# Patient Record
Sex: MALE | Race: WHITE | NOT HISPANIC OR LATINO | Employment: OTHER | URBAN - METROPOLITAN AREA
[De-identification: names, ages, dates, MRNs, and addresses within clinical notes are randomized per-mention and may not be internally consistent; named-entity substitution may affect disease eponyms.]

---

## 2023-07-19 ENCOUNTER — HOSPITAL ENCOUNTER (OUTPATIENT)
Dept: DATA CONVERSION | Facility: HOSPITAL | Age: 78
End: 2023-07-19
Attending: STUDENT IN AN ORGANIZED HEALTH CARE EDUCATION/TRAINING PROGRAM | Admitting: STUDENT IN AN ORGANIZED HEALTH CARE EDUCATION/TRAINING PROGRAM
Payer: MEDICARE

## 2023-07-19 DIAGNOSIS — C61 MALIGNANT NEOPLASM OF PROSTATE (MULTI): ICD-10-CM

## 2023-07-19 DIAGNOSIS — N52.9 MALE ERECTILE DYSFUNCTION, UNSPECIFIED: ICD-10-CM

## 2023-07-19 DIAGNOSIS — N32.9 BLADDER DISORDER, UNSPECIFIED: ICD-10-CM

## 2023-07-19 DIAGNOSIS — C67.9 MALIGNANT NEOPLASM OF BLADDER, UNSPECIFIED (MULTI): ICD-10-CM

## 2023-07-19 DIAGNOSIS — R31.29 OTHER MICROSCOPIC HEMATURIA: ICD-10-CM

## 2023-07-19 DIAGNOSIS — F17.210 NICOTINE DEPENDENCE, CIGARETTES, UNCOMPLICATED: ICD-10-CM

## 2023-07-19 DIAGNOSIS — N30.40 IRRADIATION CYSTITIS WITHOUT HEMATURIA: ICD-10-CM

## 2023-07-19 DIAGNOSIS — R33.9 RETENTION OF URINE, UNSPECIFIED: ICD-10-CM

## 2023-07-19 DIAGNOSIS — N32.89 OTHER SPECIFIED DISORDERS OF BLADDER: ICD-10-CM

## 2023-07-19 DIAGNOSIS — Z80.42 FAMILY HISTORY OF MALIGNANT NEOPLASM OF PROSTATE: ICD-10-CM

## 2023-07-19 DIAGNOSIS — N40.1 BENIGN PROSTATIC HYPERPLASIA WITH LOWER URINARY TRACT SYMPTOMS: ICD-10-CM

## 2023-07-28 LAB
COMPLETE PATHOLOGY REPORT: NORMAL
CONVERTED CLINICAL DIAGNOSIS-HISTORY: NORMAL
CONVERTED FINAL DIAGNOSIS: NORMAL
CONVERTED FINAL REPORT PDF LINK TO COPY AND PASTE: NORMAL
CONVERTED GROSS DESCRIPTION: NORMAL

## 2023-08-03 LAB — URINE CULTURE: NO GROWTH

## 2023-09-08 PROBLEM — K14.6 TONGUE PAIN: Status: ACTIVE | Noted: 2023-09-08

## 2023-09-08 PROBLEM — K11.5 SIALOLITHIASIS: Status: ACTIVE | Noted: 2023-09-08

## 2023-09-08 PROBLEM — Z72.0 TOBACCO ABUSE: Status: ACTIVE | Noted: 2023-09-08

## 2023-09-08 PROBLEM — N40.1 ENLARGED PROSTATE WITH LOWER URINARY TRACT SYMPTOMS (LUTS): Status: ACTIVE | Noted: 2023-09-08

## 2023-09-08 PROBLEM — K11.5 SALIVARY STONES: Status: ACTIVE | Noted: 2023-09-08

## 2023-09-08 PROBLEM — R33.9 INCOMPLETE EMPTYING OF BLADDER: Status: ACTIVE | Noted: 2023-09-08

## 2023-09-08 PROBLEM — N40.0 BENIGN PROSTATIC HYPERPLASIA: Status: ACTIVE | Noted: 2023-09-08

## 2023-09-08 PROBLEM — K14.6 GLOSSODYNIA: Status: ACTIVE | Noted: 2023-09-08

## 2023-09-08 PROBLEM — R31.29 MICROSCOPIC HEMATURIA: Status: ACTIVE | Noted: 2023-09-08

## 2023-09-08 PROBLEM — C67.9 MALIGNANT NEOPLASM OF BLADDER (MULTI): Status: ACTIVE | Noted: 2023-09-08

## 2023-09-08 PROBLEM — C61 ADENOCARCINOMA OF PROSTATE (MULTI): Status: ACTIVE | Noted: 2023-09-08

## 2023-09-08 PROBLEM — N52.9 MALE ERECTILE DISORDER OF ORGANIC ORIGIN: Status: ACTIVE | Noted: 2023-09-08

## 2023-09-08 PROBLEM — N30.40 RADIATION CYSTITIS: Status: ACTIVE | Noted: 2023-09-08

## 2023-09-08 PROBLEM — R33.9 URINARY RETENTION: Status: ACTIVE | Noted: 2023-09-08

## 2023-09-08 PROBLEM — K11.20 SIALOADENITIS OF SUBMANDIBULAR GLAND: Status: ACTIVE | Noted: 2023-09-08

## 2023-09-08 RX ORDER — CHOLECALCIFEROL (VITAMIN D3) 25 MCG
1 TABLET,CHEWABLE ORAL DAILY
COMMUNITY
Start: 2021-10-05 | End: 2023-10-12

## 2023-09-08 RX ORDER — PREDNISOLONE ACETATE 10 MG/ML
1 SUSPENSION/ DROPS OPHTHALMIC 4 TIMES DAILY
COMMUNITY
Start: 2023-06-01 | End: 2023-10-12 | Stop reason: ALTCHOICE

## 2023-09-08 RX ORDER — CEPHALEXIN 500 MG/1
500 TABLET ORAL 2 TIMES DAILY
COMMUNITY
Start: 2023-01-11 | End: 2023-10-12 | Stop reason: ALTCHOICE

## 2023-09-08 RX ORDER — TADALAFIL 5 MG/1
5 TABLET ORAL DAILY
COMMUNITY
Start: 2017-11-21 | End: 2023-10-12

## 2023-09-08 RX ORDER — METHENAMINE, SODIUM PHOSPHATE, MONOBASIC, MONOHYDRATE, PHENYL SALICYLATE, METHYLENE BLUE, AND HYOSCYAMINE SULFATE 118; 40.8; 36; 10; .12 MG/1; MG/1; MG/1; MG/1; MG/1
CAPSULE ORAL
COMMUNITY
End: 2023-10-12 | Stop reason: ALTCHOICE

## 2023-09-08 RX ORDER — DICLOFENAC SODIUM 1 MG/ML
1 SOLUTION/ DROPS OPHTHALMIC 2 TIMES DAILY
COMMUNITY
Start: 2023-06-01 | End: 2023-10-12 | Stop reason: ALTCHOICE

## 2023-09-08 RX ORDER — FLUTICASONE PROPIONATE 50 MCG
1-2 SPRAY, SUSPENSION (ML) NASAL DAILY PRN
COMMUNITY
Start: 2015-11-11 | End: 2023-10-12 | Stop reason: ALTCHOICE

## 2023-09-08 RX ORDER — DILTIAZEM HYDROCHLORIDE 120 MG/1
120 CAPSULE, EXTENDED RELEASE ORAL
COMMUNITY
End: 2024-03-25 | Stop reason: ALTCHOICE

## 2023-09-08 RX ORDER — PNV NO.95/FERROUS FUM/FOLIC AC 28MG-0.8MG
200 TABLET ORAL DAILY
COMMUNITY
End: 2023-10-12

## 2023-09-08 RX ORDER — NAPROXEN SODIUM 220 MG/1
81 TABLET, FILM COATED ORAL DAILY
COMMUNITY

## 2023-09-08 RX ORDER — AMOXICILLIN AND CLAVULANATE POTASSIUM 875; 125 MG/1; MG/1
875 TABLET, FILM COATED ORAL 2 TIMES DAILY
COMMUNITY
Start: 2022-11-08 | End: 2023-10-12 | Stop reason: ALTCHOICE

## 2023-09-08 RX ORDER — ESOMEPRAZOLE MAGNESIUM 40 MG/1
CAPSULE, DELAYED RELEASE ORAL DAILY
COMMUNITY
Start: 2011-08-16 | End: 2023-10-12

## 2023-09-08 RX ORDER — DILTIAZEM HYDROCHLORIDE 240 MG/1
CAPSULE, COATED, EXTENDED RELEASE ORAL DAILY
COMMUNITY
Start: 2023-03-29 | End: 2024-03-25 | Stop reason: SDUPTHER

## 2023-09-08 RX ORDER — GENTAMICIN SULFATE 3 MG/ML
SOLUTION/ DROPS OPHTHALMIC 4 TIMES DAILY
COMMUNITY
Start: 2019-12-13 | End: 2023-10-12 | Stop reason: ALTCHOICE

## 2023-09-08 RX ORDER — OXYBUTYNIN CHLORIDE 5 MG/1
5 TABLET, EXTENDED RELEASE ORAL 2 TIMES DAILY PRN
COMMUNITY
Start: 2023-01-11 | End: 2023-10-12 | Stop reason: SDUPTHER

## 2023-09-08 RX ORDER — TAMSULOSIN HYDROCHLORIDE 0.4 MG/1
0.4 CAPSULE ORAL DAILY
COMMUNITY
Start: 2012-06-26 | End: 2024-03-25 | Stop reason: ALTCHOICE

## 2023-09-08 RX ORDER — POLYMYXIN B SULFATE AND TRIMETHOPRIM 1; 10000 MG/ML; [USP'U]/ML
1 SOLUTION OPHTHALMIC 4 TIMES DAILY
COMMUNITY
Start: 2023-06-01 | End: 2023-10-12 | Stop reason: ALTCHOICE

## 2023-09-08 RX ORDER — PHENAZOPYRIDINE HYDROCHLORIDE 100 MG/1
100 TABLET, FILM COATED ORAL 3 TIMES DAILY
COMMUNITY
Start: 2023-01-11 | End: 2023-10-12 | Stop reason: ALTCHOICE

## 2023-09-08 RX ORDER — TIZANIDINE HYDROCHLORIDE 4 MG/1
4 CAPSULE, GELATIN COATED ORAL
COMMUNITY
End: 2023-10-12 | Stop reason: ALTCHOICE

## 2023-09-08 RX ORDER — ACETAMINOPHEN 650 MG/1
1300 TABLET, FILM COATED, EXTENDED RELEASE ORAL 2 TIMES DAILY PRN
COMMUNITY
Start: 2023-01-11

## 2023-09-08 RX ORDER — TRAMADOL HYDROCHLORIDE 5 MG/ML
SOLUTION ORAL
COMMUNITY
End: 2023-10-12 | Stop reason: ALTCHOICE

## 2023-09-08 RX ORDER — TRAMADOL HYDROCHLORIDE 50 MG/1
50 TABLET ORAL EVERY 6 HOURS
COMMUNITY
End: 2023-10-12 | Stop reason: ALTCHOICE

## 2023-09-13 LAB
ANION GAP IN SER/PLAS: 12 MMOL/L (ref 10–20)
CALCIUM (MG/DL) IN SER/PLAS: 9.1 MG/DL (ref 8.6–10.3)
CARBON DIOXIDE, TOTAL (MMOL/L) IN SER/PLAS: 23 MMOL/L (ref 21–32)
CHLORIDE (MMOL/L) IN SER/PLAS: 104 MMOL/L (ref 98–107)
CREATININE (MG/DL) IN SER/PLAS: 1.02 MG/DL (ref 0.5–1.3)
GFR MALE: 75 ML/MIN/1.73M2
GLUCOSE (MG/DL) IN SER/PLAS: 95 MG/DL (ref 74–99)
POTASSIUM (MMOL/L) IN SER/PLAS: 4.8 MMOL/L (ref 3.5–5.3)
PROSTATE SPECIFIC AG (NG/ML) IN SER/PLAS: 0.25 NG/ML (ref 0–4)
SODIUM (MMOL/L) IN SER/PLAS: 134 MMOL/L (ref 136–145)
UREA NITROGEN (MG/DL) IN SER/PLAS: 15 MG/DL (ref 6–23)

## 2023-09-30 NOTE — H&P
History of Present Illness:   HPI:    77 year old very pleasant gentleman presents s/p TURBT and mitomycin intravesical installation on January 11, 2023. He presents today  for cystoscopy to access for tumor reoccurance. pathology revealed NONINVASIVE PAPILLARY UROTHELIAL CARCINOMA, LOW GRADE. -- DETRUSOR MUSCLE IS PRESENT WITH NO SIGNIFICANT PATHOLOGIC ABNORMALITY. Stent was removed on 02/1/2023. LUTs are chronic and mild.  Denies frequency and urgency. Denies dysuria and hematuria. Nocturia x1. Caffeine does worsen LUTs. Flomax BID for LUTs. some side effects with cant make it to the bathroom in time. US done (02/22/23)         Review of Systems  All systems were reviewed. Anything negative was noted in the HPI.      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)   · Malignant neoplasm of bladder (188.9) (C67.9)   · Microscopic hematuria (599.72) (R31.29)   · Radiation cystitis (595.82) (N30.40)   · Salivary stones (527.5) (K11.5)   · Sialoadenitis of submandibular gland (527.2) (K11.20)   · Tobacco abuse (305.1) (Z72.0)   · Tongue pain (529.6) (K14.6)   · Urinary retention (788.20) (R33.9)    Past Medical History  Problems    · History of Arthritis (V13.4)   · History of Bladder Cancer (V10.51)   · History of Prostate Cancer (V10.46)    Surgical History  Problems    · History of Cataract surgery   · History of Cystoscopy With Resection Of Tumor   · History of General Surgery   · Resolved Date: 01 Jun 2012   · cyberknife radiosurgery   · History of Hernia Repair   · History of Hernia Repair   · History of Sialodochoplasty   · 2/28/17  Left sialodochoplasty with extraction of left whartons duct stone / Dr Margarito Sousa   · History of Tonsillectomy Over Age 13    Family History  Mother    ·  Family history of Breast Cancer (V16.3)   · Family history of Stroke Syndrome (V17.1)  Brother    · Family history of Prostate Cancer (V16.42)   · Family history of Prostate Cancer (V16.42)    Social History  Problems    · Being A Social Drinker   · Current every day smoker (305.1) (F17.200)   · Lives with daughter   ·  (V61.07) (Z63.4)    Allergies  Medication    · Cipro TABS  Recorded By: Audelia Camilo; 5/23/2013 2:21:10 PM   · Tramadol  Recorded By: Mk Clancy; 10/8/2019 1:18:50 PM   · Uroxatral TBCR  Recorded By: Anjali Yusuf; 6/11/2013 2:22:42 PM   · Zithromax CAPS  Recorded By: Audelia Camilo; 5/23/2013 2:21:10 PM    Current Meds    Medication Name Instruction   B-12 1000 MCG Oral Capsule TAKE 1 CAPSULE Daily   Baby Aspirin 81 MG CHEW    Dilacor  MG CP24    Fluticasone Propionate 50 MCG/ACT Nasal Suspension USE 1 SPRAY IN EACH NOSTRIL ONCE DAILY.   One Daily Mens TABS TAKE 1 TABLET DAILY.   Tadalafil 5 MG Oral Tablet Take 1 tablet daily   Tamsulosin HCl - 0.4 MG Oral Capsule TAKE 1 CAPSULE AT BEDTIME   traMADol HCl - 5 MG/ML Oral Solution      Vitals  Vital Signs    Recorded: [id number] 02:17PM   Heart Rate 91   Systolic 149   Diastolic 91   Height 5 ft 6 in   Weight 152 lb    BMI Calculated 24.53 kg/m2   BSA Calculated 1.78     Physical Exam  General: Well developed, well nourished, alert and cooperative, appears in no acute distress  Eyes: Non-injected conjunctiva, sclera clear, no proptosis  Cardiac: Extremities are warm and well perfused. No edema, cyanosis or pallor  Lungs: Breathing is easy, non-labored. Speaking in clear and complete sentences. Normal diaphragmatic movement  MSK: Ambulatory with steady gait, unassisted  Neuro: Alert and oriented to person, place, and time  Psych: Demonstrates good judgment and reason, without hallucinations, abnormal affect or abnormal behaviors  Skin: No obvious lesions, no rashes    No CVA tenderness bilaterally   No suprapubic pain or  discomfort            Comorbidities:   Comorbidites:  ·  Comorbid Conditions hypertension            Allergies:  ·  ciprofloxacin : Anaphylaxis  ·  Uroxatral : Other  ·  Zithromax : Other  ·  tramadol : Itching    Medications Prior to Admission:   Admission Medication Reconciliation has not been completed for this patient.    Objective:     Objective Information:        Pain reported at 7/19 10:39: 0 = None    Assessment and Plan:   Assessment:    NONINVASIVE PAPILLARY UROTHELIAL CARCINOMA, LOW GRADE, reoccurrence of bladder tumor on cysto today, prostate justin score 6 (3+3)     77 year old very pleasant gentleman presents s/p TURBT and mitomycin intravesical installation on January 11, 2023. Cystoscopy today revealed reoccurrence of bladder tumor, we discussed the need for another TURBT and discussed it in detail including risks,  benefits, adverse events, and potential complications. Patient verbalized understanding and wishes to proceed.     Plan   Tamsulosin 0.4 mg one time nightly   Schedule patient for TURBT           Impression 1: Bladder tumor   Plan for Impression 1: TURBT       Electronic Signatures:  Tanvir Marin)  (Signed 19-Jul-2023 10:57)   Authored: History of Present Illness, Comorbidities,  Allergies, Medications Prior to Admission, Objective, Assessment and Plan, Note Completion      Last Updated: 19-Jul-2023 10:57 by Tanvir Marin)

## 2023-10-03 ENCOUNTER — LAB (OUTPATIENT)
Dept: LAB | Facility: LAB | Age: 78
End: 2023-10-03
Payer: MEDICARE

## 2023-10-03 DIAGNOSIS — Z00.01 ENCOUNTER FOR GENERAL ADULT MEDICAL EXAMINATION WITH ABNORMAL FINDINGS: Primary | ICD-10-CM

## 2023-10-03 DIAGNOSIS — Z00.01 ENCOUNTER FOR GENERAL ADULT MEDICAL EXAMINATION WITH ABNORMAL FINDINGS: ICD-10-CM

## 2023-10-03 LAB
ALBUMIN SERPL BCP-MCNC: 4.2 G/DL (ref 3.4–5)
ALP SERPL-CCNC: 90 U/L (ref 33–136)
ALT SERPL W P-5'-P-CCNC: 7 U/L (ref 10–52)
ANION GAP SERPL CALC-SCNC: 11 MMOL/L (ref 10–20)
AST SERPL W P-5'-P-CCNC: 14 U/L (ref 9–39)
BILIRUB SERPL-MCNC: 0.5 MG/DL (ref 0–1.2)
BUN SERPL-MCNC: 18 MG/DL (ref 6–23)
CALCIUM SERPL-MCNC: 9.1 MG/DL (ref 8.6–10.3)
CHLORIDE SERPL-SCNC: 105 MMOL/L (ref 98–107)
CHOLEST SERPL-MCNC: 220 MG/DL (ref 0–199)
CHOLESTEROL/HDL RATIO: 4.2
CO2 SERPL-SCNC: 24 MMOL/L (ref 21–32)
CREAT SERPL-MCNC: 1.04 MG/DL (ref 0.5–1.3)
ERYTHROCYTE [DISTWIDTH] IN BLOOD BY AUTOMATED COUNT: 13.1 % (ref 11.5–14.5)
GFR SERPL CREATININE-BSD FRML MDRD: 74 ML/MIN/1.73M*2
GLUCOSE SERPL-MCNC: 83 MG/DL (ref 74–99)
HCT VFR BLD AUTO: 47.4 % (ref 41–52)
HDLC SERPL-MCNC: 52 MG/DL
HGB BLD-MCNC: 16 G/DL (ref 13.5–17.5)
LDLC SERPL CALC-MCNC: 136 MG/DL (ref 140–190)
MCH RBC QN AUTO: 33.8 PG (ref 26–34)
MCHC RBC AUTO-ENTMCNC: 33.8 G/DL (ref 32–36)
MCV RBC AUTO: 100 FL (ref 80–100)
NON HDL CHOLESTEROL: 168 MG/DL (ref 0–149)
NRBC BLD-RTO: 0 /100 WBCS (ref 0–0)
PLATELET # BLD AUTO: 280 X10*3/UL (ref 150–450)
PMV BLD AUTO: 9 FL (ref 7.5–11.5)
POTASSIUM SERPL-SCNC: 4.6 MMOL/L (ref 3.5–5.3)
PROT SERPL-MCNC: 7 G/DL (ref 6.4–8.2)
RBC # BLD AUTO: 4.73 X10*6/UL (ref 4.5–5.9)
SODIUM SERPL-SCNC: 135 MMOL/L (ref 136–145)
TRIGL SERPL-MCNC: 162 MG/DL (ref 0–149)
TSH SERPL-ACNC: 1.39 MIU/L (ref 0.44–3.98)
VLDL: 32 MG/DL (ref 0–40)
WBC # BLD AUTO: 10.1 X10*3/UL (ref 4.4–11.3)

## 2023-10-03 PROCEDURE — 36415 COLL VENOUS BLD VENIPUNCTURE: CPT

## 2023-10-12 ENCOUNTER — APPOINTMENT (OUTPATIENT)
Dept: UROLOGY | Facility: HOSPITAL | Age: 78
End: 2023-10-12
Payer: MEDICARE

## 2023-10-12 ENCOUNTER — OFFICE VISIT (OUTPATIENT)
Dept: UROLOGY | Facility: CLINIC | Age: 78
End: 2023-10-12
Payer: MEDICARE

## 2023-10-12 DIAGNOSIS — R35.0 URINARY FREQUENCY: Primary | ICD-10-CM

## 2023-10-12 DIAGNOSIS — N30.40 RADIATION CYSTITIS: ICD-10-CM

## 2023-10-12 DIAGNOSIS — R35.0 BENIGN PROSTATIC HYPERPLASIA WITH URINARY FREQUENCY: ICD-10-CM

## 2023-10-12 DIAGNOSIS — N40.1 BENIGN PROSTATIC HYPERPLASIA WITH URINARY FREQUENCY: ICD-10-CM

## 2023-10-12 LAB
POC APPEARANCE, URINE: CLEAR
POC BILIRUBIN, URINE: NEGATIVE
POC BLOOD, URINE: ABNORMAL
POC COLOR, URINE: YELLOW
POC GLUCOSE, URINE: NEGATIVE MG/DL
POC KETONES, URINE: ABNORMAL MG/DL
POC LEUKOCYTES, URINE: ABNORMAL
POC NITRITE,URINE: NEGATIVE
POC PH, URINE: 6 PH
POC PROTEIN, URINE: ABNORMAL MG/DL
POC SPECIFIC GRAVITY, URINE: 1.02
POC UROBILINOGEN, URINE: 0.2 EU/DL

## 2023-10-12 PROCEDURE — 99213 OFFICE O/P EST LOW 20 MIN: CPT | Performed by: NURSE PRACTITIONER

## 2023-10-12 PROCEDURE — 81003 URINALYSIS AUTO W/O SCOPE: CPT | Performed by: NURSE PRACTITIONER

## 2023-10-12 PROCEDURE — 1159F MED LIST DOCD IN RCRD: CPT | Performed by: NURSE PRACTITIONER

## 2023-10-12 PROCEDURE — 1036F TOBACCO NON-USER: CPT | Performed by: NURSE PRACTITIONER

## 2023-10-12 RX ORDER — OXYBUTYNIN CHLORIDE 5 MG/1
5 TABLET, EXTENDED RELEASE ORAL DAILY
Qty: 30 TABLET | Refills: 0 | Status: SHIPPED | OUTPATIENT
Start: 2023-10-12 | End: 2023-11-11

## 2023-10-12 NOTE — PROGRESS NOTES
UROLOGIC FOLLOW-UP VISIT     PROBLEM LIST:  1. Urinary frequency  POCT UA Automated manually resulted      2. Radiation cystitis  Urinalysis Microscopic Only    Urine culture      3. Benign prostatic hyperplasia with urinary frequency  oxybutynin XL (Ditropan-XL) 5 mg 24 hr tablet           HISTORY OF PRESENT ILLNESS:   Cedric Mohan is a 78 y.o. with bladder cancer, BPH with incomplete emptying, and microscopic hematuria who follows with Dr. Jorge Luis Marlow; last seen 8/16/23.    INTERVAL HISTORY:  Returns for FUV  Reports frequency, up to 15 x day  Also notes urgency with occasional leakage  No hematuria or dysuria  No fever or chills    PAST MEDICAL HISTORY:  Past Medical History:   Diagnosis Date    Other conditions influencing health status     Arthritis    Other conditions influencing health status     Bladder Cancer    Other conditions influencing health status     Prostate Cancer       PAST SURGICAL HISTORY:  Past Surgical History:   Procedure Laterality Date    HERNIA REPAIR  07/05/2013    Hernia Repair    HERNIA REPAIR  06/11/2013    Hernia Repair    OTHER SURGICAL HISTORY  05/23/2013    General Surgery    OTHER SURGICAL HISTORY  03/17/2017    Sialodochoplasty    OTHER SURGICAL HISTORY  06/11/2013    Tonsillectomy Over Age 13    OTHER SURGICAL HISTORY  06/11/2013    Cystoscopy With Resection Of Tumor        ALLERGIES:   Allergies   Allergen Reactions    Ciprofloxacin Anaphylaxis    Alfuzosin Other     Heart Pa;pitations    Azithromycin Other     Heart Papitations    Tramadol Unknown        MEDICATIONS:   Current Outpatient Medications on File Prior to Visit   Medication Sig Dispense Refill    amoxicillin-pot clavulanate (Augmentin) 875-125 mg tablet Take 1 tablet (875 mg) by mouth 2 times a day.      Arthritis Pain Relief, acetam, 650 mg ER tablet Take 2 tablets (1,300 mg) by mouth 2 times a day as needed.      aspirin 81 mg chewable tablet Chew 1 tablet (81 mg).      cephalexin (Keftab) 500 mg tablet  Take 1 tablet (500 mg) by mouth 2 times a day.      cyanocobalamin (Vitamin B-12) 100 mcg tablet Take 2 tablets (200 mcg) by mouth once daily.      cyanocobalamin (Vitamin B-12) 50 mcg tablet Take 1 tablet (50 mcg) by mouth once daily.      cyanocobalamin, vitamin B-12, 1,000 mcg capsule Take 1 capsule by mouth once daily.      diclofenac (Voltaren) 0.1 % ophthalmic solution Administer 1 drop into the left eye 2 times a day.      dilTIAZem CD (Cardizem CD) 240 mg 24 hr capsule Take by mouth once daily.      dilTIAZem XR (Dilacor XR) 120 mg 24 hr capsule Take 1 capsule (120 mg) by mouth.      fluticasone (Flonase) 50 mcg/actuation nasal spray Administer 1-2 sprays into each nostril once daily as needed.      gentamicin (Garamycin) 0.3 % ophthalmic solution Administer into affected eye(s) 4 times a day.      methen-m.blue-s.phos-phsal-hyo (UribeL) 118-10-40.8-36 mg capsule       multivitamin with minerals (Men's One Daily) tablet Take 1 tablet by mouth once daily.      NexIUM 40 mg DR capsule Take by mouth once daily.      oxybutynin XL (Ditropan-XL) 5 mg 24 hr tablet Take 1 tablet (5 mg) by mouth 2 times a day as needed.      phenazopyridine (Pyridium) 100 mg tablet Take 1 tablet (100 mg) by mouth 3 times a day.      polymyxin B sulf-trimethoprim (Polytrim) ophthalmic solution Administer 1 drop into the left eye 4 times a day.      prednisoLONE acetate (Pred-Forte) 1 % ophthalmic suspension Administer 1 drop into the left eye 4 times a day.      tadalafil (Cialis) 5 mg tablet Take 1 tablet (5 mg) by mouth once daily.      tamsulosin (Flomax) 0.4 mg 24 hr capsule Take 1 capsule (0.4 mg) by mouth once daily.      tiZANidine (Zanaflex) 4 mg capsule Take 1 capsule (4 mg) by mouth. 2-3x daily prn      traMADol (Qdolo) 5 mg/mL solution       traMADol (Ultram) 50 mg tablet Take 1 tablet (50 mg) by mouth every 6 hours.       No current facility-administered medications on file prior to visit.        SOCIAL HISTORY:  Patient      Social History     Socioeconomic History    Marital status:      Spouse name: Not on file    Number of children: Not on file    Years of education: Not on file    Highest education level: Not on file   Occupational History    Not on file   Tobacco Use    Smoking status: Not on file    Smokeless tobacco: Not on file   Substance and Sexual Activity    Alcohol use: Not on file    Drug use: Not on file    Sexual activity: Not on file   Other Topics Concern    Not on file   Social History Narrative    Not on file     Social Determinants of Health     Financial Resource Strain: Not on file   Food Insecurity: Not on file   Transportation Needs: Not on file   Physical Activity: Not on file   Stress: Not on file   Social Connections: Not on file   Intimate Partner Violence: Not on file   Housing Stability: Not on file       FAMILY HISTORY:  Family History   Problem Relation Name Age of Onset    Breast cancer Mother      Other (stroke syndrome) Mother      Prostate cancer Brother         REVIEW OF SYSTEMS:  All systems reviewed, pertinent negatives as noted in HPI.     PHYSICAL EXAM:  There were no vitals taken for this visit.  Constitutional: Well-developed and well-nourished. No distress.    Head: Normocephalic and atraumatic.    Neck: Normal range of motion.    Pulmonary/Chest: Effort normal. No respiratory distress.   Abdominal: Nondistended  : See below.  Integumentary: No rash or lesions.  Musculoskeletal: Normal range of motion.    Neurological: Alert and oriented to person, place, and time.  Psychiatric: Normal mood and affect. Thought content normal.      LABORATORY REVIEW:   Lab Results   Component Value Date    BUN 18 10/03/2023    CREATININE 1.04 10/03/2023    EGFR 74 10/03/2023     (L) 10/03/2023    K 4.6 10/03/2023     10/03/2023    CO2 24 10/03/2023    CALCIUM 9.1 10/03/2023      Lab Results   Component Value Date    WBC 10.1 10/03/2023    RBC 4.73 10/03/2023    HGB 16.0 10/03/2023     HCT 47.4 10/03/2023     10/03/2023    MCH 33.8 10/03/2023    MCHC 33.8 10/03/2023    RDW 13.1 10/03/2023     10/03/2023    MPV 9.0 10/03/2023        Lab Results   Component Value Date    PSA 0.25 09/13/2023    PSA 0.22 09/21/2022    PSA 0.25 04/14/2022    PSA 0.16 09/23/2021    PSA 0.26 04/09/2021    PSA 0.20 09/22/2020    PSA 0.26 04/20/2020    PSA 0.33 09/16/2019    PSA 0.30 04/03/2019    PSA 0.29 09/24/2018    PSA 0.34 04/06/2018    PSA 0.31 10/02/2017        Urine dipstick shows positive for WBC's, positive for RBC's, and positive for ketones.       Assessment:      1. Urinary frequency  POCT UA Automated manually resulted      2. Radiation cystitis  Urinalysis Microscopic Only    Urine culture      3. Benign prostatic hyperplasia with urinary frequency  oxybutynin XL (Ditropan-XL) 5 mg 24 hr tablet          Cedric Mohan is a 78 y.o. with bladder cancer, BPH with incomplete emptying, and microscopic hematuria who follows with Dr. Jorge Luis Marlow. Bothersome frequency, worsening BPH vs OAB. Has not yet completed CT urogram as previously ordered.     Plan:   Urine for micro, culture to r/o infection  Trial of oxybutynin XL 5 mg po daily  RTC with Dr. Jorge Luis Marlow after CT urogram  Encouraged to contact us in the interim with any questions, concerns

## 2023-12-05 ENCOUNTER — HOSPITAL ENCOUNTER (OUTPATIENT)
Dept: RADIOLOGY | Facility: HOSPITAL | Age: 78
Discharge: HOME | End: 2023-12-05
Payer: MEDICARE

## 2023-12-05 DIAGNOSIS — C67.9 MALIGNANT NEOPLASM OF BLADDER, UNSPECIFIED (MULTI): ICD-10-CM

## 2023-12-05 DIAGNOSIS — R33.9 RETENTION OF URINE, UNSPECIFIED: ICD-10-CM

## 2023-12-05 PROCEDURE — 2550000001 HC RX 255 CONTRASTS: Performed by: STUDENT IN AN ORGANIZED HEALTH CARE EDUCATION/TRAINING PROGRAM

## 2023-12-05 PROCEDURE — 74178 CT ABD&PLV WO CNTR FLWD CNTR: CPT

## 2023-12-05 PROCEDURE — 74178 CT ABD&PLV WO CNTR FLWD CNTR: CPT | Performed by: RADIOLOGY

## 2023-12-05 PROCEDURE — 76377 3D RENDER W/INTRP POSTPROCES: CPT | Performed by: RADIOLOGY

## 2023-12-05 RX ADMIN — IOHEXOL 75 ML: 350 INJECTION, SOLUTION INTRAVENOUS at 10:13

## 2023-12-20 ENCOUNTER — OFFICE VISIT (OUTPATIENT)
Dept: UROLOGY | Facility: HOSPITAL | Age: 78
End: 2023-12-20
Payer: MEDICARE

## 2023-12-20 DIAGNOSIS — R33.9 URINARY RETENTION: Primary | ICD-10-CM

## 2023-12-20 PROCEDURE — 1126F AMNT PAIN NOTED NONE PRSNT: CPT | Performed by: STUDENT IN AN ORGANIZED HEALTH CARE EDUCATION/TRAINING PROGRAM

## 2023-12-20 PROCEDURE — 1159F MED LIST DOCD IN RCRD: CPT | Performed by: STUDENT IN AN ORGANIZED HEALTH CARE EDUCATION/TRAINING PROGRAM

## 2023-12-20 PROCEDURE — 99213 OFFICE O/P EST LOW 20 MIN: CPT | Performed by: STUDENT IN AN ORGANIZED HEALTH CARE EDUCATION/TRAINING PROGRAM

## 2023-12-20 PROCEDURE — 1036F TOBACCO NON-USER: CPT | Performed by: STUDENT IN AN ORGANIZED HEALTH CARE EDUCATION/TRAINING PROGRAM

## 2023-12-20 PROCEDURE — 1160F RVW MEDS BY RX/DR IN RCRD: CPT | Performed by: STUDENT IN AN ORGANIZED HEALTH CARE EDUCATION/TRAINING PROGRAM

## 2023-12-20 NOTE — PROGRESS NOTES
"Subjective   Patient ID: Cedric Mohan is a 78 y.o. male who presents for his 4-month follow-up visit. discomfort in the perineal region..  He reports a discomfort in the posterior perineal region. He describes its as a feeling of \"sitting on a catheter\". However, he denies hematuria, dysuria and perineal pain.     We diwscussed CTU today.  Pt denies any LUTs    Review of Systems   All other systems reviewed and are negative.        Past Medical History:   Diagnosis Date    Other conditions influencing health status     Arthritis    Other conditions influencing health status     Bladder Cancer    Other conditions influencing health status     Prostate Cancer       Past Surgical History:   Procedure Laterality Date    HERNIA REPAIR  07/05/2013    Hernia Repair    HERNIA REPAIR  06/11/2013    Hernia Repair    OTHER SURGICAL HISTORY  05/23/2013    General Surgery    OTHER SURGICAL HISTORY  03/17/2017    Sialodochoplasty    OTHER SURGICAL HISTORY  06/11/2013    Tonsillectomy Over Age 13    OTHER SURGICAL HISTORY  06/11/2013    Cystoscopy With Resection Of Tumor       Objective   Physical Exam    PSA done 09/13/2023: 0.25    CT Urography done 12/05/2023:  IMPRESSION:  1.  Interval resolution of previously visualized areas of soft tissue  filling defects of the right-greater-than-left posterior urinary  bladder wall without evidence of new abnormal filling defects along  the urinary bladder wall on delayed phase imaging. New coarse  calcifications within the posterior aspect of the urinary bladder  lumen are favored to be secondary to patient's history of  intravesical mitomycin treatment (likely treated disease).  Additionally, asymmetric urinary bladder wall thickening along the  right-greater-than-left posterior urinary bladder wall is also  favored to be secondary to posttreatment changes, although underlying  neoplasm can not be excluded. Attention on follow-up is recommended.  2. No evidence of filling defects " within the bilateral pelvocaliceal  system or bilateral ureters on delayed phase imaging within the  limitations of this motion degraded exam. No hydronephrosis or  nephrolithiasis bilaterally.  3. Long segment colonic wall thickening involving the cecum,  ascending colon and hepatic flexure with associated mucosal  hyperenhancement and no significant pericolonic fat stranding.  Findings are favored to reflect subacute or chronic colitis,  correlate with patient's symptoms.  4. Remaining chronic and incidental findings are unchanged as  described above.  Assessment/Plan   Problem List Items Addressed This Visit             ICD-10-CM    Urinary retention - Primary R33.9    Relevant Orders    Cystoscopy   Post-Radiation therapy, hx Urinary Retention, Atypical urethelium on TURBT on 7/19/23    We had a very long and extensive discussion with the patient regarding the pathophysiology, differential diagnosis, risk factor, management, natural history, incidence and diagnostic work-up of the condition.    Plan  Cysto in 3 months        Scribe Attestation  By signing my name below, Karen ROSENBERG, Scribe   attest that this documentation has been prepared under the direction and in the presence of Tanvir Marlow MD MPH.

## 2024-03-20 ENCOUNTER — PROCEDURE VISIT (OUTPATIENT)
Dept: UROLOGY | Facility: HOSPITAL | Age: 79
End: 2024-03-20
Payer: MEDICARE

## 2024-03-20 DIAGNOSIS — R33.9 URINARY RETENTION: Primary | ICD-10-CM

## 2024-03-20 LAB
POC APPEARANCE, URINE: CLEAR
POC BILIRUBIN, URINE: NEGATIVE
POC BLOOD, URINE: NEGATIVE
POC COLOR, URINE: YELLOW
POC GLUCOSE, URINE: NEGATIVE MG/DL
POC KETONES, URINE: ABNORMAL MG/DL
POC LEUKOCYTES, URINE: NEGATIVE
POC NITRITE,URINE: NEGATIVE
POC PH, URINE: 6.5 PH
POC PROTEIN, URINE: NEGATIVE MG/DL
POC SPECIFIC GRAVITY, URINE: 1.02
POC UROBILINOGEN, URINE: 0.2 EU/DL

## 2024-03-20 PROCEDURE — 52000 CYSTOURETHROSCOPY: CPT | Performed by: STUDENT IN AN ORGANIZED HEALTH CARE EDUCATION/TRAINING PROGRAM

## 2024-03-20 PROCEDURE — 81003 URINALYSIS AUTO W/O SCOPE: CPT | Mod: QW | Performed by: STUDENT IN AN ORGANIZED HEALTH CARE EDUCATION/TRAINING PROGRAM

## 2024-03-20 PROCEDURE — 99213 OFFICE O/P EST LOW 20 MIN: CPT | Performed by: STUDENT IN AN ORGANIZED HEALTH CARE EDUCATION/TRAINING PROGRAM

## 2024-03-20 NOTE — PROGRESS NOTES
Subjective   Patient ID: Cedric Mohan is a 79 y.o. male    HPI  79 y.o. male who presents for cystoscopic evaluation for bladder lesion Pt has a CT urography, conducted on 12/5/2023, revealed :     Interval resolution of previously visualized areas of soft tissue filling defects of the right-greater-than-left posterior urinary bladder wall without evidence of new abnormal filling defects along the urinary bladder wall on delayed phase imaging. New coarse calcifications within the posterior aspect of the urinary bladder lumen are favored to be secondary to patient's history of intravesical mitomycin treatment (likely treated disease).  Additionally, asymmetric urinary bladder wall thickening along the  right-greater-than-left posterior urinary bladder wall is also  favored to be secondary to posttreatment changes, although underlying  neoplasm can not be excluded. Attention on follow-up is recommended.       Review of Systems    All systems were reviewed. Anything negative was noted in the HPI.    Objective   Physical Exam    General: Well developed, well nourished, alert and cooperative, appears in no acute distress   Eyes: Non-injected conjunctiva, sclera clear, no proptosis   Cardiac: Extremities are warm and well perfused. No edema, cyanosis or pallor   Lungs: Breathing is easy, non-labored. Speaking in clear and complete sentences. Normal diaphragmatic movement   MSK: Ambulatory with steady gait, unassisted   Neuro: Alert and oriented to person, place, and time   Psych: Demonstrates good judgment and reason, without hallucinations, abnormal affect or abnormal behaviors   Skin: No obvious lesions, no rashes       No CVA tenderness bilaterally   No suprapubic pain or discomfort       Past Medical History:   Diagnosis Date    Other conditions influencing health status     Arthritis    Other conditions influencing health status     Bladder Cancer    Other conditions influencing health status     Prostate Cancer          Past Surgical History:   Procedure Laterality Date    HERNIA REPAIR  07/05/2013    Hernia Repair    HERNIA REPAIR  06/11/2013    Hernia Repair    OTHER SURGICAL HISTORY  05/23/2013    General Surgery    OTHER SURGICAL HISTORY  03/17/2017    Sialodochoplasty    OTHER SURGICAL HISTORY  06/11/2013    Tonsillectomy Over Age 13    OTHER SURGICAL HISTORY  06/11/2013    Cystoscopy With Resection Of Tumor       Procedure:    The patient was prepped using a Betadine solution. Lidocaine jelly was instilled into the urethra. The flexible cystoscope was sterilely inserted into the urethra and formal cystoscopy performed in a systematic fashion. For detailed findings of the procedure, please see Dr. Marlow’s remarks below  Scope A used, Cipro 500 mg p.o. given      Assessment/Plan   Cystoscopic evaluation for Bladder lesion on CT    79 y.o. male who presents for the above condition, We had a very long and extensive discussion with the patient regarding the pathophysiology, differential diagnosis, risk factor, management, natural history, incidence and diagnostic work-up of the condition.     Surgical intervention will be scheduled to remove scar tissue and potentially send tissue for pathological examination to rule out malignancy    Plan:  - TURBT Vs observation and fu in 4 months with another cysto  - Pt wants to follow up his care at [facility]           Scribe Attestation  By signing my name below, I, Corina Collins   attest that this documentation has been prepared under the direction and in the presence of Dr. Tanvir Marlow

## 2024-03-20 NOTE — PROGRESS NOTES
Patient ID: Cedric Mohan is a 79 y.o. male.    Cystoscopy    Date/Time: 3/20/2024 1:05 PM    Performed by: Tanvir Marlow MD MPH  Authorized by: Tanvir Marlow MD MPH    Procedure - Bladder Cystoscopy:     Procedure details: cystoscopy    PROCEDURE NOTE:    PREOPERATIVE DIAGNOSIS:  Bladder lesion-    POSTOPERATIVE DIAGNOSIS:  Same    OPERATION:  Flexible Cystourethroscopy      SURGEON:  Tanvir Marlow MD MPH    ANESTHESIA:  2%  lidocaine jelly    COMPLICATIONS:  None    EBL: Minimal      DISPOSITION:  The patient was discharged home after the procedure, per routine.    INDICATIONS: :  Mr. Mohan is a 79 y.o. patient with a history of bladder lesion who presents today for Cystoscopy.     The indications, risks and benefits of this procedure were discussed with the patient, consent was obtained prior to the procedure, and to the best of my judgement the patient seemed to understand and agree to the procedure.    PROCEDURE:  The patient  was brought into the procedure suite and informed consent was reviewed and confirmed. Vital signs were obtained prior to the procedure: There were no vitals taken for this visit..  The patient was escorted onto the stretcher, placed supine, prepped with betadine and draped in the usual standard surgical fashion.  Intraurethral 2% viscous lidocaine jelly was used for local analgesia.  A 16 Faroese flexible cystourethroscope was inserted into the urethra.   The penile urethra was normal.  The prostate urethra was normal.  Upon entering the bladder the entire bladder was surveyed in a 360 degree fashion.  Pt was found to have a large bladder lesion, does not look like TCC, but rather a scar tissue/ fibrenous lesion from his old TURBT. The left and right ureteral orifices were in normal orthotopic position effluxing clear yellow urine, bilaterally.   There was no evidence, foreign objects, stones or evidence of any mucosal changes. The cystoscope was then retroflexed.  The  bladder neck was then further examined without any evidence of lesions. The scope was then removed and in an antegrade fashion, the urethra and bladder were again resurveyed with no evidence of additional lesions.  The cystoscope was then fully removed.   The patient tolerated the procedure well.  Vitals were stable after the procedure.  The patient was able to void and was discharged home.  Verbal and written Post procedure instructions were reviewed with the patient.    IMPRESSION:  could be scar tissue from previous TURBT    PLAN:  TURBT Vs observation and fu in 4 months with another cysto

## 2024-03-25 ENCOUNTER — OFFICE VISIT (OUTPATIENT)
Dept: PRIMARY CARE | Facility: CLINIC | Age: 79
End: 2024-03-25
Payer: MEDICARE

## 2024-03-25 VITALS
WEIGHT: 152 LBS | SYSTOLIC BLOOD PRESSURE: 124 MMHG | HEIGHT: 66 IN | HEART RATE: 80 BPM | BODY MASS INDEX: 24.43 KG/M2 | DIASTOLIC BLOOD PRESSURE: 80 MMHG

## 2024-03-25 DIAGNOSIS — I10 HYPERTENSION, UNSPECIFIED TYPE: ICD-10-CM

## 2024-03-25 DIAGNOSIS — M50.90 CERVICAL DISC DISORDER: ICD-10-CM

## 2024-03-25 DIAGNOSIS — Z72.0 TOBACCO ABUSE: ICD-10-CM

## 2024-03-25 DIAGNOSIS — C67.9 MALIGNANT NEOPLASM OF URINARY BLADDER, UNSPECIFIED SITE (MULTI): Primary | ICD-10-CM

## 2024-03-25 PROCEDURE — 99213 OFFICE O/P EST LOW 20 MIN: CPT | Performed by: INTERNAL MEDICINE

## 2024-03-25 PROCEDURE — 1159F MED LIST DOCD IN RCRD: CPT | Performed by: INTERNAL MEDICINE

## 2024-03-25 PROCEDURE — 3079F DIAST BP 80-89 MM HG: CPT | Performed by: INTERNAL MEDICINE

## 2024-03-25 PROCEDURE — 4004F PT TOBACCO SCREEN RCVD TLK: CPT | Performed by: INTERNAL MEDICINE

## 2024-03-25 PROCEDURE — 3074F SYST BP LT 130 MM HG: CPT | Performed by: INTERNAL MEDICINE

## 2024-03-25 RX ORDER — ESOMEPRAZOLE MAGNESIUM 40 MG/1
40 CAPSULE, DELAYED RELEASE ORAL
Qty: 90 CAPSULE | Refills: 1 | Status: SHIPPED | OUTPATIENT
Start: 2024-03-25

## 2024-03-25 RX ORDER — ESOMEPRAZOLE MAGNESIUM 40 MG/1
40 CAPSULE, DELAYED RELEASE ORAL
COMMUNITY
End: 2024-03-25 | Stop reason: SDUPTHER

## 2024-03-25 RX ORDER — BISMUTH SUBSALICYLATE 262 MG
1 TABLET,CHEWABLE ORAL DAILY
COMMUNITY

## 2024-03-25 RX ORDER — DILTIAZEM HYDROCHLORIDE 240 MG/1
240 CAPSULE, COATED, EXTENDED RELEASE ORAL DAILY
Qty: 90 CAPSULE | Refills: 1 | Status: SHIPPED | OUTPATIENT
Start: 2024-03-25

## 2024-03-25 RX ORDER — FLUTICASONE PROPIONATE 50 MCG
1 SPRAY, SUSPENSION (ML) NASAL DAILY
Qty: 16 G | Refills: 3 | Status: SHIPPED | OUTPATIENT
Start: 2024-03-25

## 2024-03-25 RX ORDER — FLUTICASONE PROPIONATE 50 MCG
1 SPRAY, SUSPENSION (ML) NASAL DAILY
COMMUNITY
End: 2024-03-25 | Stop reason: SDUPTHER

## 2024-03-25 NOTE — PROGRESS NOTES
"Subjective   Patient ID: Cedric Mohan is a 79 y.o. male who presents for Follow-up (6 MONTH FOLLOW UP).    HPI BLADDER CANCER FOLLOWS WITH URO   BREATHING IS OK     Review of Systems    Objective   /80   Pulse 80   Ht 1.676 m (5' 6\")   Wt 68.9 kg (152 lb)   BMI 24.53 kg/m²     Physical Exam  NAD  CARD RRR  PULM COPD  ABD NEG   EXT  NL     Assessment/Plan   Diagnoses and all orders for this visit:  Malignant neoplasm of urinary bladder, unspecified site (CMS/HCC)  Comments:  FOLLOWS WITH URO  Orders:  -     dilTIAZem CD (Cardizem CD) 240 mg 24 hr capsule; Take 1 capsule (240 mg) by mouth once daily.  -     esomeprazole (NexIUM) 40 mg DR capsule; Take 1 capsule (40 mg) by mouth once daily in the morning. Take before meals. Do not open capsule.  -     fluticasone (Flonase) 50 mcg/actuation nasal spray; Administer 1 spray into each nostril once daily. Shake gently. Before first use, prime pump. After use, clean tip and replace cap.  Cervical disc disorder  Comments:  ONGOING RIGHT SIDE  Orders:  -     dilTIAZem CD (Cardizem CD) 240 mg 24 hr capsule; Take 1 capsule (240 mg) by mouth once daily.  -     esomeprazole (NexIUM) 40 mg DR capsule; Take 1 capsule (40 mg) by mouth once daily in the morning. Take before meals. Do not open capsule.  -     fluticasone (Flonase) 50 mcg/actuation nasal spray; Administer 1 spray into each nostril once daily. Shake gently. Before first use, prime pump. After use, clean tip and replace cap.  Tobacco abuse  Comments:  ONGOING  Orders:  -     dilTIAZem CD (Cardizem CD) 240 mg 24 hr capsule; Take 1 capsule (240 mg) by mouth once daily.  -     esomeprazole (NexIUM) 40 mg DR capsule; Take 1 capsule (40 mg) by mouth once daily in the morning. Take before meals. Do not open capsule.  -     fluticasone (Flonase) 50 mcg/actuation nasal spray; Administer 1 spray into each nostril once daily. Shake gently. Before first use, prime pump. After use, clean tip and replace " cap.  Hypertension, unspecified type  -     dilTIAZem CD (Cardizem CD) 240 mg 24 hr capsule; Take 1 capsule (240 mg) by mouth once daily.  -     esomeprazole (NexIUM) 40 mg DR capsule; Take 1 capsule (40 mg) by mouth once daily in the morning. Take before meals. Do not open capsule.  -     fluticasone (Flonase) 50 mcg/actuation nasal spray; Administer 1 spray into each nostril once daily. Shake gently. Before first use, prime pump. After use, clean tip and replace cap.  Other orders  -     Follow Up In Primary Care - Established; Future

## 2024-04-08 ENCOUNTER — APPOINTMENT (OUTPATIENT)
Dept: UROLOGY | Facility: CLINIC | Age: 79
End: 2024-04-08
Payer: MEDICARE

## 2024-04-12 ENCOUNTER — OFFICE VISIT (OUTPATIENT)
Dept: UROLOGY | Facility: CLINIC | Age: 79
End: 2024-04-12
Payer: MEDICARE

## 2024-04-12 DIAGNOSIS — C67.9 MALIGNANT NEOPLASM OF URINARY BLADDER, UNSPECIFIED SITE (MULTI): ICD-10-CM

## 2024-04-12 DIAGNOSIS — C61 ADENOCARCINOMA OF PROSTATE (MULTI): ICD-10-CM

## 2024-04-12 DIAGNOSIS — N40.1 BENIGN PROSTATIC HYPERPLASIA WITH URINARY FREQUENCY: Primary | ICD-10-CM

## 2024-04-12 DIAGNOSIS — R35.0 BENIGN PROSTATIC HYPERPLASIA WITH URINARY FREQUENCY: Primary | ICD-10-CM

## 2024-04-12 LAB
POC BILIRUBIN, URINE: NEGATIVE
POC BLOOD, URINE: NEGATIVE
POC GLUCOSE, URINE: NEGATIVE MG/DL
POC KETONES, URINE: NEGATIVE MG/DL
POC LEUKOCYTES, URINE: NEGATIVE
POC NITRITE,URINE: NEGATIVE
POC PH, URINE: 6.5 PH
POC PROTEIN, URINE: NEGATIVE MG/DL
POC SPECIFIC GRAVITY, URINE: 1.01
POC UROBILINOGEN, URINE: 0.2 EU/DL

## 2024-04-12 PROCEDURE — 99214 OFFICE O/P EST MOD 30 MIN: CPT | Performed by: UROLOGY

## 2024-04-12 PROCEDURE — 1159F MED LIST DOCD IN RCRD: CPT | Performed by: UROLOGY

## 2024-04-12 PROCEDURE — 81003 URINALYSIS AUTO W/O SCOPE: CPT | Performed by: UROLOGY

## 2024-04-12 RX ORDER — TAMSULOSIN HYDROCHLORIDE 0.4 MG/1
0.4 CAPSULE ORAL DAILY
Qty: 90 CAPSULE | Refills: 3 | Status: SHIPPED | OUTPATIENT
Start: 2024-04-12

## 2024-04-12 NOTE — PROGRESS NOTES
04/12/2024  History of bladder cancer, 1 month post surveillance cystoscopy.  Voiding okay on Flomax 0.4 mg    Patient has no nausea, no vomiting, no fever.    YUKO: Deferred    PSA: Normal to updated    We discussed bladder cancer, surveillance cystoscopy every 3 to 4 months  We discussed benign prostate hypertrophy continue Flomax 0.4 mg daily, may consider 0.8 mg as well  We discussed PSA screening  All the questions were answered, the patient expressed understanding and agreed to the plan.    Impression  Bladder cancer  BPH    Plan  Cystoscopy in 4-month  Continue Flomax 0.4 mg daily  May consider 0.8 mg daily      I spent 25 minutes with this patient. Greater than 50% of this time was spent in counseling and/or coordination of care    Chief Complaint   Patient presents with    Bladder Lesion     Patient here to establish with a urologist in [place]. He previously saw Dr. Marlow at [facility], but now wants to be seen here. Cystoscopy was performed 03/20/2024 per Dr. Marlow. Patient is here to discuss TURBT vs surveillance cystoscopy in 3 months. Patient has a hx of prostate cancer and bladder cancer.         Physical Exam     TODAYS LAB RESULTS:    PSA 09/13/2023  0.25    POC Glucose, Urine  NEGATIVE mg/dl NEGATIVE   POC Bilirubin, Urine  NEGATIVE NEGATIVE   POC Ketones, Urine  NEGATIVE mg/dl NEGATIVE   POC Specific Gravity, Urine  1.005 - 1.035 1.010   POC Blood, Urine  NEGATIVE NEGATIVE   POC PH, Urine  No Reference Range Established PH 6.5   POC Protein, Urine  NEGATIVE, 30 (1+) mg/dl NEGATIVE   POC Urobilinogen, Urine  0.2, 1.0 EU/DL 0.2   Poc Nitrite, Urine  NEGATIVE NEGATIVE   POC Leukocytes, Urine  NEGATIVE NEGATIVE     ASSESSMENT&PLAN:      IMPRESSIONS:       HPI  79 y.o. male who presents for cystoscopic evaluation for bladder lesion Pt has a CT urography, conducted on 12/5/2023, revealed :      Interval resolution of previously visualized areas of soft tissue filling defects of the right-greater-than-left  posterior urinary bladder wall without evidence of new abnormal filling defects along the urinary bladder wall on delayed phase imaging. New coarse calcifications within the posterior aspect of the urinary bladder lumen are favored to be secondary to patient's history of intravesical mitomycin treatment (likely treated disease).  Additionally, asymmetric urinary bladder wall thickening along the  right-greater-than-left posterior urinary bladder wall is also  favored to be secondary to posttreatment changes, although underlying  neoplasm can not be excluded. Attention on follow-up is recommended.        Review of Systems     All systems were reviewed. Anything negative was noted in the HPI.     Objective   Physical Exam     General: Well developed, well nourished, alert and cooperative, appears in no acute distress   Eyes: Non-injected conjunctiva, sclera clear, no proptosis   Cardiac: Extremities are warm and well perfused. No edema, cyanosis or pallor   Lungs: Breathing is easy, non-labored. Speaking in clear and complete sentences. Normal diaphragmatic movement   MSK: Ambulatory with steady gait, unassisted   Neuro: Alert and oriented to person, place, and time   Psych: Demonstrates good judgment and reason, without hallucinations, abnormal affect or abnormal behaviors   Skin: No obvious lesions, no rashes       No CVA tenderness bilaterally   No suprapubic pain or discomfort         Medical History        Past Medical History:   Diagnosis Date    Other conditions influencing health status       Arthritis    Other conditions influencing health status       Bladder Cancer    Other conditions influencing health status       Prostate Cancer               Surgical History         Past Surgical History:   Procedure Laterality Date    HERNIA REPAIR   07/05/2013     Hernia Repair    HERNIA REPAIR   06/11/2013     Hernia Repair    OTHER SURGICAL HISTORY   05/23/2013     General Surgery    OTHER SURGICAL HISTORY    03/17/2017     Sialodochoplasty    OTHER SURGICAL HISTORY   06/11/2013     Tonsillectomy Over Age 13    OTHER SURGICAL HISTORY   06/11/2013     Cystoscopy With Resection Of Tumor         3/20/24 Dr. Marlow  Procedure:     The patient was prepped using a Betadine solution. Lidocaine jelly was instilled into the urethra. The flexible cystoscope was sterilely inserted into the urethra and formal cystoscopy performed in a systematic fashion. For detailed findings of the procedure, please see Dr. Marlow’s remarks below  Scope A used, Cipro 500 mg p.o. given           Assessment/Plan   Cystoscopic evaluation for Bladder lesion on CT     79 y.o. male who presents for the above condition, We had a very long and extensive discussion with the patient regarding the pathophysiology, differential diagnosis, risk factor, management, natural history, incidence and diagnostic work-up of the condition.      Surgical intervention will be scheduled to remove scar tissue and potentially send tissue for pathological examination to rule out malignancy     Plan:  - TURBT Vs observation and fu in 4 months with another cysto  - Pt wants to follow up his care at [facility]           Surgery  7/19/2023 cystoscopy TURBT, mitomycin instillation dr Marlow  1/11/2023 TURBT and mitomycin intravesical instillation dr Marlow

## 2024-05-13 ENCOUNTER — HOSPITAL ENCOUNTER (INPATIENT)
Facility: HOSPITAL | Age: 79
LOS: 4 days | Discharge: HOME | DRG: 871 | End: 2024-05-17
Attending: STUDENT IN AN ORGANIZED HEALTH CARE EDUCATION/TRAINING PROGRAM | Admitting: INTERNAL MEDICINE
Payer: MEDICARE

## 2024-05-13 ENCOUNTER — APPOINTMENT (OUTPATIENT)
Dept: CARDIOLOGY | Facility: HOSPITAL | Age: 79
DRG: 871 | End: 2024-05-13
Payer: MEDICARE

## 2024-05-13 ENCOUNTER — APPOINTMENT (OUTPATIENT)
Dept: RADIOLOGY | Facility: HOSPITAL | Age: 79
DRG: 871 | End: 2024-05-13
Payer: MEDICARE

## 2024-05-13 DIAGNOSIS — A41.9 SEPSIS DUE TO PNEUMONIA (MULTI): Primary | ICD-10-CM

## 2024-05-13 DIAGNOSIS — R06.09 DYSPNEA ON EXERTION: ICD-10-CM

## 2024-05-13 DIAGNOSIS — J18.9 SEPSIS DUE TO PNEUMONIA (MULTI): Primary | ICD-10-CM

## 2024-05-13 DIAGNOSIS — I48.91 ATRIAL FIBRILLATION WITH RAPID VENTRICULAR RESPONSE (MULTI): ICD-10-CM

## 2024-05-13 PROBLEM — N40.1 ENLARGED PROSTATE WITH LOWER URINARY TRACT SYMPTOMS (LUTS): Chronic | Status: ACTIVE | Noted: 2023-09-08

## 2024-05-13 PROBLEM — Z72.0 TOBACCO ABUSE: Chronic | Status: ACTIVE | Noted: 2023-09-08

## 2024-05-13 PROBLEM — I10 ESSENTIAL HYPERTENSION: Chronic | Status: ACTIVE | Noted: 2024-05-13

## 2024-05-13 PROBLEM — C61 ADENOCARCINOMA OF PROSTATE (MULTI): Chronic | Status: ACTIVE | Noted: 2023-09-08

## 2024-05-13 PROBLEM — N30.00 ACUTE CYSTITIS WITHOUT HEMATURIA: Status: ACTIVE | Noted: 2024-05-13

## 2024-05-13 PROBLEM — J96.01 ACUTE HYPOXIC RESPIRATORY FAILURE (MULTI): Status: ACTIVE | Noted: 2024-05-13

## 2024-05-13 LAB
ALBUMIN SERPL BCP-MCNC: 3.4 G/DL (ref 3.4–5)
ALP SERPL-CCNC: 170 U/L (ref 33–136)
ALT SERPL W P-5'-P-CCNC: 30 U/L (ref 10–52)
ANION GAP SERPL CALC-SCNC: 17 MMOL/L (ref 10–20)
APPEARANCE UR: ABNORMAL
AST SERPL W P-5'-P-CCNC: 31 U/L (ref 9–39)
BASOPHILS # BLD MANUAL: 0.25 X10*3/UL (ref 0–0.1)
BASOPHILS NFR BLD MANUAL: 1 %
BILIRUB SERPL-MCNC: 1.1 MG/DL (ref 0–1.2)
BILIRUB UR STRIP.AUTO-MCNC: NEGATIVE MG/DL
BNP SERPL-MCNC: 25 PG/ML (ref 0–99)
BUN SERPL-MCNC: 15 MG/DL (ref 6–23)
CALCIUM SERPL-MCNC: 8.7 MG/DL (ref 8.6–10.3)
CARDIAC TROPONIN I PNL SERPL HS: 13 NG/L (ref 0–20)
CHLORIDE SERPL-SCNC: 99 MMOL/L (ref 98–107)
CO2 SERPL-SCNC: 20 MMOL/L (ref 21–32)
COLOR UR: ABNORMAL
CREAT SERPL-MCNC: 0.88 MG/DL (ref 0.5–1.3)
EGFRCR SERPLBLD CKD-EPI 2021: 88 ML/MIN/1.73M*2
EOSINOPHIL # BLD MANUAL: 0 X10*3/UL (ref 0–0.4)
EOSINOPHIL NFR BLD MANUAL: 0 %
ERYTHROCYTE [DISTWIDTH] IN BLOOD BY AUTOMATED COUNT: 12.9 % (ref 11.5–14.5)
GLUCOSE BLD MANUAL STRIP-MCNC: 102 MG/DL (ref 74–99)
GLUCOSE BLD MANUAL STRIP-MCNC: 163 MG/DL (ref 74–99)
GLUCOSE BLD MANUAL STRIP-MCNC: 92 MG/DL (ref 74–99)
GLUCOSE SERPL-MCNC: 125 MG/DL (ref 74–99)
GLUCOSE UR STRIP.AUTO-MCNC: NEGATIVE MG/DL
HCT VFR BLD AUTO: 42.2 % (ref 41–52)
HGB BLD-MCNC: 14.3 G/DL (ref 13.5–17.5)
HOLD SPECIMEN: NORMAL
IMM GRANULOCYTES # BLD AUTO: 0.19 X10*3/UL (ref 0–0.5)
IMM GRANULOCYTES NFR BLD AUTO: 0.8 % (ref 0–0.9)
KETONES UR STRIP.AUTO-MCNC: ABNORMAL MG/DL
LACTATE SERPL-SCNC: 1.8 MMOL/L (ref 0.4–2)
LEUKOCYTE ESTERASE UR QL STRIP.AUTO: ABNORMAL
LYMPHOCYTES # BLD MANUAL: 3.43 X10*3/UL (ref 0.8–3)
LYMPHOCYTES NFR BLD MANUAL: 14 %
MAGNESIUM SERPL-MCNC: 1.69 MG/DL (ref 1.6–2.4)
MCH RBC QN AUTO: 32.5 PG (ref 26–34)
MCHC RBC AUTO-ENTMCNC: 33.9 G/DL (ref 32–36)
MCV RBC AUTO: 96 FL (ref 80–100)
MONOCYTES # BLD MANUAL: 0.74 X10*3/UL (ref 0.05–0.8)
MONOCYTES NFR BLD MANUAL: 3 %
MUCOUS THREADS #/AREA URNS AUTO: ABNORMAL /LPF
NEUTROPHILS # BLD MANUAL: 20.09 X10*3/UL (ref 1.6–5.5)
NEUTS BAND # BLD MANUAL: 0.49 X10*3/UL (ref 0–0.5)
NEUTS BAND NFR BLD MANUAL: 2 %
NEUTS SEG # BLD MANUAL: 19.6 X10*3/UL (ref 1.6–5)
NEUTS SEG NFR BLD MANUAL: 80 %
NITRITE UR QL STRIP.AUTO: NEGATIVE
NRBC BLD-RTO: 0 /100 WBCS (ref 0–0)
PH UR STRIP.AUTO: 5 [PH]
PLATELET # BLD AUTO: 708 X10*3/UL (ref 150–450)
POTASSIUM SERPL-SCNC: 4 MMOL/L (ref 3.5–5.3)
PROT SERPL-MCNC: 7.4 G/DL (ref 6.4–8.2)
PROT UR STRIP.AUTO-MCNC: ABNORMAL MG/DL
RBC # BLD AUTO: 4.4 X10*6/UL (ref 4.5–5.9)
RBC # UR STRIP.AUTO: ABNORMAL /UL
RBC #/AREA URNS AUTO: ABNORMAL /HPF
RBC MORPH BLD: ABNORMAL
SODIUM SERPL-SCNC: 132 MMOL/L (ref 136–145)
SP GR UR STRIP.AUTO: 1.03
TOTAL CELLS COUNTED BLD: 100
UROBILINOGEN UR STRIP.AUTO-MCNC: 4 MG/DL
WBC # BLD AUTO: 24.5 X10*3/UL (ref 4.4–11.3)
WBC #/AREA URNS AUTO: ABNORMAL /HPF

## 2024-05-13 PROCEDURE — 96368 THER/DIAG CONCURRENT INF: CPT

## 2024-05-13 PROCEDURE — 83605 ASSAY OF LACTIC ACID: CPT | Performed by: STUDENT IN AN ORGANIZED HEALTH CARE EDUCATION/TRAINING PROGRAM

## 2024-05-13 PROCEDURE — 87449 NOS EACH ORGANISM AG IA: CPT | Mod: PORLAB | Performed by: STUDENT IN AN ORGANIZED HEALTH CARE EDUCATION/TRAINING PROGRAM

## 2024-05-13 PROCEDURE — 93005 ELECTROCARDIOGRAM TRACING: CPT

## 2024-05-13 PROCEDURE — 99223 1ST HOSP IP/OBS HIGH 75: CPT | Performed by: STUDENT IN AN ORGANIZED HEALTH CARE EDUCATION/TRAINING PROGRAM

## 2024-05-13 PROCEDURE — 36415 COLL VENOUS BLD VENIPUNCTURE: CPT | Performed by: NURSE PRACTITIONER

## 2024-05-13 PROCEDURE — 82947 ASSAY GLUCOSE BLOOD QUANT: CPT | Mod: 91

## 2024-05-13 PROCEDURE — 96365 THER/PROPH/DIAG IV INF INIT: CPT

## 2024-05-13 PROCEDURE — 85027 COMPLETE CBC AUTOMATED: CPT | Performed by: STUDENT IN AN ORGANIZED HEALTH CARE EDUCATION/TRAINING PROGRAM

## 2024-05-13 PROCEDURE — 84075 ASSAY ALKALINE PHOSPHATASE: CPT | Performed by: STUDENT IN AN ORGANIZED HEALTH CARE EDUCATION/TRAINING PROGRAM

## 2024-05-13 PROCEDURE — 2500000005 HC RX 250 GENERAL PHARMACY W/O HCPCS: Performed by: STUDENT IN AN ORGANIZED HEALTH CARE EDUCATION/TRAINING PROGRAM

## 2024-05-13 PROCEDURE — 83880 ASSAY OF NATRIURETIC PEPTIDE: CPT | Performed by: NURSE PRACTITIONER

## 2024-05-13 PROCEDURE — 74177 CT ABD & PELVIS W/CONTRAST: CPT

## 2024-05-13 PROCEDURE — 93010 ELECTROCARDIOGRAM REPORT: CPT | Performed by: INTERNAL MEDICINE

## 2024-05-13 PROCEDURE — 96367 TX/PROPH/DG ADDL SEQ IV INF: CPT

## 2024-05-13 PROCEDURE — 87040 BLOOD CULTURE FOR BACTERIA: CPT | Mod: 91,PORLAB | Performed by: NURSE PRACTITIONER

## 2024-05-13 PROCEDURE — 2060000001 HC INTERMEDIATE ICU ROOM DAILY

## 2024-05-13 PROCEDURE — 2500000001 HC RX 250 WO HCPCS SELF ADMINISTERED DRUGS (ALT 637 FOR MEDICARE OP): Performed by: STUDENT IN AN ORGANIZED HEALTH CARE EDUCATION/TRAINING PROGRAM

## 2024-05-13 PROCEDURE — 96366 THER/PROPH/DIAG IV INF ADDON: CPT

## 2024-05-13 PROCEDURE — 82947 ASSAY GLUCOSE BLOOD QUANT: CPT

## 2024-05-13 PROCEDURE — 71275 CT ANGIOGRAPHY CHEST: CPT

## 2024-05-13 PROCEDURE — 2500000004 HC RX 250 GENERAL PHARMACY W/ HCPCS (ALT 636 FOR OP/ED): Performed by: STUDENT IN AN ORGANIZED HEALTH CARE EDUCATION/TRAINING PROGRAM

## 2024-05-13 PROCEDURE — 2500000002 HC RX 250 W HCPCS SELF ADMINISTERED DRUGS (ALT 637 FOR MEDICARE OP, ALT 636 FOR OP/ED): Performed by: STUDENT IN AN ORGANIZED HEALTH CARE EDUCATION/TRAINING PROGRAM

## 2024-05-13 PROCEDURE — 87086 URINE CULTURE/COLONY COUNT: CPT | Mod: PORLAB | Performed by: STUDENT IN AN ORGANIZED HEALTH CARE EDUCATION/TRAINING PROGRAM

## 2024-05-13 PROCEDURE — 2500000006 HC RX 250 W HCPCS SELF ADMINISTERED DRUGS (ALT 637 FOR ALL PAYERS): Mod: MUE | Performed by: STUDENT IN AN ORGANIZED HEALTH CARE EDUCATION/TRAINING PROGRAM

## 2024-05-13 PROCEDURE — 81001 URINALYSIS AUTO W/SCOPE: CPT | Performed by: STUDENT IN AN ORGANIZED HEALTH CARE EDUCATION/TRAINING PROGRAM

## 2024-05-13 PROCEDURE — 2550000001 HC RX 255 CONTRASTS: Performed by: STUDENT IN AN ORGANIZED HEALTH CARE EDUCATION/TRAINING PROGRAM

## 2024-05-13 PROCEDURE — 85007 BL SMEAR W/DIFF WBC COUNT: CPT | Performed by: STUDENT IN AN ORGANIZED HEALTH CARE EDUCATION/TRAINING PROGRAM

## 2024-05-13 PROCEDURE — 84484 ASSAY OF TROPONIN QUANT: CPT | Performed by: NURSE PRACTITIONER

## 2024-05-13 PROCEDURE — 74177 CT ABD & PELVIS W/CONTRAST: CPT | Mod: FOREIGN READ | Performed by: RADIOLOGY

## 2024-05-13 PROCEDURE — 87899 AGENT NOS ASSAY W/OPTIC: CPT | Mod: PORLAB | Performed by: STUDENT IN AN ORGANIZED HEALTH CARE EDUCATION/TRAINING PROGRAM

## 2024-05-13 PROCEDURE — 83735 ASSAY OF MAGNESIUM: CPT | Performed by: STUDENT IN AN ORGANIZED HEALTH CARE EDUCATION/TRAINING PROGRAM

## 2024-05-13 PROCEDURE — 2500000004 HC RX 250 GENERAL PHARMACY W/ HCPCS (ALT 636 FOR OP/ED): Performed by: NURSE PRACTITIONER

## 2024-05-13 PROCEDURE — 99285 EMERGENCY DEPT VISIT HI MDM: CPT | Mod: 25

## 2024-05-13 PROCEDURE — 81001 URINALYSIS AUTO W/SCOPE: CPT | Performed by: NURSE PRACTITIONER

## 2024-05-13 RX ORDER — CEFTRIAXONE 2 G/50ML
2 INJECTION, SOLUTION INTRAVENOUS EVERY 24 HOURS
Status: DISCONTINUED | OUTPATIENT
Start: 2024-05-13 | End: 2024-05-17 | Stop reason: HOSPADM

## 2024-05-13 RX ORDER — NAPROXEN SODIUM 220 MG/1
81 TABLET, FILM COATED ORAL DAILY
Status: DISCONTINUED | OUTPATIENT
Start: 2024-05-13 | End: 2024-05-17 | Stop reason: HOSPADM

## 2024-05-13 RX ORDER — BISACODYL 5 MG
10 TABLET, DELAYED RELEASE (ENTERIC COATED) ORAL DAILY PRN
Status: DISCONTINUED | OUTPATIENT
Start: 2024-05-13 | End: 2024-05-17 | Stop reason: HOSPADM

## 2024-05-13 RX ORDER — IPRATROPIUM BROMIDE AND ALBUTEROL SULFATE 2.5; .5 MG/3ML; MG/3ML
3 SOLUTION RESPIRATORY (INHALATION)
Status: DISCONTINUED | OUTPATIENT
Start: 2024-05-13 | End: 2024-05-13

## 2024-05-13 RX ORDER — ACETAMINOPHEN 325 MG/1
975 TABLET ORAL ONCE
Status: COMPLETED | OUTPATIENT
Start: 2024-05-13 | End: 2024-05-13

## 2024-05-13 RX ORDER — PANTOPRAZOLE SODIUM 40 MG/10ML
40 INJECTION, POWDER, LYOPHILIZED, FOR SOLUTION INTRAVENOUS
Status: DISCONTINUED | OUTPATIENT
Start: 2024-05-14 | End: 2024-05-17 | Stop reason: HOSPADM

## 2024-05-13 RX ORDER — PANTOPRAZOLE SODIUM 40 MG/1
40 TABLET, DELAYED RELEASE ORAL
Status: DISCONTINUED | OUTPATIENT
Start: 2024-05-14 | End: 2024-05-17 | Stop reason: HOSPADM

## 2024-05-13 RX ORDER — TAMSULOSIN HYDROCHLORIDE 0.4 MG/1
0.4 CAPSULE ORAL DAILY
Status: DISCONTINUED | OUTPATIENT
Start: 2024-05-13 | End: 2024-05-17 | Stop reason: HOSPADM

## 2024-05-13 RX ORDER — TALC
3 POWDER (GRAM) TOPICAL NIGHTLY PRN
Status: DISCONTINUED | OUTPATIENT
Start: 2024-05-13 | End: 2024-05-17 | Stop reason: HOSPADM

## 2024-05-13 RX ORDER — ACETAMINOPHEN 325 MG/1
650 TABLET ORAL EVERY 4 HOURS PRN
Status: DISCONTINUED | OUTPATIENT
Start: 2024-05-13 | End: 2024-05-17 | Stop reason: HOSPADM

## 2024-05-13 RX ORDER — FLUTICASONE PROPIONATE 50 MCG
1 SPRAY, SUSPENSION (ML) NASAL DAILY PRN
Status: DISCONTINUED | OUTPATIENT
Start: 2024-05-13 | End: 2024-05-17 | Stop reason: HOSPADM

## 2024-05-13 RX ORDER — ONDANSETRON 4 MG/1
4 TABLET, FILM COATED ORAL EVERY 8 HOURS PRN
Status: DISCONTINUED | OUTPATIENT
Start: 2024-05-13 | End: 2024-05-17 | Stop reason: HOSPADM

## 2024-05-13 RX ORDER — DILTIAZEM HYDROCHLORIDE 240 MG/1
240 CAPSULE, COATED, EXTENDED RELEASE ORAL DAILY
Status: DISCONTINUED | OUTPATIENT
Start: 2024-05-14 | End: 2024-05-17 | Stop reason: HOSPADM

## 2024-05-13 RX ORDER — ALBUTEROL SULFATE 90 UG/1
2 AEROSOL, METERED RESPIRATORY (INHALATION) EVERY 6 HOURS PRN
Status: DISCONTINUED | OUTPATIENT
Start: 2024-05-13 | End: 2024-05-17 | Stop reason: HOSPADM

## 2024-05-13 RX ORDER — POLYETHYLENE GLYCOL 3350 17 G/17G
17 POWDER, FOR SOLUTION ORAL DAILY
Status: DISCONTINUED | OUTPATIENT
Start: 2024-05-13 | End: 2024-05-17 | Stop reason: HOSPADM

## 2024-05-13 RX ORDER — MAGNESIUM SULFATE HEPTAHYDRATE 40 MG/ML
2 INJECTION, SOLUTION INTRAVENOUS ONCE
Status: COMPLETED | OUTPATIENT
Start: 2024-05-13 | End: 2024-05-13

## 2024-05-13 RX ORDER — IPRATROPIUM BROMIDE AND ALBUTEROL SULFATE 2.5; .5 MG/3ML; MG/3ML
3 SOLUTION RESPIRATORY (INHALATION) EVERY 2 HOUR PRN
Status: DISCONTINUED | OUTPATIENT
Start: 2024-05-13 | End: 2024-05-17 | Stop reason: HOSPADM

## 2024-05-13 RX ORDER — GUAIFENESIN 600 MG/1
600 TABLET, EXTENDED RELEASE ORAL EVERY 12 HOURS PRN
Status: DISCONTINUED | OUTPATIENT
Start: 2024-05-13 | End: 2024-05-17 | Stop reason: HOSPADM

## 2024-05-13 RX ORDER — BISACODYL 10 MG/1
10 SUPPOSITORY RECTAL DAILY PRN
Status: DISCONTINUED | OUTPATIENT
Start: 2024-05-13 | End: 2024-05-17 | Stop reason: HOSPADM

## 2024-05-13 RX ORDER — ONDANSETRON HYDROCHLORIDE 2 MG/ML
4 INJECTION, SOLUTION INTRAVENOUS EVERY 8 HOURS PRN
Status: DISCONTINUED | OUTPATIENT
Start: 2024-05-13 | End: 2024-05-17 | Stop reason: HOSPADM

## 2024-05-13 RX ORDER — ENOXAPARIN SODIUM 100 MG/ML
40 INJECTION SUBCUTANEOUS EVERY 24 HOURS
Status: DISCONTINUED | OUTPATIENT
Start: 2024-05-13 | End: 2024-05-14 | Stop reason: SDUPTHER

## 2024-05-13 RX ADMIN — VANCOMYCIN HYDROCHLORIDE 1500 MG: 1.5 INJECTION, POWDER, LYOPHILIZED, FOR SOLUTION INTRAVENOUS at 11:26

## 2024-05-13 RX ADMIN — CEFTRIAXONE SODIUM 2 G: 2 INJECTION, SOLUTION INTRAVENOUS at 17:35

## 2024-05-13 RX ADMIN — PIPERACILLIN SODIUM AND TAZOBACTAM SODIUM 4.5 G: 4; .5 INJECTION, SOLUTION INTRAVENOUS at 10:52

## 2024-05-13 RX ADMIN — MAGNESIUM SULFATE HEPTAHYDRATE 2 G: 40 INJECTION, SOLUTION INTRAVENOUS at 11:22

## 2024-05-13 RX ADMIN — DOXYCYCLINE 100 MG: 100 INJECTION, POWDER, LYOPHILIZED, FOR SOLUTION INTRAVENOUS at 16:15

## 2024-05-13 RX ADMIN — SODIUM CHLORIDE 1000 ML: 9 INJECTION, SOLUTION INTRAVENOUS at 10:53

## 2024-05-13 RX ADMIN — Medication 2 L/MIN: at 15:55

## 2024-05-13 RX ADMIN — ENOXAPARIN SODIUM 40 MG: 40 INJECTION SUBCUTANEOUS at 21:00

## 2024-05-13 RX ADMIN — ACETAMINOPHEN 975 MG: 325 TABLET ORAL at 10:53

## 2024-05-13 RX ADMIN — ASPIRIN 81 MG CHEWABLE TABLET 81 MG: 81 TABLET CHEWABLE at 16:43

## 2024-05-13 RX ADMIN — SODIUM CHLORIDE, POTASSIUM CHLORIDE, SODIUM LACTATE AND CALCIUM CHLORIDE 1000 ML: 600; 310; 30; 20 INJECTION, SOLUTION INTRAVENOUS at 12:25

## 2024-05-13 RX ADMIN — IPRATROPIUM BROMIDE AND ALBUTEROL SULFATE 3 ML: 2.5; .5 SOLUTION RESPIRATORY (INHALATION) at 16:43

## 2024-05-13 RX ADMIN — IOHEXOL 75 ML: 350 INJECTION, SOLUTION INTRAVENOUS at 13:18

## 2024-05-13 RX ADMIN — TAMSULOSIN HYDROCHLORIDE 0.4 MG: 0.4 CAPSULE ORAL at 16:43

## 2024-05-13 ASSESSMENT — PAIN - FUNCTIONAL ASSESSMENT
PAIN_FUNCTIONAL_ASSESSMENT: 0-10

## 2024-05-13 ASSESSMENT — LIFESTYLE VARIABLES
HAVE PEOPLE ANNOYED YOU BY CRITICIZING YOUR DRINKING: NO
TOTAL SCORE: 0
EVER FELT BAD OR GUILTY ABOUT YOUR DRINKING: NO
HAVE YOU EVER FELT YOU SHOULD CUT DOWN ON YOUR DRINKING: NO
EVER HAD A DRINK FIRST THING IN THE MORNING TO STEADY YOUR NERVES TO GET RID OF A HANGOVER: NO

## 2024-05-13 ASSESSMENT — ENCOUNTER SYMPTOMS
TREMORS: 0
SINUS PRESSURE: 1
APPETITE CHANGE: 1
CONFUSION: 0
LIGHT-HEADEDNESS: 0
VOMITING: 0
DIZZINESS: 0
BLOOD IN STOOL: 0
SHORTNESS OF BREATH: 1
FATIGUE: 1
PALPITATIONS: 0
CHEST TIGHTNESS: 0
POLYDIPSIA: 0
WHEEZING: 0
DIARRHEA: 0
PHOTOPHOBIA: 0
CHILLS: 1
HEMATURIA: 0
ABDOMINAL PAIN: 0
SLEEP DISTURBANCE: 0
NAUSEA: 0
COLOR CHANGE: 0
DYSURIA: 0
FEVER: 0
WEAKNESS: 0
COUGH: 1

## 2024-05-13 ASSESSMENT — COGNITIVE AND FUNCTIONAL STATUS - GENERAL
DAILY ACTIVITIY SCORE: 24
MOBILITY SCORE: 23
CLIMB 3 TO 5 STEPS WITH RAILING: A LITTLE

## 2024-05-13 ASSESSMENT — PAIN SCALES - GENERAL
PAINLEVEL_OUTOF10: 0 - NO PAIN

## 2024-05-13 ASSESSMENT — COLUMBIA-SUICIDE SEVERITY RATING SCALE - C-SSRS
6. HAVE YOU EVER DONE ANYTHING, STARTED TO DO ANYTHING, OR PREPARED TO DO ANYTHING TO END YOUR LIFE?: NO
2. HAVE YOU ACTUALLY HAD ANY THOUGHTS OF KILLING YOURSELF?: NO
1. IN THE PAST MONTH, HAVE YOU WISHED YOU WERE DEAD OR WISHED YOU COULD GO TO SLEEP AND NOT WAKE UP?: NO

## 2024-05-13 NOTE — H&P
[facility] - GENERAL MEDICINE HISTORY AND PHYSICAL    History Obtained From: Patient, daughter at bedside     History Of Present Illness:  Cedric Mohan is a 79 y.o. male with PMHx s/f HTN, tobacco use disorder (no dx COPD, no PFTs), BPH with LUTS, bladder cancer s/p TURBT and mitomycin intravesical instillation (01/2023), presenting with shortness of breath, productive cough (clear sputum), congestion, decreased appetite, and general malaise progressively worsening x ~3 weeks.  Patient had been in his typical state of health until about 3 weeks ago when he noticed some sinus congestion, noting both his daughter whom he lives with and son-in-law had also had some sinus congestion around that time.  However, since then, he feels like the congestion has moved down into his chest.  He has had progressively worsening cough, and notes when he coughs he does have some right-sided rib pain.  He has felt very short of breath when getting up and moving around, but is also experienced it intermittently at rest.  He has some chronic chills, but has not really noticed any particular fever, rigors, night sweats.  He believes he has been eating and drinking near his baseline, but his daughter at the bedside notes he has had decreased appetite and not really eating as much is normal over the last 3 weeks.  He denies chest pain/pressure, dizziness or lightheadedness, diaphoresis, orthopnea, lower extremity edema, nausea, vomiting, abdominal pain, diarrhea, changes in urination, headache, vision changes, focal and/or lateralizing sensory or motor deficits.  Note that while patient was in the emergency department, he had an episode of atrial fibrillation with rapid ventricular response; patient denies any prior history of A-fib or other arrhythmias.    ED Course (Summary):   Vitals on presentation: T101.3, /74, , RR 28, SpO2 93% RA (dropped down to <90% and is currently on 2 L nasal cannula at  94%)  Labs: CBC with WBC 24.5 and left shift, Hgb 14.3, platelets 708.  CMP with glucose 125, sodium 132, potassium 4.0, bicarb 20, BUN 15, serum creatinine 0.88.  Magnesium 1.69.  BNP 25.  High-sensitivity troponin 13.  Lactate 1.8.  UA: Trace leukocyte esterase, 21-50 WBCs.  Imaging: CT PE and CT abdomen pelvis with IV contrast is notable for right middle lobe infiltrate and probable dependent changes in the right lung base, no acute pathologies in the abdomen and pelvis; there is evidence of chronic emphysematous disease in the chest  Interventions: 1 L normal saline, 1 L LR, vancomycin/Zosyn, 975 mg Tylenol, 2 g IV magnesium    ED Course (From Provider):  ED Course as of 05/13/24 1637   Mon May 13, 2024   1037 ECG 12 lead  Twelve-lead EKG interpreted by me.  Sinus tachycardia at a rate of 124.  HI interval is 120, QRS is 118, QT is 329, QTc is 473.  Left axis deviation.  Low voltage in precordial leads.  No acute ischemia or injury pattern noted. [CT]   1127 Repeat EKG as interpreted by myself demonstrates atrial fibrillation with rapid ventricular response with a rate of 184, PVCs noted, normal QRS with prolonged QTc interval, no evidence of an acute STEMI. [NS]   1231 3rd EKG as interpreted by myself demonstrates sinus rhythm with a ventricular rate of 96, normal axis, normal intervals, no evidence of an acute STEMI or malignant arrhythmia. [NS]      ED Course User Index  [CT] Hoa Crain, APRN-CNP  [NS] Earl Perez MD         Diagnoses as of 05/13/24 1637   Sepsis due to pneumonia (Multi)     Relevant Results  Results for orders placed or performed during the hospital encounter of 05/13/24 (from the past 24 hour(s))   CBC with Differential   Result Value Ref Range    WBC 24.5 (H) 4.4 - 11.3 x10*3/uL    nRBC 0.0 0.0 - 0.0 /100 WBCs    RBC 4.40 (L) 4.50 - 5.90 x10*6/uL    Hemoglobin 14.3 13.5 - 17.5 g/dL    Hematocrit 42.2 41.0 - 52.0 %    MCV 96 80 - 100 fL    MCH 32.5 26.0 - 34.0 pg    MCHC  33.9 32.0 - 36.0 g/dL    RDW 12.9 11.5 - 14.5 %    Platelets 708 (H) 150 - 450 x10*3/uL    Immature Granulocytes %, Automated 0.8 0.0 - 0.9 %    Immature Granulocytes Absolute, Automated 0.19 0.00 - 0.50 x10*3/uL   Comprehensive Metabolic Panel   Result Value Ref Range    Glucose 125 (H) 74 - 99 mg/dL    Sodium 132 (L) 136 - 145 mmol/L    Potassium 4.0 3.5 - 5.3 mmol/L    Chloride 99 98 - 107 mmol/L    Bicarbonate 20 (L) 21 - 32 mmol/L    Anion Gap 17 10 - 20 mmol/L    Urea Nitrogen 15 6 - 23 mg/dL    Creatinine 0.88 0.50 - 1.30 mg/dL    eGFR 88 >60 mL/min/1.73m*2    Calcium 8.7 8.6 - 10.3 mg/dL    Albumin 3.4 3.4 - 5.0 g/dL    Alkaline Phosphatase 170 (H) 33 - 136 U/L    Total Protein 7.4 6.4 - 8.2 g/dL    AST 31 9 - 39 U/L    Bilirubin, Total 1.1 0.0 - 1.2 mg/dL    ALT 30 10 - 52 U/L   Lactate   Result Value Ref Range    Lactate 1.8 0.4 - 2.0 mmol/L   B-type natriuretic peptide   Result Value Ref Range    BNP 25 0 - 99 pg/mL   Troponin I, High Sensitivity, Initial   Result Value Ref Range    Troponin I, High Sensitivity 13 0 - 20 ng/L   Magnesium   Result Value Ref Range    Magnesium 1.69 1.60 - 2.40 mg/dL   Manual Differential   Result Value Ref Range    Neutrophils %, Manual 80.0 40.0 - 80.0 %    Bands %, Manual 2.0 0.0 - 5.0 %    Lymphocytes %, Manual 14.0 13.0 - 44.0 %    Monocytes %, Manual 3.0 2.0 - 10.0 %    Eosinophils %, Manual 0.0 0.0 - 6.0 %    Basophils %, Manual 1.0 0.0 - 2.0 %    Seg Neutrophils Absolute, Manual 19.60 (H) 1.60 - 5.00 x10*3/uL    Bands Absolute, Manual 0.49 0.00 - 0.50 x10*3/uL    Lymphocytes Absolute, Manual 3.43 (H) 0.80 - 3.00 x10*3/uL    Monocytes Absolute, Manual 0.74 0.05 - 0.80 x10*3/uL    Eosinophils Absolute, Manual 0.00 0.00 - 0.40 x10*3/uL    Basophils Absolute, Manual 0.25 (H) 0.00 - 0.10 x10*3/uL    Total Cells Counted 100     Neutrophils Absolute, Manual 20.09 (H) 1.60 - 5.50 x10*3/uL    RBC Morphology No significant RBC morphology present    ECG 12 lead   Result Value  Ref Range    Ventricular Rate 124 BPM    Atrial Rate 124 BPM    OR Interval 120 ms    QRS Duration 118 ms    QT Interval 329 ms    QTC Calculation(Bazett) 473 ms    P Axis 69 degrees    R Axis 242 degrees    T Axis 66 degrees    QRS Count 19 beats    Q Onset 254 ms    T Offset 419 ms    QTC Fredericia 418 ms   Blood Culture    Specimen: Peripheral Venipuncture; Blood culture   Result Value Ref Range    Blood Culture Loaded on Instrument - Culture in progress    Blood Culture    Specimen: Peripheral Venipuncture; Blood culture   Result Value Ref Range    Blood Culture Loaded on Instrument - Culture in progress    Urinalysis with Reflex Culture and Microscopic   Result Value Ref Range    Color, Urine Shantal (N) Straw, Yellow    Appearance, Urine Hazy (N) Clear    Specific Gravity, Urine 1.027 1.005 - 1.035    pH, Urine 5.0 5.0, 5.5, 6.0, 6.5, 7.0, 7.5, 8.0    Protein, Urine 100 (2+) (N) NEGATIVE mg/dL    Glucose, Urine NEGATIVE NEGATIVE mg/dL    Blood, Urine SMALL (1+) (A) NEGATIVE    Ketones, Urine 5 (TRACE) (A) NEGATIVE mg/dL    Bilirubin, Urine NEGATIVE NEGATIVE    Urobilinogen, Urine 4.0 (N) <2.0 mg/dL    Nitrite, Urine NEGATIVE NEGATIVE    Leukocyte Esterase, Urine TRACE (A) NEGATIVE   Microscopic Only, Urine   Result Value Ref Range    WBC, Urine 21-50 (A) 1-5, NONE /HPF    RBC, Urine 6-10 (A) NONE, 1-2, 3-5 /HPF    Mucus, Urine 1+ Reference range not established. /LPF   ECG 12 lead   Result Value Ref Range    Ventricular Rate 184 BPM    Atrial Rate 185 BPM    OR Interval 67 ms    QRS Duration 87 ms    QT Interval 299 ms    QTC Calculation(Bazett) 524 ms    P Axis 29 degrees    R Axis -4 degrees    T Axis 57 degrees    QRS Count 30 beats    Q Onset 251 ms    T Offset 401 ms    QTC Fredericia 434 ms   ECG 12 lead   Result Value Ref Range    Ventricular Rate 96 BPM    Atrial Rate 96 BPM    OR Interval 134 ms    QRS Duration 88 ms    QT Interval 339 ms    QTC Calculation(Bazett) 429 ms    P Axis 53 degrees    R Axis  33 degrees    T Axis 52 degrees    QRS Count 16 beats    Q Onset 251 ms    T Offset 421 ms    QTC Fredericia 396 ms   POCT GLUCOSE   Result Value Ref Range    POCT Glucose 102 (H) 74 - 99 mg/dL      CT abdomen pelvis w IV contrast    Result Date: 5/13/2024  STUDY: CT Abdomen and Pelvis with IV Contrast; 5/13/2024 1:20 PM INDICATION: Leukocytosis. COMPARISON: CT urogram 12/5/2023, 12/14/2022. ACCESSION NUMBER(S): [id number] ORDERING CLINICIAN: YINKA GONZALEZ TECHNIQUE: CT of the abdomen and pelvis was performed.  Contiguous axial images were obtained at 3 mm slice thickness through the abdomen and pelvis. Coronal and sagittal reconstructions at 3 mm slice thickness were performed.  Omnipaque 350--75 mL was administered intravenously.  FINDINGS: LOWER CHEST: No cardiomegaly.  No pericardial effusion.  Chronic emphysematous changes are seen in both lungs.  There are infiltrates in the right middle lobe.  Infiltrate and/or dependent changes noted in the posterior right lower lobe.  These findings were not seen on the prior study of 12/5/2023.  ABDOMEN:  LIVER: No hepatomegaly.  Smooth surface contour.  Normal attenuation.  BILE DUCTS: No intrahepatic or extrahepatic biliary ductal dilatation.  GALLBLADDER: The gallbladder is normal. STOMACH: Study is diffusely thickened however it is not well distended and this is most likely normal..  PANCREAS: No masses or ductal dilatation.  SPLEEN: No splenomegaly or focal splenic lesion.  ADRENAL GLANDS: There is thickening of the left adrenal gland, most likely representing hypertrophy.  KIDNEYS AND URETERS: There are multiple renal cysts as seen previously  No renal or ureteral calculi.  PELVIS:  BLADDER: Chronic calcifications noted in the dependent portion of the bladder. Chronic thickening of the bladder base which may be the result of encroachment by the prostate.  Prostate seed implants are also noted in place.  REPRODUCTIVE ORGANS: No abnormalities identified.  BOWEL:  There is diverticulosis of the sigmoid without evidence of inflammation.  There is a normal appendix.  VESSELS: No abnormalities identified.  Abdominal aorta is normal in caliber.  PERITONEUM/RETROPERITONEUM/LYMPH NODES: No free fluid.  No pneumoperitoneum. No lymphadenopathy.  ABDOMINAL WALL: No abnormalities identified. SOFT TISSUES: No abnormalities identified.  BONES: No acute fracture or aggressive osseous lesion.  Multilevel moderately advanced degenerative disc disease noted.    No focal acute pathology demonstrated in the abdomen and pelvis. Chronic lateral calcifications, prostatic seed implants and thickening at the base of the bladder all stable. Acute appearing infiltrate in the right middle lobe and probable dependent changes at the right lung base.  These findings are superimposed on chronic emphysematous disease.. Signed by Rory Orr    CT angio chest for pulmonary embolism    Result Date: 5/13/2024  These images are not reportable by radiology and will not be interpreted by  Radiologists.    ECG 12 lead    Result Date: 5/13/2024  Sinus rhythm    ECG 12 lead    Result Date: 5/13/2024  Atrial fibrillation with rapid V-rate Ventricular premature complex S1,S2,S3 pattern RSR' in V1 or V2, right VCD or RVH    ECG 12 lead    Result Date: 5/13/2024  Sinus tachycardia LAD, consider left anterior fascicular block Low voltage, precordial leads    Scheduled medications:  aspirin, 81 mg, oral, Daily  cefTRIAXone, 2 g, intravenous, q24h  [START ON 5/14/2024] dilTIAZem CD, 240 mg, oral, Daily  doxycycline, 100 mg, intravenous, Once  doxycycline, 100 mg, intravenous, q12h  enoxaparin, 40 mg, subcutaneous, q24h  ipratropium-albuteroL, 3 mL, nebulization, q8h  oxygen, , inhalation, Continuous - Inhalation  [START ON 5/14/2024] pantoprazole, 40 mg, oral, Daily before breakfast   Or  [START ON 5/14/2024] pantoprazole, 40 mg, intravenous, Daily before breakfast  polyethylene glycol, 17 g, oral, Daily  tamsulosin,  0.4 mg, oral, Daily      Continuous medications:     PRN medications:  PRN medications: acetaminophen, bisacodyl, bisacodyl, fluticasone, guaiFENesin, melatonin, ondansetron **OR** ondansetron      Past Medical History  He has a past medical history of Other conditions influencing health status, Other conditions influencing health status, and Other conditions influencing health status.    Surgical History  He has a past surgical history that includes Other surgical history (05/23/2013); Hernia repair (07/05/2013); Other surgical history (03/17/2017); Hernia repair (06/11/2013); Other surgical history (06/11/2013); and Other surgical history (06/11/2013).     Social History  He reports that he has been smoking cigarettes. He has been exposed to tobacco smoke. He has never used smokeless tobacco. He reports that he does not drink alcohol and does not use drugs.  He started smoking at age 18, is currently only smoking 3-4 cigarettes/day.  History of social alcohol use, not currently drinking  Denies drug use  He lives with his daughter and son-in-law, his Calico cat, and his daughter's animals    Family History  Family History   Problem Relation Name Age of Onset    Breast cancer Mother      Other (stroke syndrome) Mother      Prostate cancer Brother       Allergies  Ciprofloxacin, Droxy, Alfuzosin, Azithromycin, and Tramadol    Code Status  Full Code     Review of Systems   Constitutional:  Positive for appetite change, chills and fatigue. Negative for fever.   HENT:  Positive for congestion and sinus pressure.    Eyes:  Negative for photophobia and visual disturbance.   Respiratory:  Positive for cough and shortness of breath. Negative for chest tightness and wheezing.    Cardiovascular:  Negative for chest pain, palpitations and leg swelling.   Gastrointestinal:  Negative for abdominal pain, blood in stool, diarrhea, nausea and vomiting.   Endocrine: Negative for polydipsia and polyuria.   Genitourinary:  Negative  for decreased urine volume, dysuria, hematuria and urgency.        Chronic incomplete emptying    Skin:  Negative for color change and rash.   Neurological:  Negative for dizziness, tremors, syncope, weakness and light-headedness.   Psychiatric/Behavioral:  Negative for confusion and sleep disturbance.    All other systems reviewed and are negative.    Last Recorded Vitals  /78   Pulse 92   Temp 37.1 °C (98.8 °F)   Resp 18   Wt 66.2 kg (145 lb 15.1 oz)   SpO2 94%      Physical Exam:  Vital signs and nursing notes reviewed. Daughter at the bedside.   Constitutional: Pleasant and cooperative. Laying in bed in no acute distress.   Skin: Warm and dry; no obvious lesions, rashes, pallor, or jaundice. Seborrheic keratoses on the back predominantly.  Eyes: EOMI. Anicteric sclera.   ENT: Mucous membranes moist; no obvious injury or deformity appreciated.   Head and Neck: Normocephalic, atraumatic. ROM preserved. Trachea midline. No appreciable JVD.   Respiratory: Nonlabored on 2L NC. Chest rise is equal.  Rhonchi throughout the right upper lobe and right middle lobe anteriorly and posteriorly; diminished throughout all other lung fields without overt adventitious sounds.  Cardiovascular: Sinus tachycardia on telemetry to the 90s. No gross murmur, gallop, or rub. Extremities are warm and well-perfused with good capillary refill (< 3 seconds). No chest wall tenderness.   Gastro: Abdomen soft, nontender, nondistended. No obvious organomegaly appreciated. Bowel sounds are present and normoactive.  : No CVA tenderness.   MSK: No gross abnormalities appreciated. No limitations to AROM/PROM appreciated.   Extremities: No cyanosis, edema, or clubbing evident. Neurovascularly intact.   Neuro: A&Ox3. CN 2-12 grossly intact. Able to respond to questions appropriately and clearly. No new focal neurologic deficits appreciated.  Psych: Appropriate mood and behavior.    Assessment/Plan   Principal Problem:    Sepsis due to  pneumonia (Multi)  Active Problems:    Adenocarcinoma of prostate (Multi)    Cervical disc disorder    Enlarged prostate with lower urinary tract symptoms (LUTS)    Peptic reflux disease    Tobacco abuse    Atrial fibrillation with rapid ventricular response (Multi)    Sepsis without septic shock (Multi)    Acute cystitis without hematuria    Essential hypertension    Acute hypoxic respiratory failure (Multi)    79 y.o. male with PMHx s/f HTN, tobacco use disorder (no dx COPD, no PFTs), BPH with LUTS, bladder cancer s/p TURBT and mitomycin intravesical instillation (01/2023), presenting with shortness of breath, productive cough (clear sputum), congestion, decreased appetite, and general malaise progressively worsening x ~3 weeks.    Sepsis with acute organ dysfunction without shock 2/2 RML PNA (CAP), UTI -- POA  -SIRS criteria (4/4)   -Source: Pulm (see imaging results in HPI and results section) and  (UA: Trace leukocyte esterase, 21-50 WBCs, remainder pending at this time)  -End-organ dysfunction: hypoxia, transient atrial fibrillation with RVR (new arrhythmia)  -Lactate 1.8  -BP is normotensive. Has gotten 2L IVF.   -Blood cultures x2. Urine cx. MRSA nares. Urine antigens. Sputum culture.   -Continue antibiotic coverage with Ceftriaxone 2g q24h and Doxycycline 100mg BID (reactions to ciprofloxacin and azithromycin in the past)  -Follow fever curve, WBCs     Acute hypoxic respiratory failure  -Currently requiring 2 L nasal cannula to maintain >90%  -Not documented how low he dropped but will continue to follow and wean O2 as tolerated  -Suspect secondary to above    Longstanding tobacco use with suspected COPD/emphysema  -Patient with emphysematous changes on CT PE, has been smoking since age 18  -Slightly diminished throughout the left lung fields without overt wheezing  -Patient will be continued on DuoNebs while here, as needed albuterol MDI  -Discussed obtaining PFTs after discharge and tobacco  cessation  -NRT offered while admitted    Atrial fibrillation with rapid ventricular response, resolved  -Patient with episode of RVR into the 180s while in the emergency department, resolved with 1 L LR and 2 g IV mag  -Was not symptomatic during this episode, had a normal troponin and BNP on presentation  -Suspect that this is likely secondary to his sepsis rather than an intrinsic process; however, his daughter mention that while he was asleep his heart rate did escalate back to the 160s following this but it was in the 90s and a sinus rhythm while I was examining the patient  -Out of abundance of caution, patient will be continued on telemetry.  Will have an echocardiogram obtained.  -He will be continued on his home aspirin, does take diltiazem daily so this will also be continued in the morning  -Will check electrolytes and TSH in the morning  -If recurrent, will need to likely involve cardiology for management recommendations    Essential hypertension  -Patient will be continued on home diltiazem in the morning, blood pressure is adequately controlled at this time  -Will continue to monitor overnight in setting of above    BPH with LUTS; hx bladder cancer s/p TURBT and mitomycin intravesical instillation (01/2023)  -Continued on home meds / flomax   -Continued outpatient follow-up with Urology (Dr. Ferrell) as scheduled     PUD   -Continue PPI while admitted    Cervical disc disease   -Continued outpatient follow-up as scheduled         Christine Connolly PA-C    Dragon dictation software was used to dictate this note and thus there may be minor errors in translation/transcription including garbled speech or misspellings. Please contact for clarification if needed.

## 2024-05-13 NOTE — PROGRESS NOTES
Cedric Mohan is a 79 y.o. male admitted for No Principal Problem: There is no principal problem currently on the Problem List. Please update the Problem List and refresh.. Pharmacy reviewed the patient's [id number] medications and allergies for accuracy.    The list below reflects the PTA list prior to pharmacy medication history. A summary a changes to the PTA medication list has been listed below. Please review each medication in order reconciliation for additional clarification and justification.    Source of information:  T2P    Medications added:    Medications modified:  Fluticasone 50mcg 1 spray every day --> 50mcg 1 spray every day prn    Medications to be removed:    Medications of concern:      Prior to Admission Medications   Prescriptions Last Dose Informant Patient Reported? Taking?   Arthritis Pain Relief, acetam, 650 mg ER tablet   Yes No   Sig: Take 2 tablets (1,300 mg) by mouth 2 times a day as needed.   aspirin 81 mg chewable tablet   Yes No   Sig: Chew 1 tablet (81 mg).   dilTIAZem CD (Cardizem CD) 240 mg 24 hr capsule   No No   Sig: Take 1 capsule (240 mg) by mouth once daily.   esomeprazole (NexIUM) 40 mg DR capsule   No No   Sig: Take 1 capsule (40 mg) by mouth once daily in the morning. Take before meals. Do not open capsule.   fluticasone (Flonase) 50 mcg/actuation nasal spray   No No   Sig: Administer 1 spray into each nostril once daily. Shake gently. Before first use, prime pump. After use, clean tip and replace cap.   multivitamin tablet   Yes No   Sig: Take 1 tablet by mouth once daily.   tamsulosin (Flomax) 0.4 mg 24 hr capsule   No No   Sig: Take 1 capsule (0.4 mg) by mouth once daily. Daily before bed      Facility-Administered Medications: None       Janette Pedroza

## 2024-05-13 NOTE — ED PROVIDER NOTES
HPI   Chief Complaint   Patient presents with    Shortness of Breath     X 3 weeks       This is a 79-year-old  male, with a past medical history of tobacco abuse, bladder CA, hypertension, and chronic back pain, presenting to the emergency room with complaints of shortness of breath.  The patient reports that it started 3 weeks ago with what he thought was a sinus infection.  The pain went down into his throat and into his ears.  Shortness of breath is gotten progressively worse.  He has felt feverish intermittently and was using Tylenol and Mucinex for congestion.  Patient reports that he has been eating and drinking normally.  He has had normal urine and bowel function.  Occasionally, when he coughs he has right lower rib pain.  He denies any abdominal pain.  Denies any hemoptysis, hematemesis, hematochezia, melena, or hematuria.  Patient denies any headache or syncope.  Denies any dizziness, palpitations, paresthesias, focal weakness.  He denies any sick contacts.  He does not have any history of PE, DVT, recent travel, hospitalization, or surgeries.  He denies any history of CAD or heart failure.      History provided by:  Patient   used: No                        Breonna Coma Scale Score: 15                     Patient History   Past Medical History:   Diagnosis Date    Other conditions influencing health status     Arthritis    Other conditions influencing health status     Bladder Cancer    Other conditions influencing health status     Prostate Cancer     Past Surgical History:   Procedure Laterality Date    HERNIA REPAIR  07/05/2013    Hernia Repair    HERNIA REPAIR  06/11/2013    Hernia Repair    OTHER SURGICAL HISTORY  05/23/2013    General Surgery    OTHER SURGICAL HISTORY  03/17/2017    Sialodochoplasty    OTHER SURGICAL HISTORY  06/11/2013    Tonsillectomy Over Age 13    OTHER SURGICAL HISTORY  06/11/2013    Cystoscopy With Resection Of Tumor     Family History   Problem  Relation Name Age of Onset    Breast cancer Mother      Other (stroke syndrome) Mother      Prostate cancer Brother       Social History     Tobacco Use    Smoking status: Every Day     Current packs/day: 1.00     Types: Cigarettes     Passive exposure: Current    Smokeless tobacco: Never   Substance Use Topics    Alcohol use: Never    Drug use: Never       Physical Exam   ED Triage Vitals [05/13/24 0958]   Temperature Heart Rate Respirations BP   (!) 38.5 °C (101.3 °F) (!) 125 (!) 28 151/74      Pulse Ox Temp src Heart Rate Source Patient Position   (!) 93 % -- Monitor Sitting      BP Location FiO2 (%)     Left arm --       Physical Exam  Vitals and nursing note reviewed.   Constitutional:       Appearance: Normal appearance. He is normal weight.   HENT:      Head: Normocephalic and atraumatic.      Right Ear: Tympanic membrane normal.      Left Ear: Tympanic membrane normal.      Nose: Nose normal.      Mouth/Throat:      Mouth: Mucous membranes are moist.      Pharynx: Oropharynx is clear.   Eyes:      Extraocular Movements: Extraocular movements intact.      Conjunctiva/sclera: Conjunctivae normal.      Pupils: Pupils are equal, round, and reactive to light.   Cardiovascular:      Rate and Rhythm: Normal rate and regular rhythm.      Pulses: Normal pulses.   Pulmonary:      Effort: Pulmonary effort is normal.      Breath sounds: Examination of the right-lower field reveals decreased breath sounds. Examination of the left-lower field reveals decreased breath sounds. Decreased breath sounds present.   Abdominal:      General: Abdomen is flat. Bowel sounds are normal.      Palpations: Abdomen is soft.   Genitourinary:     Comments: No CVA tenderness or pubic pain.  Musculoskeletal:         General: Normal range of motion.   Skin:     General: Skin is warm and dry.      Capillary Refill: Capillary refill takes less than 2 seconds.   Neurological:      General: No focal deficit present.      Mental Status: He is  alert and oriented to person, place, and time.   Psychiatric:         Mood and Affect: Mood normal.         Judgment: Judgment normal.         ED Course & MDM   ED Course as of 06/02/24 0805   Mon May 13, 2024   1037 ECG 12 lead  Twelve-lead EKG interpreted by me.  Sinus tachycardia at a rate of 124.  IN interval is 120, QRS is 118, QT is 329, QTc is 473.  Left axis deviation.  Low voltage in precordial leads.  No acute ischemia or injury pattern noted. [CT]   1127 Repeat EKG as interpreted by myself demonstrates atrial fibrillation with rapid ventricular response with a rate of 184, PVCs noted, normal QRS with prolonged QTc interval, no evidence of an acute STEMI. [NS]   1231 3rd EKG as interpreted by myself demonstrates sinus rhythm with a ventricular rate of 96, normal axis, normal intervals, no evidence of an acute STEMI or malignant arrhythmia. [NS]      ED Course User Index  [CT] HARITHA Parham-CNP  [NS] Earl Perez MD         Diagnoses as of 06/02/24 0805   Sepsis due to pneumonia (Multi)       Medical Decision Making  Patient was seen and evaluated with the attending physician, Dr. Perez.  The patient was placed on cardiac monitor and pulse oximetry.  A saline lock was established and laboratory studies were drawn with results as noted.  It was noted that the patient was febrile, tachycardic, and hypoxic.  A sepsis alert was initiated.  CBC showed patient had an elevated white count of 24.5 with left shift.  Sodium was 132 with a potassium 4.0.  Creatinine is 0.88.  Serial troponins were negative.  Lactate was 1.8.  His routine urinalysis did not show any signs of infection.  The patient was administered 1 L of normal saline and 1 L lactated Ringer's.  He was given vancomycin and Zosyn for antibiotic coverage.  He was feeling on the 75 mg of Tylenol and 2 g of IV magnesium.  A CT of the abdomen, chest, or pelvis was performed was evident the patient had a right middle lobe  infiltrate with no acute process noted in the abdomen.  IMS was consulted regarding management of the patient and the patient was admitted to the general medical floorunder .        Procedure  Procedures     HARITHA Parham-ELLE  06/02/24 0813

## 2024-05-14 ENCOUNTER — APPOINTMENT (OUTPATIENT)
Dept: CARDIOLOGY | Facility: HOSPITAL | Age: 79
DRG: 871 | End: 2024-05-14
Payer: MEDICARE

## 2024-05-14 LAB
ALBUMIN SERPL BCP-MCNC: 2.8 G/DL (ref 3.4–5)
ALP SERPL-CCNC: 134 U/L (ref 33–136)
ALT SERPL W P-5'-P-CCNC: 23 U/L (ref 10–52)
ANION GAP SERPL CALC-SCNC: 13 MMOL/L (ref 10–20)
AORTIC VALVE MEAN GRADIENT: 2 MMHG
AORTIC VALVE PEAK VELOCITY: 1.14 M/S
APTT PPP: 30 SECONDS (ref 27–38)
AST SERPL W P-5'-P-CCNC: 20 U/L (ref 9–39)
ATRIAL RATE: 178 BPM
AV PEAK GRADIENT: 5.2 MMHG
AVA (PEAK VEL): 2.36 CM2
AVA (VTI): 2.83 CM2
BACTERIA UR CULT: NO GROWTH
BASOPHILS # BLD AUTO: 0.08 X10*3/UL (ref 0–0.1)
BASOPHILS NFR BLD AUTO: 0.4 %
BILIRUB SERPL-MCNC: 0.6 MG/DL (ref 0–1.2)
BUN SERPL-MCNC: 10 MG/DL (ref 6–23)
CALCIUM SERPL-MCNC: 7.9 MG/DL (ref 8.6–10.3)
CHLORIDE SERPL-SCNC: 102 MMOL/L (ref 98–107)
CO2 SERPL-SCNC: 22 MMOL/L (ref 21–32)
CREAT SERPL-MCNC: 0.65 MG/DL (ref 0.5–1.3)
EGFRCR SERPLBLD CKD-EPI 2021: >90 ML/MIN/1.73M*2
EJECTION FRACTION APICAL 4 CHAMBER: 61.6
EOSINOPHIL # BLD AUTO: 0.21 X10*3/UL (ref 0–0.4)
EOSINOPHIL NFR BLD AUTO: 1 %
ERYTHROCYTE [DISTWIDTH] IN BLOOD BY AUTOMATED COUNT: 13 % (ref 11.5–14.5)
GLUCOSE BLD MANUAL STRIP-MCNC: 110 MG/DL (ref 74–99)
GLUCOSE BLD MANUAL STRIP-MCNC: 98 MG/DL (ref 74–99)
GLUCOSE SERPL-MCNC: 104 MG/DL (ref 74–99)
HCT VFR BLD AUTO: 36 % (ref 41–52)
HGB BLD-MCNC: 12.1 G/DL (ref 13.5–17.5)
IMM GRANULOCYTES # BLD AUTO: 0.16 X10*3/UL (ref 0–0.5)
IMM GRANULOCYTES NFR BLD AUTO: 0.8 % (ref 0–0.9)
LEFT ATRIUM VOLUME AREA LENGTH INDEX BSA: 16.4 ML/M2
LEFT VENTRICLE INTERNAL DIMENSION DIASTOLE: 3.6 CM (ref 3.5–6)
LEFT VENTRICULAR OUTFLOW TRACT DIAMETER: 2.1 CM
LEGIONELLA AG UR QL: NEGATIVE
LV EJECTION FRACTION BIPLANE: 68 %
LYMPHOCYTES # BLD AUTO: 4.66 X10*3/UL (ref 0.8–3)
LYMPHOCYTES NFR BLD AUTO: 22.8 %
MAGNESIUM SERPL-MCNC: 1.96 MG/DL (ref 1.6–2.4)
MCH RBC QN AUTO: 31.8 PG (ref 26–34)
MCHC RBC AUTO-ENTMCNC: 33.6 G/DL (ref 32–36)
MCV RBC AUTO: 95 FL (ref 80–100)
MITRAL VALVE E/A RATIO: 0.76
MITRAL VALVE E/E' RATIO: 8.39
MONOCYTES # BLD AUTO: 1.4 X10*3/UL (ref 0.05–0.8)
MONOCYTES NFR BLD AUTO: 6.9 %
NEUTROPHILS # BLD AUTO: 13.92 X10*3/UL (ref 1.6–5.5)
NEUTROPHILS NFR BLD AUTO: 68.1 %
NRBC BLD-RTO: 0 /100 WBCS (ref 0–0)
PLATELET # BLD AUTO: 578 X10*3/UL (ref 150–450)
POTASSIUM SERPL-SCNC: 3.8 MMOL/L (ref 3.5–5.3)
PROT SERPL-MCNC: 6 G/DL (ref 6.4–8.2)
Q ONSET: 216 MS
QRS COUNT: 29 BEATS
QRS DURATION: 70 MS
QT INTERVAL: 242 MS
QTC CALCULATION(BAZETT): 419 MS
QTC FREDERICIA: 349 MS
R AXIS: -23 DEGREES
RBC # BLD AUTO: 3.81 X10*6/UL (ref 4.5–5.9)
RIGHT VENTRICLE FREE WALL PEAK S': 9.46 CM/S
RIGHT VENTRICLE PEAK SYSTOLIC PRESSURE: 31.9 MMHG
S PNEUM AG UR QL: NEGATIVE
SODIUM SERPL-SCNC: 133 MMOL/L (ref 136–145)
T AXIS: 30 DEGREES
T OFFSET: 337 MS
TRICUSPID ANNULAR PLANE SYSTOLIC EXCURSION: 2.2 CM
TSH SERPL-ACNC: 1.12 MIU/L (ref 0.44–3.98)
VENTRICULAR RATE: 180 BPM
WBC # BLD AUTO: 20.4 X10*3/UL (ref 4.4–11.3)

## 2024-05-14 PROCEDURE — 93306 TTE W/DOPPLER COMPLETE: CPT | Performed by: INTERNAL MEDICINE

## 2024-05-14 PROCEDURE — 93010 ELECTROCARDIOGRAM REPORT: CPT | Performed by: INTERNAL MEDICINE

## 2024-05-14 PROCEDURE — 2060000001 HC INTERMEDIATE ICU ROOM DAILY

## 2024-05-14 PROCEDURE — 2500000004 HC RX 250 GENERAL PHARMACY W/ HCPCS (ALT 636 FOR OP/ED): Performed by: STUDENT IN AN ORGANIZED HEALTH CARE EDUCATION/TRAINING PROGRAM

## 2024-05-14 PROCEDURE — 93306 TTE W/DOPPLER COMPLETE: CPT

## 2024-05-14 PROCEDURE — 36415 COLL VENOUS BLD VENIPUNCTURE: CPT | Performed by: STUDENT IN AN ORGANIZED HEALTH CARE EDUCATION/TRAINING PROGRAM

## 2024-05-14 PROCEDURE — 83735 ASSAY OF MAGNESIUM: CPT | Performed by: STUDENT IN AN ORGANIZED HEALTH CARE EDUCATION/TRAINING PROGRAM

## 2024-05-14 PROCEDURE — 2500000005 HC RX 250 GENERAL PHARMACY W/O HCPCS: Performed by: INTERNAL MEDICINE

## 2024-05-14 PROCEDURE — 84443 ASSAY THYROID STIM HORMONE: CPT | Performed by: STUDENT IN AN ORGANIZED HEALTH CARE EDUCATION/TRAINING PROGRAM

## 2024-05-14 PROCEDURE — 80053 COMPREHEN METABOLIC PANEL: CPT | Performed by: STUDENT IN AN ORGANIZED HEALTH CARE EDUCATION/TRAINING PROGRAM

## 2024-05-14 PROCEDURE — 2500000005 HC RX 250 GENERAL PHARMACY W/O HCPCS: Performed by: STUDENT IN AN ORGANIZED HEALTH CARE EDUCATION/TRAINING PROGRAM

## 2024-05-14 PROCEDURE — 93005 ELECTROCARDIOGRAM TRACING: CPT

## 2024-05-14 PROCEDURE — 85730 THROMBOPLASTIN TIME PARTIAL: CPT | Performed by: INTERNAL MEDICINE

## 2024-05-14 PROCEDURE — 36415 COLL VENOUS BLD VENIPUNCTURE: CPT | Performed by: INTERNAL MEDICINE

## 2024-05-14 PROCEDURE — 2500000001 HC RX 250 WO HCPCS SELF ADMINISTERED DRUGS (ALT 637 FOR MEDICARE OP): Performed by: STUDENT IN AN ORGANIZED HEALTH CARE EDUCATION/TRAINING PROGRAM

## 2024-05-14 PROCEDURE — 2500000004 HC RX 250 GENERAL PHARMACY W/ HCPCS (ALT 636 FOR OP/ED): Performed by: INTERNAL MEDICINE

## 2024-05-14 PROCEDURE — 2500000006 HC RX 250 W HCPCS SELF ADMINISTERED DRUGS (ALT 637 FOR ALL PAYERS): Mod: MUE | Performed by: STUDENT IN AN ORGANIZED HEALTH CARE EDUCATION/TRAINING PROGRAM

## 2024-05-14 PROCEDURE — 85025 COMPLETE CBC W/AUTO DIFF WBC: CPT | Performed by: STUDENT IN AN ORGANIZED HEALTH CARE EDUCATION/TRAINING PROGRAM

## 2024-05-14 PROCEDURE — 99233 SBSQ HOSP IP/OBS HIGH 50: CPT | Performed by: INTERNAL MEDICINE

## 2024-05-14 PROCEDURE — 82947 ASSAY GLUCOSE BLOOD QUANT: CPT

## 2024-05-14 PROCEDURE — 82947 ASSAY GLUCOSE BLOOD QUANT: CPT | Mod: 91

## 2024-05-14 RX ORDER — HEPARIN SODIUM 10000 [USP'U]/100ML
0-4000 INJECTION, SOLUTION INTRAVENOUS CONTINUOUS
Status: DISPENSED | OUTPATIENT
Start: 2024-05-14 | End: 2024-05-15

## 2024-05-14 RX ORDER — METOPROLOL TARTRATE 1 MG/ML
5 INJECTION, SOLUTION INTRAVENOUS ONCE
Status: COMPLETED | OUTPATIENT
Start: 2024-05-14 | End: 2024-05-14

## 2024-05-14 RX ORDER — METOPROLOL TARTRATE 1 MG/ML
5 INJECTION, SOLUTION INTRAVENOUS ONCE
Status: DISCONTINUED | OUTPATIENT
Start: 2024-05-14 | End: 2024-05-17 | Stop reason: HOSPADM

## 2024-05-14 RX ADMIN — HEPARIN SODIUM 800 UNITS/HR: 10000 INJECTION, SOLUTION INTRAVENOUS at 21:36

## 2024-05-14 RX ADMIN — CEFTRIAXONE SODIUM 2 G: 2 INJECTION, SOLUTION INTRAVENOUS at 17:50

## 2024-05-14 RX ADMIN — Medication 3 L/MIN: at 20:00

## 2024-05-14 RX ADMIN — TAMSULOSIN HYDROCHLORIDE 0.4 MG: 0.4 CAPSULE ORAL at 08:37

## 2024-05-14 RX ADMIN — METOPROLOL TARTRATE 5 MG: 5 INJECTION INTRAVENOUS at 00:24

## 2024-05-14 RX ADMIN — PANTOPRAZOLE SODIUM 40 MG: 40 TABLET, DELAYED RELEASE ORAL at 08:36

## 2024-05-14 RX ADMIN — DOXYCYCLINE 100 MG: 100 INJECTION, POWDER, LYOPHILIZED, FOR SOLUTION INTRAVENOUS at 16:15

## 2024-05-14 RX ADMIN — DILTIAZEM HYDROCHLORIDE 240 MG: 240 CAPSULE, EXTENDED RELEASE ORAL at 08:37

## 2024-05-14 RX ADMIN — HUMAN ALBUMIN MICROSPHERES AND PERFLUTREN 0.5 ML: 10; .22 INJECTION, SOLUTION INTRAVENOUS at 11:23

## 2024-05-14 RX ADMIN — HEPARIN SODIUM 800 UNITS/HR: 10000 INJECTION, SOLUTION INTRAVENOUS at 21:35

## 2024-05-14 RX ADMIN — METOPROLOL TARTRATE 5 MG: 5 INJECTION INTRAVENOUS at 18:38

## 2024-05-14 RX ADMIN — Medication 3 L/MIN: at 08:00

## 2024-05-14 RX ADMIN — ASPIRIN 81 MG CHEWABLE TABLET 81 MG: 81 TABLET CHEWABLE at 08:37

## 2024-05-14 RX ADMIN — DOXYCYCLINE 100 MG: 100 INJECTION, POWDER, LYOPHILIZED, FOR SOLUTION INTRAVENOUS at 05:50

## 2024-05-14 ASSESSMENT — ENCOUNTER SYMPTOMS
AGITATION: 0
WEAKNESS: 0
FATIGUE: 1
FACIAL SWELLING: 0
APPETITE CHANGE: 1
DIARRHEA: 0
DYSURIA: 0
BACK PAIN: 0
JOINT SWELLING: 0
NERVOUS/ANXIOUS: 0
SHORTNESS OF BREATH: 1
DIAPHORESIS: 0
VOMITING: 0
CHILLS: 1
HALLUCINATIONS: 0
COUGH: 1
CHEST TIGHTNESS: 0
SORE THROAT: 0
PALPITATIONS: 0
SINUS PAIN: 1
CONFUSION: 0
NECK PAIN: 0
CONSTIPATION: 0
WHEEZING: 0
LIGHT-HEADEDNESS: 0
HEMATURIA: 0
CHOKING: 0
ABDOMINAL PAIN: 0
FEVER: 0
WOUND: 0
NAUSEA: 0

## 2024-05-14 ASSESSMENT — COGNITIVE AND FUNCTIONAL STATUS - GENERAL
MOBILITY SCORE: 24
DAILY ACTIVITIY SCORE: 24

## 2024-05-14 ASSESSMENT — PAIN SCALES - GENERAL
PAINLEVEL_OUTOF10: 0 - NO PAIN
PAINLEVEL_OUTOF10: 0 - NO PAIN

## 2024-05-14 ASSESSMENT — ACTIVITIES OF DAILY LIVING (ADL): LACK_OF_TRANSPORTATION: NO

## 2024-05-14 ASSESSMENT — PAIN - FUNCTIONAL ASSESSMENT
PAIN_FUNCTIONAL_ASSESSMENT: 0-10
PAIN_FUNCTIONAL_ASSESSMENT: 0-10

## 2024-05-14 NOTE — PROGRESS NOTES
Cedric Mohan is a 79 y.o. male on day 1 of admission presenting with Sepsis due to pneumonia (Multi).    Review of Systems   Constitutional:  Positive for appetite change, chills and fatigue. Negative for diaphoresis and fever.   HENT:  Positive for congestion and sinus pain. Negative for facial swelling, sneezing and sore throat.    Respiratory:  Positive for cough and shortness of breath. Negative for choking, chest tightness and wheezing.    Cardiovascular:  Negative for chest pain, palpitations and leg swelling.   Gastrointestinal:  Negative for abdominal pain, constipation, diarrhea, nausea and vomiting.   Genitourinary:  Negative for dysuria, hematuria and urgency.   Musculoskeletal:  Negative for back pain, gait problem, joint swelling and neck pain.   Skin:  Negative for rash and wound.   Neurological:  Negative for syncope, weakness and light-headedness.   Psychiatric/Behavioral:  Negative for agitation, confusion and hallucinations. The patient is not nervous/anxious.    All other systems reviewed and are negative.     Subjective   Cedric Mohan is a 79 y.o. male with PMHx s/f HTN, tobacco use disorder (no dx COPD, no PFTs), BPH with LUTS, bladder cancer s/p TURBT and mitomycin intravesical instillation (01/2023), presenting with shortness of breath, productive cough (clear sputum), congestion, decreased appetite, and general malaise progressively worsening x ~3 weeks.  Patient had been in his typical state of health until about 3 weeks ago when he noticed some sinus congestion, noting both his daughter whom he lives with and son-in-law had also had some sinus congestion around that time.  However, since then, he feels like the congestion has moved down into his chest.  He has had progressively worsening cough, and notes when he coughs he does have some right-sided rib pain.  He has felt very short of breath when getting up and moving around, but is also experienced it intermittently at rest.  He  has some chronic chills, but has not really noticed any particular fever, rigors, night sweats.  He believes he has been eating and drinking near his baseline, but his daughter at the bedside notes he has had decreased appetite and not really eating as much is normal over the last 3 weeks.  He denies chest pain/pressure, dizziness or lightheadedness, diaphoresis, orthopnea, lower extremity edema, nausea, vomiting, abdominal pain, diarrhea, changes in urination, headache, vision changes, focal and/or lateralizing sensory or motor deficits.  Note that while patient was in the emergency department, he had an episode of atrial fibrillation with rapid ventricular response; patient denies any prior history of A-fib or other arrhythmias.     ED Course (Summary):   Vitals on presentation: T101.3, /74, , RR 28, SpO2 93% RA (dropped down to <90% and is currently on 2 L nasal cannula at 94%)  Labs: CBC with WBC 24.5 and left shift, Hgb 14.3, platelets 708.  CMP with glucose 125, sodium 132, potassium 4.0, bicarb 20, BUN 15, serum creatinine 0.88.  Magnesium 1.69.  BNP 25.  High-sensitivity troponin 13.  Lactate 1.8.  UA: Trace leukocyte esterase, 21-50 WBCs.  Imaging: CT PE and CT abdomen pelvis with IV contrast is notable for right middle lobe infiltrate and probable dependent changes in the right lung base, no acute pathologies in the abdomen and pelvis; there is evidence of chronic emphysematous disease in the chest  Interventions: 1 L normal saline, 1 L LR, vancomycin/Zosyn, 975 mg Tylenol, 2 g IV magnesium    5/14: Patient seen.  Remains on 3 L O2 by NC.  Uses no oxygen at rest.  No prior history of COPD or asthma.  States he had 1 prior episode of pneumonia.  Still has a cough, thinks he has mucus but has been unable to mobilize.  Will order a flutter valve.  Patient is a lobar pneumonia, suspect pneumococcal but urinary antigens were negative.  Continue to treat empirically.  Reassess in AM.  Continue IV  antibiotics and wean O2 as able.       Objective     Last Recorded Vitals  /69 (BP Location: Right arm, Patient Position: Lying)   Pulse 87   Temp 36.9 °C (98.4 °F)   Resp 16   Wt 66.8 kg (147 lb 4.3 oz)   SpO2 92%   Intake/Output last 3 Shifts:  No intake or output data in the 24 hours ending 05/14/24 1841      Admission Weight  Weight: 66.2 kg (146 lb) (05/13/24 0958)    Daily Weight  05/14/24 : 66.8 kg (147 lb 4.3 oz)      Physical Exam  HENT:      Head: Normocephalic and atraumatic.      Nose: Nose normal. No congestion or rhinorrhea.      Mouth/Throat:      Mouth: Mucous membranes are dry.      Pharynx: Oropharynx is clear.   Eyes:      General: No scleral icterus.     Extraocular Movements: Extraocular movements intact.      Pupils: Pupils are equal, round, and reactive to light.   Cardiovascular:      Rate and Rhythm: Normal rate and regular rhythm.      Heart sounds: Normal heart sounds. No murmur heard.     No friction rub. No gallop.   Pulmonary:      Effort: Pulmonary effort is normal.      Breath sounds: Examination of the right-upper field reveals rhonchi. Examination of the right-middle field reveals rhonchi. Rhonchi present. No wheezing or rales.   Chest:      Chest wall: No tenderness.   Abdominal:      General: There is no distension.      Palpations: Abdomen is soft.      Tenderness: There is no abdominal tenderness. There is no guarding or rebound.   Musculoskeletal:         General: No swelling, tenderness or signs of injury. Normal range of motion.      Cervical back: Normal range of motion.   Skin:     General: Skin is warm and dry.      Coloration: Skin is not jaundiced.      Findings: No bruising, erythema or rash.      Comments: Seborrheic keratoses on the back predominantly   Neurological:      General: No focal deficit present.      Mental Status: He is oriented to person, place, and time.          Lab Results  Results for orders placed or performed during the hospital encounter  of 05/13/24 (from the past 24 hour(s))   POCT GLUCOSE   Result Value Ref Range    POCT Glucose 163 (H) 74 - 99 mg/dL   POCT GLUCOSE   Result Value Ref Range    POCT Glucose 92 74 - 99 mg/dL   Electrocardiogram, 12-lead PRN ACS symptoms   Result Value Ref Range    Ventricular Rate 180 BPM    Atrial Rate 178 BPM    QRS Duration 70 ms    QT Interval 242 ms    QTC Calculation(Bazett) 419 ms    R Axis -23 degrees    T Axis 30 degrees    QRS Count 29 beats    Q Onset 216 ms    T Offset 337 ms    QTC Fredericia 349 ms   POCT GLUCOSE   Result Value Ref Range    POCT Glucose 110 (H) 74 - 99 mg/dL   CBC and Auto Differential   Result Value Ref Range    WBC 20.4 (H) 4.4 - 11.3 x10*3/uL    nRBC 0.0 0.0 - 0.0 /100 WBCs    RBC 3.81 (L) 4.50 - 5.90 x10*6/uL    Hemoglobin 12.1 (L) 13.5 - 17.5 g/dL    Hematocrit 36.0 (L) 41.0 - 52.0 %    MCV 95 80 - 100 fL    MCH 31.8 26.0 - 34.0 pg    MCHC 33.6 32.0 - 36.0 g/dL    RDW 13.0 11.5 - 14.5 %    Platelets 578 (H) 150 - 450 x10*3/uL    Neutrophils % 68.1 40.0 - 80.0 %    Immature Granulocytes %, Automated 0.8 0.0 - 0.9 %    Lymphocytes % 22.8 13.0 - 44.0 %    Monocytes % 6.9 2.0 - 10.0 %    Eosinophils % 1.0 0.0 - 6.0 %    Basophils % 0.4 0.0 - 2.0 %    Neutrophils Absolute 13.92 (H) 1.60 - 5.50 x10*3/uL    Immature Granulocytes Absolute, Automated 0.16 0.00 - 0.50 x10*3/uL    Lymphocytes Absolute 4.66 (H) 0.80 - 3.00 x10*3/uL    Monocytes Absolute 1.40 (H) 0.05 - 0.80 x10*3/uL    Eosinophils Absolute 0.21 0.00 - 0.40 x10*3/uL    Basophils Absolute 0.08 0.00 - 0.10 x10*3/uL   Comprehensive Metabolic Panel   Result Value Ref Range    Glucose 104 (H) 74 - 99 mg/dL    Sodium 133 (L) 136 - 145 mmol/L    Potassium 3.8 3.5 - 5.3 mmol/L    Chloride 102 98 - 107 mmol/L    Bicarbonate 22 21 - 32 mmol/L    Anion Gap 13 10 - 20 mmol/L    Urea Nitrogen 10 6 - 23 mg/dL    Creatinine 0.65 0.50 - 1.30 mg/dL    eGFR >90 >60 mL/min/1.73m*2    Calcium 7.9 (L) 8.6 - 10.3 mg/dL    Albumin 2.8 (L) 3.4 - 5.0  g/dL    Alkaline Phosphatase 134 33 - 136 U/L    Total Protein 6.0 (L) 6.4 - 8.2 g/dL    AST 20 9 - 39 U/L    Bilirubin, Total 0.6 0.0 - 1.2 mg/dL    ALT 23 10 - 52 U/L   Magnesium   Result Value Ref Range    Magnesium 1.96 1.60 - 2.40 mg/dL   TSH with reflex to Free T4 if abnormal   Result Value Ref Range    Thyroid Stimulating Hormone 1.12 0.44 - 3.98 mIU/L   POCT GLUCOSE   Result Value Ref Range    POCT Glucose 98 74 - 99 mg/dL   Transthoracic Echo (TTE) Complete   Result Value Ref Range    LVOT diam 2.10 cm    LV Biplane EF 68 %    MV E/A ratio 0.76     MV avg E/e' ratio 8.39     Tricuspid annular plane systolic excursion 2.2 cm    AV mn grad 2.0 mmHg    LA vol index A/L 16.4 ml/m2    AV pk pastor 1.14 m/s    RV free wall pk S' 9.46 cm/s    RVSP 31.9 mmHg    LVIDd 3.60 cm    Aortic Valve Area by Continuity of VTI 2.83 cm2    Aortic Valve Area by Continuity of Peak Velocity 2.36 cm2    AV pk grad 5.2 mmHg    LV A4C EF 61.6         Image Results  Electrocardiogram, 12-lead PRN ACS symptoms  Supraventricular tachycardia  Nonspecific ST and T wave abnormality  Abnormal ECG  When compared with ECG of 13-MAY-2024 23:48, (unconfirmed)  Previous ECG has undetermined rhythm, needs review  ST now depressed in Anterolateral leads  T wave inversion no longer evident in Inferior leads  Nonspecific T wave abnormality now evident in Lateral leads  Confirmed by Lulu Good ([id number]) on 5/14/2024 4:59:41 PM  Transthoracic Echo (TTE) [place] [id number]       Phone [phone] Fax [phone]    TRANSTHORACIC ECHOCARDIOGRAM REPORT       Patient Name:      TOÑO Black Physician:    [id number] Jerrod Milner DO  Study Date:        5/14/2024             Ordering Provider:    [id number] LOREN LANDAVERDE  MRN/PID:            [id number]              Fellow:  Accession#:        [id number]          Nurse:                Jacinta Stone  Date of Birth/Age: 1945 / 79 years  Sonographer:          Barbara Franco RDCS  Gender:            M                     Additional Staff:  Height:            172.72 cm             Admit Date:           5/13/2024  Weight:            66.68 kg              Admission Status:     Inpatient -                                                                 Routine  BSA / BMI:         1.79 m2 / 22.35 kg/m2 Department Location:  [facility]                                                                 Lab  Blood Pressure: 133 /81 mmHg    Study Type:    TRANSTHORACIC ECHO (TTE) COMPLETE  Diagnosis/ICD: Unspecified atrial fibrillation-I48.91; Other forms of                 dyspnea-R06.09  Indication:    Dyspnea on Exertion  CPT Codes:     Echo Complete w Full Doppler-[id number]    Patient History:  Pertinent History: A-Fib and HTN.    Study Detail: The following Echo studies were performed: 2D, M-Mode, Doppler and                color flow. Technically challenging study due to poor acoustic                windows and body habitus. Optison used as a contrast agent for                endocardial border definition. Total contrast used for this                procedure was 2 mL via IV push.       PHYSICIAN INTERPRETATION:  Left Ventricle: Left ventricular systolic function is normal, with an estimated ejection fraction of 55-60%. There are no regional wall motion abnormalities. The left ventricular cavity size is normal. Spectral Doppler shows an impaired relaxation pattern of left ventricular diastolic filling.  Left Atrium: The left atrium is normal in size.  Right Ventricle: The right ventricle is normal in size. There is low normal right ventricular systolic function.  Right Atrium: The right atrium is normal in size.  Aortic Valve: The aortic valve is trileaflet. There is no evidence of aortic valve regurgitation.  The peak instantaneous gradient of the aortic valve is 5.2 mmHg. The mean gradient of the aortic valve is 2.0 mmHg.  Mitral Valve: The mitral valve is normal in structure. There is no evidence of mitral valve regurgitation.  Tricuspid Valve: The tricuspid valve is structurally normal. There is trace to mild tricuspid regurgitation.  Pulmonic Valve: The pulmonic valve is structurally normal. There is no indication of pulmonic valve regurgitation.  Pericardium: There is a trivial pericardial effusion. There is a pericardial fat pad present.  Aorta: The aortic root is normal.       CONCLUSIONS:   1. Left ventricular systolic function is normal with a 55-60% estimated ejection fraction.   2. Spectral Doppler shows an impaired relaxation pattern of left ventricular diastolic filling.   3. There is low normal right ventricular systolic function.    QUANTITATIVE DATA SUMMARY:  2D MEASUREMENTS:                           Normal Ranges:  Ao Root d:     3.60 cm   (2.0-3.7cm)  LAs:           2.70 cm   (2.7-4.0cm)  IVSd:          0.80 cm   (0.6-1.1cm)  LVPWd:         0.90 cm   (0.6-1.1cm)  LVIDd:         3.60 cm   (3.9-5.9cm)  LVIDs:         2.40 cm  LV Mass Index: 47.8 g/m2  LV % FS        33.3 %    LA VOLUME:                                Normal Ranges:  LA Vol A4C:        35.5 ml    (22+/-6mL/m2)  LA Vol A2C:        24.4 ml  LA Vol BP:         29.4 ml  LA Vol Index A4C:  19.8ml/m2  LA Vol Index A2C:  13.6 ml/m2  LA Vol Index BP:   16.4 ml/m2  LA Area A4C:       13.4 cm2  LA Area A2C:       11.1 cm2  LA Major Axis A4C: 4.3 cm  LA Major Axis A2C: 4.3 cm  LA Volume Index:   16.4 ml/m2    RA VOLUME BY A/L METHOD:                        Normal Ranges:  RA Area A4C: 12.0 cm2    AORTA MEASUREMENTS:                       Normal Ranges:  Ao Sinus, d: 3.60 cm (2.1-3.5cm)  Ao STJ, d:   2.66 cm (1.7-3.4cm)  Asc Ao, d:   3.00 cm (2.1-3.4cm)    LV SYSTOLIC FUNCTION BY 2D PLANIMETRY (MOD):                      Normal Ranges:  EF-A4C View:  61.6 % (>=55%)  EF-A2C View: 71.2 %  EF-Biplane:  68.2 %    LV DIASTOLIC FUNCTION:                         Normal Ranges:  MV Peak E:    0.71 m/s (0.7-1.2 m/s)  MV Peak A:    0.94 m/s (0.42-0.7 m/s)  E/A Ratio:    0.76     (1.0-2.2)  MV e'         0.08 m/s (>8.0)  MV lateral e' 0.09 m/s  MV medial e'  0.08 m/s  E/e' Ratio:   8.39     (<8.0)    AORTIC VALVE:                                    Normal Ranges:  AoV Vmax:                1.14 m/s (<=1.7m/s)  AoV Peak P.2 mmHg (<20mmHg)  AoV Mean P.0 mmHg (1.7-11.5mmHg)  LVOT Max Nick:            0.78 m/s (<=1.1m/s)  AoV VTI:                 19.80 cm (18-25cm)  LVOT VTI:                16.20 cm  LVOT Diameter:           2.10 cm  (1.8-2.4cm)  AoV Area, VTI:           2.83 cm2 (2.5-5.5cm2)  AoV Area,Vmax:           2.36 cm2 (2.5-4.5cm2)  AoV Dimensionless Index: 0.82       RIGHT VENTRICLE:  RV Basal 3.70 cm  RV Mid   3.00 cm  RV Major 7.3 cm  TAPSE:   21.9 mm  RV s'    0.09 m/s    TRICUSPID VALVE/RVSP:                              Normal Ranges:  Peak TR Velocity: 2.69 m/s  RV Syst Pressure: 31.9 mmHg (< 30mmHg)  IVC Diam:         0.90 cm       [id number] Jerrod Milner DO  Electronically signed on 2024 at 1:01:54 PM       ** Final **       Assessment/Plan     * Sepsis due to pneumonia (Multi)  Assessment & Plan  -SIRS criteria ()   -Source: Pulm (see imaging results in HPI and results section) and  (UA: Trace leukocyte esterase, 21-50 WBCs, remainder pending at this time)  -End-organ dysfunction: hypoxia, transient atrial fibrillation with RVR (new arrhythmia)  -Lactate 1.8  -BP is normotensive. Has gotten 2L IVF.   -Blood cultures x2. Urine cx. MRSA nares. Urine antigens. Sputum culture.   -Continue antibiotic coverage with Ceftriaxone 2g q24h and Doxycycline 100mg BID (reactions to ciprofloxacin and azithromycin in the past)  -Follow fever curve, WBCs   5/14: RML on CT scan.  Urinary antigens negative. Continue empiric treatment. Marked  leukocytosis.  Continue current antibiotics.  Suspect pneumococcal pneumonia based on presentation.    Acute hypoxic respiratory failure (Multi)  Assessment & Plan  -Currently requiring 2 L nasal cannula to maintain >90%  -Not documented how low he dropped but will continue to follow and wean O2 as tolerated  -Suspect secondary to above    Essential hypertension  Assessment & Plan  -Patient will be continued on home diltiazem in the morning, blood pressure is adequately controlled at this time  -Will continue to monitor overnight in setting of above  5/14: BP is a little bit low.  Monitor closely with medications for A-fib.    Acute cystitis without hematuria  Assessment & Plan  Continue antibiotics. Adjust as needed. Awaiting culture.    Atrial fibrillation with rapid ventricular response (Multi)  Assessment & Plan  -Patient with episode of RVR into the 180s while in the emergency department, resolved with 1 L LR and 2 g IV mag  -Was not symptomatic during this episode, had a normal troponin and BNP on presentation  -Suspect that this is likely secondary to his sepsis rather than an intrinsic process; however, his daughter mention that while he was asleep his heart rate did escalate back to the 160s following this but it was in the 90s and a sinus rhythm while I was examining the patient  -Out of abundance of caution, patient will be continued on telemetry.  Will have an echocardiogram obtained.  -He will be continued on his home aspirin, does take diltiazem daily so this will also be continued in the morning  -Will check electrolytes and TSH in the morning  -If recurrent, will need to likely involve cardiology for management recommendations  5/14: LVEF is 55 to 60%.  Was in NSR most of the day but just flipped back into A-fib.  Will give another dose of Lopressor.  Consider adding if blood pressure is adequate.  Place consult to cardiology.  Suspect secondary to his pneumonia. [id number] Score 3.  Will start him  on heparin.    Tobacco abuse  Assessment & Plan  Longstanding tobacco use with suspected COPD/emphysema  -Patient with emphysematous changes on CT PE, has been smoking since age 18  -Slightly diminished throughout the left lung fields without overt wheezing  -Patient will be continued on DuoNebs while here, as needed albuterol MDI  -Discussed obtaining PFTs after discharge and tobacco cessation  -NRT offered while admitted    Peptic reflux disease  Assessment & Plan  -Continue PPI while admitted    Enlarged prostate with lower urinary tract symptoms (LUTS)  Assessment & Plan  -Continued on home meds / flomax   -Continued outpatient follow-up with Urology (Dr. Ferrell) as scheduled     Cervical disc disorder  Assessment & Plan  -Continued outpatient follow-up as scheduled                   Js Orta MD

## 2024-05-14 NOTE — ASSESSMENT & PLAN NOTE
-Patient will be continued on home diltiazem in the morning, blood pressure is adequately controlled at this time  -Will continue to monitor overnight in setting of above  5/14: BP is a little bit low.  Monitor closely with medications for A-fib.

## 2024-05-14 NOTE — ASSESSMENT & PLAN NOTE
-SIRS criteria (4/4)   -Source: Pulm (see imaging results in HPI and results section) and  (UA: Trace leukocyte esterase, 21-50 WBCs, remainder pending at this time)  -End-organ dysfunction: hypoxia, transient atrial fibrillation with RVR (new arrhythmia)  -Lactate 1.8  -BP is normotensive. Has gotten 2L IVF.   -Blood cultures x2. Urine cx. MRSA nares. Urine antigens. Sputum culture.   -Continue antibiotic coverage with Ceftriaxone 2g q24h and Doxycycline 100mg BID (reactions to ciprofloxacin and azithromycin in the past)  -Follow fever curve, WBCs   5/14: RML on CT scan.  Urinary antigens negative. Continue empiric treatment. Marked leukocytosis.  Continue current antibiotics.  Suspect pneumococcal pneumonia based on presentation.  5/15: Seems a little better, WBC improved. Remains on 3L.  Weaned to 2 L today.  Continue antibiotic therapy.  5/16: Remains on 2 L.  WBC count still elevated.  Continue flutter valve.  Reassess in AM.  5/17: White blood cell count down to 15.9.  Remains on 2 L.  Weaned to room air, no longer requires O2 for discharge.  Follow-up chest x-ray in 3 to 4 weeks.  Follow-up PCP in 1 to 2 weeks.

## 2024-05-14 NOTE — ASSESSMENT & PLAN NOTE
-Currently requiring 2 L nasal cannula to maintain >90%  -Not documented how low he dropped but will continue to follow and wean O2 as tolerated  -Suspect secondary to above

## 2024-05-14 NOTE — PROGRESS NOTES
Occupational Therapy                 Therapy Communication Note    Patient Name: Cedric Mohan  MRN: [id number]  Today's Date: 5/14/2024     Discipline: Occupational Therapy     Missed Visit Reason: Other (Comment) Per RN pt. Indep. with ADLs and amb. No therapy needs at this time. Discharge O.T.

## 2024-05-14 NOTE — PROGRESS NOTES
05/14/24 1047   Discharge Planning   Living Arrangements Children;Family members   Support Systems Children;Family members   Assistance Needed Independent   Type of Residence Private residence   Home or Post Acute Services None   Patient expects to be discharged to: Home   Does the patient need discharge transport arranged? No   Financial Resource Strain   How hard is it for you to pay for the very basics like food, housing, medical care, and heating? Not hard   Housing Stability   In the last 12 months, was there a time when you were not able to pay the mortgage or rent on time? N   In the last 12 months, was there a time when you did not have a steady place to sleep or slept in a shelter (including now)? N   Transportation Needs   In the past 12 months, has lack of transportation kept you from medical appointments or from getting medications? no   In the past 12 months, has lack of transportation kept you from meetings, work, or from getting things needed for daily living? No     PCP is Mook Porter MD. Patient is from home with his daughter, his son in law, and his grandson. Patient is independent with ambulation, self care, driving, shopping, and meals.  Patient is on 2L oxygen, none at baseline, if unable to wean to room air, patient will need O2 evaluation prior to discharge. PT/OT Pending. Patient is not interested in HHC if recommended, he prefers to return home when medically ready. TCC to follow.

## 2024-05-14 NOTE — ASSESSMENT & PLAN NOTE
-Patient with episode of RVR into the 180s while in the emergency department, resolved with 1 L LR and 2 g IV mag  -Was not symptomatic during this episode, had a normal troponin and BNP on presentation  -Suspect that this is likely secondary to his sepsis rather than an intrinsic process; however, his daughter mention that while he was asleep his heart rate did escalate back to the 160s following this but it was in the 90s and a sinus rhythm while I was examining the patient  -Out of abundance of caution, patient will be continued on telemetry.  Will have an echocardiogram obtained.  -He will be continued on his home aspirin, does take diltiazem daily so this will also be continued in the morning  -Will check electrolytes and TSH in the morning  -If recurrent, will need to likely involve cardiology for management recommendations  5/14: LVEF is 55 to 60%.  Was in NSR most of the day but just flipped back into A-fib.  Will give another dose of Lopressor.  Consider adding if blood pressure is adequate.  Place consult to cardiology.  Suspect secondary to his pneumonia. [id number] Score 3.  Will start him on heparin.  5/15: HR very irregular last PM. Flipping between NSR, NSR with PACs, and what looks like afib. Often changes rapidly. On cardizem, has PRN beta blocker.  Seen by cardiology.  Will start amiodarone if magnesium levels appropriate.  Supplement otherwise.  Switching from heparin to apixaban.  5/16: Remains in NSR.  Did not require amiodarone.  Close follow-up with cardiology as outpatient.  Continue Cardizem and apixaban.  5/17: Remains in NSR.  No amiodarone is required.  Discharged on apixaban.  Precautions given to patient.  Follow-up cardiology as outpatient.

## 2024-05-14 NOTE — PROGRESS NOTES
Physical Therapy                 Therapy Communication Note    Patient Name: Cedric Mohan  MRN: [id number]  Today's Date: 5/14/2024     Discipline: Physical Therapy    Missed Visit Reason: Missed Visit Reason:  (PT STATES=INDEP IN ROOM, TAKING OFF O2 TO AMB, EDUCATED TO WEAR O2 AT ALL TIMES  UNTIL CLEARED ELENA RT/DR TO TAKE OFF, OBTAINED O2 EXTENSION SO HE CAN AMB W/ O2 ON, DENIES MOBILTY CONCERNS, RN CONFIRMS INDEP AMB IN ROOM, NO REHAB NEEDS, REORDER PRN)    Missed Time:     Comment: [id number] HRS, NO REHAB NEEDS, DC FROM P.T.

## 2024-05-14 NOTE — ASSESSMENT & PLAN NOTE
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

## 2024-05-14 NOTE — ASSESSMENT & PLAN NOTE
-Continued on home meds / flomax   -Continued outpatient follow-up with Urology (Dr. Ferrell) as scheduled

## 2024-05-14 NOTE — PROGRESS NOTES
Social work consult placed for discharge planning. SW reviewed pt's chart and communicated with TCC. No SW needs foreseen at this time. SW signing off; available upon request.    TATUM Hylton ([id number])   Care Transitions

## 2024-05-15 ENCOUNTER — TELEMEDICINE (OUTPATIENT)
Dept: PHARMACY | Facility: HOSPITAL | Age: 79
End: 2024-05-15
Payer: MEDICARE

## 2024-05-15 DIAGNOSIS — A41.9 SEPSIS DUE TO PNEUMONIA (MULTI): Primary | ICD-10-CM

## 2024-05-15 DIAGNOSIS — J18.9 SEPSIS DUE TO PNEUMONIA (MULTI): Primary | ICD-10-CM

## 2024-05-15 LAB
ANION GAP SERPL CALC-SCNC: 12 MMOL/L (ref 10–20)
BUN SERPL-MCNC: 14 MG/DL (ref 6–23)
CALCIUM SERPL-MCNC: 7.8 MG/DL (ref 8.6–10.3)
CHLORIDE SERPL-SCNC: 102 MMOL/L (ref 98–107)
CO2 SERPL-SCNC: 21 MMOL/L (ref 21–32)
CREAT SERPL-MCNC: 0.66 MG/DL (ref 0.5–1.3)
EGFRCR SERPLBLD CKD-EPI 2021: >90 ML/MIN/1.73M*2
ERYTHROCYTE [DISTWIDTH] IN BLOOD BY AUTOMATED COUNT: 13 % (ref 11.5–14.5)
GLUCOSE SERPL-MCNC: 97 MG/DL (ref 74–99)
HCT VFR BLD AUTO: 35.3 % (ref 41–52)
HGB BLD-MCNC: 11.9 G/DL (ref 13.5–17.5)
MAGNESIUM SERPL-MCNC: 1.98 MG/DL (ref 1.6–2.4)
MCH RBC QN AUTO: 32.2 PG (ref 26–34)
MCHC RBC AUTO-ENTMCNC: 33.7 G/DL (ref 32–36)
MCV RBC AUTO: 96 FL (ref 80–100)
NRBC BLD-RTO: 0 /100 WBCS (ref 0–0)
PLATELET # BLD AUTO: 566 X10*3/UL (ref 150–450)
POTASSIUM SERPL-SCNC: 3.5 MMOL/L (ref 3.5–5.3)
RBC # BLD AUTO: 3.69 X10*6/UL (ref 4.5–5.9)
SODIUM SERPL-SCNC: 131 MMOL/L (ref 136–145)
UFH PPP CHRO-ACNC: 0.1 IU/ML
UFH PPP CHRO-ACNC: 0.2 IU/ML
UFH PPP CHRO-ACNC: <0.1 IU/ML
UFH PPP CHRO-ACNC: <0.1 IU/ML
WBC # BLD AUTO: 17.2 X10*3/UL (ref 4.4–11.3)

## 2024-05-15 PROCEDURE — 36415 COLL VENOUS BLD VENIPUNCTURE: CPT | Performed by: INTERNAL MEDICINE

## 2024-05-15 PROCEDURE — 2500000004 HC RX 250 GENERAL PHARMACY W/ HCPCS (ALT 636 FOR OP/ED): Performed by: INTERNAL MEDICINE

## 2024-05-15 PROCEDURE — 99233 SBSQ HOSP IP/OBS HIGH 50: CPT | Performed by: INTERNAL MEDICINE

## 2024-05-15 PROCEDURE — 2500000005 HC RX 250 GENERAL PHARMACY W/O HCPCS: Performed by: STUDENT IN AN ORGANIZED HEALTH CARE EDUCATION/TRAINING PROGRAM

## 2024-05-15 PROCEDURE — 2060000001 HC INTERMEDIATE ICU ROOM DAILY

## 2024-05-15 PROCEDURE — 2500000004 HC RX 250 GENERAL PHARMACY W/ HCPCS (ALT 636 FOR OP/ED): Performed by: STUDENT IN AN ORGANIZED HEALTH CARE EDUCATION/TRAINING PROGRAM

## 2024-05-15 PROCEDURE — 2500000006 HC RX 250 W HCPCS SELF ADMINISTERED DRUGS (ALT 637 FOR ALL PAYERS): Performed by: STUDENT IN AN ORGANIZED HEALTH CARE EDUCATION/TRAINING PROGRAM

## 2024-05-15 PROCEDURE — 2500000001 HC RX 250 WO HCPCS SELF ADMINISTERED DRUGS (ALT 637 FOR MEDICARE OP): Performed by: INTERNAL MEDICINE

## 2024-05-15 PROCEDURE — 2500000001 HC RX 250 WO HCPCS SELF ADMINISTERED DRUGS (ALT 637 FOR MEDICARE OP): Performed by: STUDENT IN AN ORGANIZED HEALTH CARE EDUCATION/TRAINING PROGRAM

## 2024-05-15 PROCEDURE — 99223 1ST HOSP IP/OBS HIGH 75: CPT | Performed by: INTERNAL MEDICINE

## 2024-05-15 PROCEDURE — 85520 HEPARIN ASSAY: CPT | Mod: 91,MUE | Performed by: INTERNAL MEDICINE

## 2024-05-15 PROCEDURE — 80048 BASIC METABOLIC PNL TOTAL CA: CPT | Performed by: INTERNAL MEDICINE

## 2024-05-15 PROCEDURE — 2500000004 HC RX 250 GENERAL PHARMACY W/ HCPCS (ALT 636 FOR OP/ED): Mod: MUE | Performed by: INTERNAL MEDICINE

## 2024-05-15 PROCEDURE — 85027 COMPLETE CBC AUTOMATED: CPT | Performed by: INTERNAL MEDICINE

## 2024-05-15 PROCEDURE — 83735 ASSAY OF MAGNESIUM: CPT | Performed by: INTERNAL MEDICINE

## 2024-05-15 RX ADMIN — DOXYCYCLINE 100 MG: 100 INJECTION, POWDER, LYOPHILIZED, FOR SOLUTION INTRAVENOUS at 04:56

## 2024-05-15 RX ADMIN — DILTIAZEM HYDROCHLORIDE 240 MG: 240 CAPSULE, EXTENDED RELEASE ORAL at 08:37

## 2024-05-15 RX ADMIN — HEPARIN SODIUM 1200 UNITS/HR: 10000 INJECTION, SOLUTION INTRAVENOUS at 12:10

## 2024-05-15 RX ADMIN — ASPIRIN 81 MG CHEWABLE TABLET 81 MG: 81 TABLET CHEWABLE at 08:37

## 2024-05-15 RX ADMIN — HEPARIN SODIUM 1400 UNITS/HR: 10000 INJECTION, SOLUTION INTRAVENOUS at 18:45

## 2024-05-15 RX ADMIN — CEFTRIAXONE SODIUM 2 G: 2 INJECTION, SOLUTION INTRAVENOUS at 16:55

## 2024-05-15 RX ADMIN — ACETAMINOPHEN 650 MG: 325 TABLET ORAL at 15:41

## 2024-05-15 RX ADMIN — APIXABAN 5 MG: 5 TABLET, FILM COATED ORAL at 20:39

## 2024-05-15 RX ADMIN — Medication 3 L/MIN: at 08:37

## 2024-05-15 RX ADMIN — DOXYCYCLINE 100 MG: 100 INJECTION, POWDER, LYOPHILIZED, FOR SOLUTION INTRAVENOUS at 15:41

## 2024-05-15 RX ADMIN — TAMSULOSIN HYDROCHLORIDE 0.4 MG: 0.4 CAPSULE ORAL at 08:37

## 2024-05-15 RX ADMIN — HEPARIN SODIUM 1200 UNITS/HR: 10000 INJECTION, SOLUTION INTRAVENOUS at 07:40

## 2024-05-15 RX ADMIN — PANTOPRAZOLE SODIUM 40 MG: 40 TABLET, DELAYED RELEASE ORAL at 08:37

## 2024-05-15 SDOH — SOCIAL STABILITY: SOCIAL INSECURITY: HAVE YOU HAD THOUGHTS OF HARMING ANYONE ELSE?: NO

## 2024-05-15 SDOH — SOCIAL STABILITY: SOCIAL INSECURITY: DO YOU FEEL UNSAFE GOING BACK TO THE PLACE WHERE YOU ARE LIVING?: NO

## 2024-05-15 SDOH — SOCIAL STABILITY: SOCIAL INSECURITY: ABUSE: ADULT

## 2024-05-15 SDOH — SOCIAL STABILITY: SOCIAL INSECURITY: HAS ANYONE EVER THREATENED TO HURT YOUR FAMILY OR YOUR PETS?: NO

## 2024-05-15 SDOH — SOCIAL STABILITY: SOCIAL INSECURITY: HAVE YOU HAD ANY THOUGHTS OF HARMING ANYONE ELSE?: NO

## 2024-05-15 SDOH — SOCIAL STABILITY: SOCIAL INSECURITY: ARE YOU OR HAVE YOU BEEN THREATENED OR ABUSED PHYSICALLY, EMOTIONALLY, OR SEXUALLY BY ANYONE?: NO

## 2024-05-15 SDOH — SOCIAL STABILITY: SOCIAL INSECURITY: DO YOU FEEL ANYONE HAS EXPLOITED OR TAKEN ADVANTAGE OF YOU FINANCIALLY OR OF YOUR PERSONAL PROPERTY?: NO

## 2024-05-15 SDOH — SOCIAL STABILITY: SOCIAL INSECURITY: ARE THERE ANY APPARENT SIGNS OF INJURIES/BEHAVIORS THAT COULD BE RELATED TO ABUSE/NEGLECT?: NO

## 2024-05-15 SDOH — SOCIAL STABILITY: SOCIAL INSECURITY: WERE YOU ABLE TO COMPLETE ALL THE BEHAVIORAL HEALTH SCREENINGS?: YES

## 2024-05-15 SDOH — SOCIAL STABILITY: SOCIAL INSECURITY: DOES ANYONE TRY TO KEEP YOU FROM HAVING/CONTACTING OTHER FRIENDS OR DOING THINGS OUTSIDE YOUR HOME?: NO

## 2024-05-15 ASSESSMENT — ENCOUNTER SYMPTOMS
APPETITE CHANGE: 1
CONFUSION: 0
SINUS PAIN: 1
HEMATURIA: 0
CONSTIPATION: 0
SHORTNESS OF BREATH: 1
NECK PAIN: 0
CHILLS: 1
ABDOMINAL PAIN: 0
DIARRHEA: 0
VOMITING: 0
WHEEZING: 0
WOUND: 0
FACIAL SWELLING: 0
NAUSEA: 0
LIGHT-HEADEDNESS: 0
DIAPHORESIS: 0
BACK PAIN: 0
COUGH: 1
WEAKNESS: 0
PALPITATIONS: 0
NERVOUS/ANXIOUS: 0
HALLUCINATIONS: 0
CHOKING: 0
FEVER: 0
JOINT SWELLING: 0
FATIGUE: 1
CHEST TIGHTNESS: 0
DYSURIA: 0
SORE THROAT: 0
AGITATION: 0

## 2024-05-15 ASSESSMENT — COGNITIVE AND FUNCTIONAL STATUS - GENERAL
MOBILITY SCORE: 24
MOBILITY SCORE: 24
DAILY ACTIVITIY SCORE: 24
DAILY ACTIVITIY SCORE: 24
PATIENT BASELINE BEDBOUND: NO

## 2024-05-15 ASSESSMENT — LIFESTYLE VARIABLES
SUBSTANCE_ABUSE_PAST_12_MONTHS: NO
AUDIT-C TOTAL SCORE: 0
HOW OFTEN DO YOU HAVE A DRINK CONTAINING ALCOHOL: NEVER
SKIP TO QUESTIONS 9-10: 1
AUDIT-C TOTAL SCORE: 0
HOW OFTEN DO YOU HAVE 6 OR MORE DRINKS ON ONE OCCASION: NEVER
PRESCIPTION_ABUSE_PAST_12_MONTHS: NO
HOW MANY STANDARD DRINKS CONTAINING ALCOHOL DO YOU HAVE ON A TYPICAL DAY: PATIENT DOES NOT DRINK

## 2024-05-15 ASSESSMENT — PAIN - FUNCTIONAL ASSESSMENT
PAIN_FUNCTIONAL_ASSESSMENT: 0-10
PAIN_FUNCTIONAL_ASSESSMENT: 0-10

## 2024-05-15 ASSESSMENT — PAIN SCALES - GENERAL
PAINLEVEL_OUTOF10: 0 - NO PAIN
PAINLEVEL_OUTOF10: 0 - NO PAIN
PAINLEVEL_OUTOF10: 3

## 2024-05-15 ASSESSMENT — ACTIVITIES OF DAILY LIVING (ADL)
JUDGMENT_ADEQUATE_SAFELY_COMPLETE_DAILY_ACTIVITIES: YES
FEEDING YOURSELF: INDEPENDENT
GROOMING: INDEPENDENT
DRESSING YOURSELF: INDEPENDENT
PATIENT'S MEMORY ADEQUATE TO SAFELY COMPLETE DAILY ACTIVITIES?: YES
WALKS IN HOME: INDEPENDENT
HEARING - LEFT EAR: FUNCTIONAL
BATHING: INDEPENDENT
TOILETING: INDEPENDENT
LACK_OF_TRANSPORTATION: NO
ADEQUATE_TO_COMPLETE_ADL: YES
HEARING - RIGHT EAR: FUNCTIONAL

## 2024-05-15 ASSESSMENT — PATIENT HEALTH QUESTIONNAIRE - PHQ9
1. LITTLE INTEREST OR PLEASURE IN DOING THINGS: NOT AT ALL
2. FEELING DOWN, DEPRESSED OR HOPELESS: NOT AT ALL
SUM OF ALL RESPONSES TO PHQ9 QUESTIONS 1 & 2: 0

## 2024-05-15 ASSESSMENT — PAIN DESCRIPTION - LOCATION: LOCATION: HEAD

## 2024-05-15 NOTE — PROGRESS NOTES
Cedric Mohan is a 79 y.o. male on day 2 of admission presenting with Sepsis due to pneumonia (Multi).    Review of Systems   Constitutional:  Positive for appetite change, chills and fatigue. Negative for diaphoresis and fever.   HENT:  Positive for congestion and sinus pain. Negative for facial swelling, sneezing and sore throat.    Respiratory:  Positive for cough and shortness of breath. Negative for choking, chest tightness and wheezing.    Cardiovascular:  Negative for chest pain, palpitations and leg swelling.   Gastrointestinal:  Negative for abdominal pain, constipation, diarrhea, nausea and vomiting.   Genitourinary:  Negative for dysuria, hematuria and urgency.   Musculoskeletal:  Negative for back pain, gait problem, joint swelling and neck pain.   Skin:  Negative for rash and wound.   Neurological:  Negative for syncope, weakness and light-headedness.   Psychiatric/Behavioral:  Negative for agitation, confusion and hallucinations. The patient is not nervous/anxious.    All other systems reviewed and are negative.     Subjective   Cedric Mohan is a 79 y.o. male with PMHx s/f HTN, tobacco use disorder (no dx COPD, no PFTs), BPH with LUTS, bladder cancer s/p TURBT and mitomycin intravesical instillation (01/2023), presenting with shortness of breath, productive cough (clear sputum), congestion, decreased appetite, and general malaise progressively worsening x ~3 weeks.  Patient had been in his typical state of health until about 3 weeks ago when he noticed some sinus congestion, noting both his daughter whom he lives with and son-in-law had also had some sinus congestion around that time.  However, since then, he feels like the congestion has moved down into his chest.  He has had progressively worsening cough, and notes when he coughs he does have some right-sided rib pain.  He has felt very short of breath when getting up and moving around, but is also experienced it intermittently at rest.  He  has some chronic chills, but has not really noticed any particular fever, rigors, night sweats.  He believes he has been eating and drinking near his baseline, but his daughter at the bedside notes he has had decreased appetite and not really eating as much is normal over the last 3 weeks.  He denies chest pain/pressure, dizziness or lightheadedness, diaphoresis, orthopnea, lower extremity edema, nausea, vomiting, abdominal pain, diarrhea, changes in urination, headache, vision changes, focal and/or lateralizing sensory or motor deficits.  Note that while patient was in the emergency department, he had an episode of atrial fibrillation with rapid ventricular response; patient denies any prior history of A-fib or other arrhythmias.     ED Course (Summary):   Vitals on presentation: T101.3, /74, , RR 28, SpO2 93% RA (dropped down to <90% and is currently on 2 L nasal cannula at 94%)  Labs: CBC with WBC 24.5 and left shift, Hgb 14.3, platelets 708.  CMP with glucose 125, sodium 132, potassium 4.0, bicarb 20, BUN 15, serum creatinine 0.88.  Magnesium 1.69.  BNP 25.  High-sensitivity troponin 13.  Lactate 1.8.  UA: Trace leukocyte esterase, 21-50 WBCs.  Imaging: CT PE and CT abdomen pelvis with IV contrast is notable for right middle lobe infiltrate and probable dependent changes in the right lung base, no acute pathologies in the abdomen and pelvis; there is evidence of chronic emphysematous disease in the chest  Interventions: 1 L normal saline, 1 L LR, vancomycin/Zosyn, 975 mg Tylenol, 2 g IV magnesium    5/14: Patient seen.  Remains on 3 L O2 by NC.  Uses no oxygen at rest.  No prior history of COPD or asthma.  States he had 1 prior episode of pneumonia.  Still has a cough, thinks he has mucus but has been unable to mobilize.  Will order a flutter valve.  Patient is a lobar pneumonia, suspect pneumococcal but urinary antigens were negative.  Continue to treat empirically.  Reassess in AM.  Continue IV  antibiotics and wean O2 as able.    5/15: Patient seen.  Weaned to 2 L O2 by NC.  Developed episodes of atrial fibrillation yesterday.  Started on anticoagulation.  Consult placed to cardiology.  Checking magnesium, if okay will start amiodarone.  Switching to apixaban today.  Continue antibiotic therapy, patient appears to be improving.  Ordered a flutter valve.  Discussed with patient and family in the room.       Objective     Last Recorded Vitals  /69 (BP Location: Right arm, Patient Position: Lying)   Pulse 82   Temp 36.6 °C (97.9 °F) (Temporal)   Resp 16   Wt 67.1 kg (147 lb 14.9 oz)   SpO2 91%   Intake/Output last 3 Shifts:    Intake/Output Summary (Last 24 hours) at 5/15/2024 1818  Last data filed at 5/15/2024 1725  Gross per 24 hour   Intake 645 ml   Output --   Net 645 ml         Admission Weight  Weight: 66.2 kg (146 lb) (05/13/24 0958)    Daily Weight  05/15/24 : 67.1 kg (147 lb 14.9 oz)      Physical Exam  HENT:      Head: Normocephalic and atraumatic.      Nose: Nose normal. No congestion or rhinorrhea.      Mouth/Throat:      Mouth: Mucous membranes are dry.      Pharynx: Oropharynx is clear.   Eyes:      General: No scleral icterus.     Extraocular Movements: Extraocular movements intact.      Pupils: Pupils are equal, round, and reactive to light.   Cardiovascular:      Rate and Rhythm: Normal rate and regular rhythm.      Heart sounds: Normal heart sounds. No murmur heard.     No friction rub. No gallop.   Pulmonary:      Effort: Pulmonary effort is normal.      Breath sounds: Examination of the right-upper field reveals rhonchi. Examination of the right-middle field reveals rhonchi. Rhonchi present. No wheezing or rales.   Chest:      Chest wall: No tenderness.   Abdominal:      General: There is no distension.      Palpations: Abdomen is soft.      Tenderness: There is no abdominal tenderness. There is no guarding or rebound.   Musculoskeletal:         General: No swelling, tenderness  or signs of injury. Normal range of motion.      Cervical back: Normal range of motion.   Skin:     General: Skin is warm and dry.      Coloration: Skin is not jaundiced.      Findings: No bruising, erythema or rash.      Comments: Seborrheic keratoses on the back predominantly   Neurological:      General: No focal deficit present.      Mental Status: He is oriented to person, place, and time.          Lab Results  Results for orders placed or performed during the hospital encounter of 05/13/24 (from the past 24 hour(s))   aPTT - baseline   Result Value Ref Range    aPTT 30 27 - 38 seconds   Heparin Assay, UFH   Result Value Ref Range    Heparin Unfractionated <0.1 See Comment Below for Therapeutic Ranges IU/mL   CBC   Result Value Ref Range    WBC 17.2 (H) 4.4 - 11.3 x10*3/uL    nRBC 0.0 0.0 - 0.0 /100 WBCs    RBC 3.69 (L) 4.50 - 5.90 x10*6/uL    Hemoglobin 11.9 (L) 13.5 - 17.5 g/dL    Hematocrit 35.3 (L) 41.0 - 52.0 %    MCV 96 80 - 100 fL    MCH 32.2 26.0 - 34.0 pg    MCHC 33.7 32.0 - 36.0 g/dL    RDW 13.0 11.5 - 14.5 %    Platelets 566 (H) 150 - 450 x10*3/uL   Basic Metabolic Panel   Result Value Ref Range    Glucose 97 74 - 99 mg/dL    Sodium 131 (L) 136 - 145 mmol/L    Potassium 3.5 3.5 - 5.3 mmol/L    Chloride 102 98 - 107 mmol/L    Bicarbonate 21 21 - 32 mmol/L    Anion Gap 12 10 - 20 mmol/L    Urea Nitrogen 14 6 - 23 mg/dL    Creatinine 0.66 0.50 - 1.30 mg/dL    eGFR >90 >60 mL/min/1.73m*2    Calcium 7.8 (L) 8.6 - 10.3 mg/dL   Heparin Assay, UFH   Result Value Ref Range    Heparin Unfractionated <0.1 See Comment Below for Therapeutic Ranges IU/mL   Heparin Assay, UFH   Result Value Ref Range    Heparin Unfractionated 0.1 See Comment Below for Therapeutic Ranges IU/mL   Magnesium   Result Value Ref Range    Magnesium 1.98 1.60 - 2.40 mg/dL   Heparin Assay, UFH   Result Value Ref Range    Heparin Unfractionated 0.2 See Comment Below for Therapeutic Ranges IU/mL        Image Results  Electrocardiogram,  12-lead PRN ACS symptoms  Supraventricular tachycardia  Nonspecific ST and T wave abnormality  Abnormal ECG  When compared with ECG of 13-MAY-2024 23:48, (unconfirmed)  Previous ECG has undetermined rhythm, needs review  ST now depressed in Anterolateral leads  T wave inversion no longer evident in Inferior leads  Nonspecific T wave abnormality now evident in Lateral leads  Confirmed by Lulu Good ([id number]) on 5/14/2024 4:59:41 PM  Transthoracic Echo (TTE) Complete                [address] [address]       Phone [phone] Fax [phone]    TRANSTHORACIC ECHOCARDIOGRAM REPORT       Patient Name:      TOÑO CLINTON FELIBERTO      Reading Physician:    [id number] Jerrod Milner DO  Study Date:        5/14/2024             Ordering Provider:    [id number] LOREN LANDAVERDE  MRN/PID:           [id number]              Fellow:  Accession#:        [id number]          Nurse:                Jacinta Stone  Date of Birth/Age: 1945 / 79 years  Sonographer:          Barbara Franco [facility]  Gender:            M                     Additional Staff:  Height:            172.72 cm             Admit Date:           5/13/2024  Weight:            66.68 kg              Admission Status:     Inpatient -                                                                 Routine  BSA / BMI:         1.79 m2 / 22.35 kg/m2 Department Location:  [facility] Echo                                                                 Lab  Blood Pressure: 133 /81 mmHg    Study Type:    TRANSTHORACIC ECHO (TTE) COMPLETE  Diagnosis/ICD: Unspecified atrial fibrillation-I48.91; Other forms of                 dyspnea-R06.09  Indication:    Dyspnea on Exertion  CPT Codes:     Echo Complete w Full [id number]    Patient History:  Pertinent History: A-Fib and HTN.    Study Detail: The following Echo  studies were performed: 2D, M-Mode, Doppler and                color flow. Technically challenging study due to poor acoustic                windows and body habitus. Optison used as a contrast agent for                endocardial border definition. Total contrast used for this                procedure was 2 mL via IV push.       PHYSICIAN INTERPRETATION:  Left Ventricle: Left ventricular systolic function is normal, with an estimated ejection fraction of 55-60%. There are no regional wall motion abnormalities. The left ventricular cavity size is normal. Spectral Doppler shows an impaired relaxation pattern of left ventricular diastolic filling.  Left Atrium: The left atrium is normal in size.  Right Ventricle: The right ventricle is normal in size. There is low normal right ventricular systolic function.  Right Atrium: The right atrium is normal in size.  Aortic Valve: The aortic valve is trileaflet. There is no evidence of aortic valve regurgitation. The peak instantaneous gradient of the aortic valve is 5.2 mmHg. The mean gradient of the aortic valve is 2.0 mmHg.  Mitral Valve: The mitral valve is normal in structure. There is no evidence of mitral valve regurgitation.  Tricuspid Valve: The tricuspid valve is structurally normal. There is trace to mild tricuspid regurgitation.  Pulmonic Valve: The pulmonic valve is structurally normal. There is no indication of pulmonic valve regurgitation.  Pericardium: There is a trivial pericardial effusion. There is a pericardial fat pad present.  Aorta: The aortic root is normal.       CONCLUSIONS:   1. Left ventricular systolic function is normal with a 55-60% estimated ejection fraction.   2. Spectral Doppler shows an impaired relaxation pattern of left ventricular diastolic filling.   3. There is low normal right ventricular systolic function.    QUANTITATIVE DATA SUMMARY:  2D MEASUREMENTS:                           Normal Ranges:  Ao Root d:     3.60 cm    (2.0-3.7cm)  LAs:           2.70 cm   (2.7-4.0cm)  IVSd:          0.80 cm   (0.6-1.1cm)  LVPWd:         0.90 cm   (0.6-1.1cm)  LVIDd:         3.60 cm   (3.9-5.9cm)  LVIDs:         2.40 cm  LV Mass Index: 47.8 g/m2  LV % FS        33.3 %    LA VOLUME:                                Normal Ranges:  LA Vol A4C:        35.5 ml    (22+/-6mL/m2)  LA Vol A2C:        24.4 ml  LA Vol BP:         29.4 ml  LA Vol Index A4C:  19.8ml/m2  LA Vol Index A2C:  13.6 ml/m2  LA Vol Index BP:   16.4 ml/m2  LA Area A4C:       13.4 cm2  LA Area A2C:       11.1 cm2  LA Major Axis A4C: 4.3 cm  LA Major Axis A2C: 4.3 cm  LA Volume Index:   16.4 ml/m2    RA VOLUME BY A/L METHOD:                        Normal Ranges:  RA Area A4C: 12.0 cm2    AORTA MEASUREMENTS:                       Normal Ranges:  Ao Sinus, d: 3.60 cm (2.1-3.5cm)  Ao STJ, d:   2.66 cm (1.7-3.4cm)  Asc Ao, d:   3.00 cm (2.1-3.4cm)    LV SYSTOLIC FUNCTION BY 2D PLANIMETRY (MOD):                      Normal Ranges:  EF-A4C View: 61.6 % (>=55%)  EF-A2C View: 71.2 %  EF-Biplane:  68.2 %    LV DIASTOLIC FUNCTION:                         Normal Ranges:  MV Peak E:    0.71 m/s (0.7-1.2 m/s)  MV Peak A:    0.94 m/s (0.42-0.7 m/s)  E/A Ratio:    0.76     (1.0-2.2)  MV e'         0.08 m/s (>8.0)  MV lateral e' 0.09 m/s  MV medial e'  0.08 m/s  E/e' Ratio:   8.39     (<8.0)    AORTIC VALVE:                                    Normal Ranges:  AoV Vmax:                1.14 m/s (<=1.7m/s)  AoV Peak P.2 mmHg (<20mmHg)  AoV Mean P.0 mmHg (1.7-11.5mmHg)  LVOT Max Nick:            0.78 m/s (<=1.1m/s)  AoV VTI:                 19.80 cm (18-25cm)  LVOT VTI:                16.20 cm  LVOT Diameter:           2.10 cm  (1.8-2.4cm)  AoV Area, VTI:           2.83 cm2 (2.5-5.5cm2)  AoV Area,Vmax:           2.36 cm2 (2.5-4.5cm2)  AoV Dimensionless Index: 0.82       RIGHT VENTRICLE:  RV Basal 3.70 cm  RV Mid   3.00 cm  RV Major 7.3 cm  TAPSE:   21.9 mm  RV s'    0.09  m/s    TRICUSPID VALVE/RVSP:                              Normal Ranges:  Peak TR Velocity: 2.69 m/s  RV Syst Pressure: 31.9 mmHg (< 30mmHg)  IVC Diam:         0.90 cm       [id number] Jerrod Milner   Electronically signed on 5/14/2024 at 1:01:54 PM       ** Final **       Assessment/Plan     * Sepsis due to pneumonia (Multi)  Assessment & Plan  -SIRS criteria (4/4)   -Source: Pulm (see imaging results in HPI and results section) and  (UA: Trace leukocyte esterase, 21-50 WBCs, remainder pending at this time)  -End-organ dysfunction: hypoxia, transient atrial fibrillation with RVR (new arrhythmia)  -Lactate 1.8  -BP is normotensive. Has gotten 2L IVF.   -Blood cultures x2. Urine cx. MRSA nares. Urine antigens. Sputum culture.   -Continue antibiotic coverage with Ceftriaxone 2g q24h and Doxycycline 100mg BID (reactions to ciprofloxacin and azithromycin in the past)  -Follow fever curve, WBCs   5/14: RML on CT scan.  Urinary antigens negative. Continue empiric treatment. Marked leukocytosis.  Continue current antibiotics.  Suspect pneumococcal pneumonia based on presentation.  5/15: Seems a little better, WBC improved. Remains on 3L.  Weaned to 2 L today.  Continue antibiotic therapy.    Acute hypoxic respiratory failure (Multi)  Assessment & Plan  -Currently requiring 2 L nasal cannula to maintain >90%  -Not documented how low he dropped but will continue to follow and wean O2 as tolerated  -Suspect secondary to above    Essential hypertension  Assessment & Plan  -Patient will be continued on home diltiazem in the morning, blood pressure is adequately controlled at this time  -Will continue to monitor overnight in setting of above  5/14: BP is a little bit low.  Monitor closely with medications for A-fib.    Acute cystitis without hematuria  Assessment & Plan  Continue antibiotics. Adjust as needed. Awaiting culture.    Atrial fibrillation with rapid ventricular response (Multi)  Assessment & Plan  -Patient with episode  of RVR into the 180s while in the emergency department, resolved with 1 L LR and 2 g IV mag  -Was not symptomatic during this episode, had a normal troponin and BNP on presentation  -Suspect that this is likely secondary to his sepsis rather than an intrinsic process; however, his daughter mention that while he was asleep his heart rate did escalate back to the 160s following this but it was in the 90s and a sinus rhythm while I was examining the patient  -Out of abundance of caution, patient will be continued on telemetry.  Will have an echocardiogram obtained.  -He will be continued on his home aspirin, does take diltiazem daily so this will also be continued in the morning  -Will check electrolytes and TSH in the morning  -If recurrent, will need to likely involve cardiology for management recommendations  5/14: LVEF is 55 to 60%.  Was in NSR most of the day but just flipped back into A-fib.  Will give another dose of Lopressor.  Consider adding if blood pressure is adequate.  Place consult to cardiology.  Suspect secondary to his pneumonia. [id number] Score 3.  Will start him on heparin.  5/15: HR very irregular last PM. Flipping between NSR, NSR with PACs, and what looks like afib. Often changes rapidly. On cardizem, has PRN beta blocker.  Seen by cardiology.  Will start amiodarone if magnesium levels appropriate.  Supplement otherwise.  Switching from heparin to apixaban.    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 Ferrell) as scheduled     Cervical disc disorder  Assessment & Plan  -Continued outpatient follow-up as scheduled                   Js Orta MD

## 2024-05-15 NOTE — CARE PLAN
The patient's goals for the shift include      The clinical goals for the shift include no signs of bleeding      Problem: Pain  Goal: My pain/discomfort is manageable  Outcome: Progressing     Problem: Safety  Goal: Patient will be injury free during hospitalization  Outcome: Progressing  Goal: I will remain free of falls  Outcome: Progressing     Problem: Daily Care  Goal: Daily care needs are met  Outcome: Progressing     Problem: Psychosocial Needs  Goal: Demonstrates ability to cope with hospitalization/illness  Outcome: Progressing  Goal: Collaborate with me, my family, and caregiver to identify my specific goals  Outcome: Progressing     Problem: Discharge Barriers  Goal: My discharge needs are met  Outcome: Progressing     Problem: Fall/Injury  Goal: Not fall by end of shift  Outcome: Progressing  Goal: Be free from injury by end of the shift  Outcome: Progressing  Goal: Verbalize understanding of personal risk factors for fall in the hospital  Outcome: Progressing  Goal: Verbalize understanding of risk factor reduction measures to prevent injury from fall in the home  Outcome: Progressing  Goal: Use assistive devices by end of the shift  Outcome: Progressing  Goal: Pace activities to prevent fatigue by end of the shift  Outcome: Progressing     Problem: Pain - Adult  Goal: Verbalizes/displays adequate comfort level or baseline comfort level  Outcome: Progressing     Problem: Safety - Adult  Goal: Free from fall injury  Outcome: Progressing     Problem: Discharge Planning  Goal: Discharge to home or other facility with appropriate resources  Outcome: Progressing     Problem: Chronic Conditions and Co-morbidities  Goal: Patient's chronic conditions and co-morbidity symptoms are monitored and maintained or improved  Outcome: Progressing

## 2024-05-15 NOTE — CARE PLAN
The patient's goals for the shift include    Problem: Safety  Goal: Patient will be injury free during hospitalization  Outcome: Progressing     Problem: Safety  Goal: I will remain free of falls  Outcome: Progressing       The clinical goals for the shift include   therapeutic heparin assay, no falls.    Over the shift, the patient did make progress towards goals.  All needs met, patient safety maintained.

## 2024-05-15 NOTE — CONSULTS
Inpatient consult to Cardiology  Consult performed by: Lulu Good MD  Consult ordered by: Js Orta MD  Reason for consult: atrial fibrillation        History Of Present Illness:    Cedric Mohan is a 79 y.o. male presenting with dyspnea.  H/o HTN, tobacco use disorder (no dx COPD, no PFTs), BPH with LUTS, bladder cancer s/p TURBT and mitomycin intravesical instillation (01/2023), presenting with shortness of breath, productive cough (clear sputum), congestion, decreased appetite, and general malaise progressively worsening x ~3 weeks.      In ED he was febrile, , Bpo 151/74.  Labs signif for leukocytosis with left shift, WBC 24.5k.  Mg 1.69, BNP 25, HSTI 13, lactate 1.8, UA trace LE, 21-50 WBC's.  CT PE and CT a/p with IV contrast notable for right middle lobe infiltrate concerning for PNA, and also emphysematous changes.  In ED he rec'd 1L NS, 1L LR, vanc/zosyn, tylenol, and IV Mg.    While patient was in the ED he had an episode of atrial fibrillation with RVR - new diagnosis of Afib for the patient.  Was in paroxysmal Afib with RVR yesterday, today maintaining SR mostly, with brief episode of pAfib 's around noon, converting to SR.    ROS:  The remainder of the review of systems was obtained, as was negative as pertains to the chief complaint.    Fhx:  no premature CAD  SocHx:  1ppd tob use > cut down to 4 cigs/day prior to admission, denies EtOH/illicits    TTE 5/15/24:  CONCLUSIONS:   1. Left ventricular systolic function is normal with a 55-60% estimated ejection fraction.   2. Spectral Doppler shows an impaired relaxation pattern of left ventricular diastolic filling.   3. There is low normal right ventricular systolic function.  Trivial pericardial effusion     Last Recorded Vitals:  Vitals:    05/15/24 0416 05/15/24 0725 05/15/24 1121 05/15/24 1511   BP: 116/74 128/76 107/71 111/69   BP Location: Right arm Right arm Right arm Right arm   Patient Position: Lying Lying Lying Lying  "  Pulse: 88 88 90 82   Resp: 16 16 16 16   Temp: 36.6 °C (97.8 °F) 36.7 °C (98 °F) 36.8 °C (98.2 °F) 36.6 °C (97.9 °F)   TempSrc: Temporal Temporal Temporal Temporal   SpO2: 92% 91% 92% 91%   Weight: 67.1 kg (147 lb 14.9 oz)      Height:           Last Labs:  CBC - 5/15/2024:  6:18 AM  17.2 11.9 566    35.3      CMP - 5/15/2024:  6:18 AM  7.8 6.0 20 --- 0.6   _ 2.8 23 134      PTT - 5/14/2024:  8:14 PM  _   _ 30     Troponin I, High Sensitivity   Date/Time Value Ref Range Status   05/13/2024 10:21 AM 13 0 - 20 ng/L Final     BNP   Date/Time Value Ref Range Status   05/13/2024 10:21 AM 25 0 - 99 pg/mL Final     LDL Calculated   Date/Time Value Ref Range Status   10/03/2023 11:54  (L) 140 - 190 mg/dL Final     Comment:                                 Near   Borderline      AGE      Desirable  Optimal    High     High     Very High     0-19 Y     0 - 109     ---    110-129   >/= 130     ----    20-24 Y     0 - 119     ---    120-159   >/= 160     ----      >24 Y     0 -  99   100-129  130-159   160-189     >/=190       VLDL   Date/Time Value Ref Range Status   10/03/2023 11:54 AM 32 0 - 40 mg/dL Final   10/10/2022 12:34 PM 31 0 - 40 mg/dL Final   08/28/2019 10:03 AM 26 0 - 40 mg/dL Final      Last I/O:  No intake/output data recorded.    Past Cardiology Tests (Last 3 Years):  EKG:  Electrocardiogram, 12-lead PRN ACS symptoms 05/14/2024      ECG 12 lead 05/13/2024 (Preliminary)      ECG 12 lead 05/13/2024 (Preliminary)      ECG 12 lead 05/13/2024 (Preliminary)    Echo:  Transthoracic Echo (TTE) Complete 05/14/2024    Ejection Fractions:  No results found for: \"EF\"  Cath:  No results found for this or any previous visit from the past 1095 days.    Stress Test:  No results found for this or any previous visit from the past 1095 days.    Cardiac Imaging:  No results found for this or any previous visit from the past 1095 days.      Past Medical History:  He has a past medical history of Other conditions influencing " health status, Other conditions influencing health status, and Other conditions influencing health status.    Past Surgical History:  He has a past surgical history that includes Other surgical history (05/23/2013); Hernia repair (07/05/2013); Other surgical history (03/17/2017); Hernia repair (06/11/2013); Other surgical history (06/11/2013); and Other surgical history (06/11/2013).      Social History:  He reports that he has been smoking cigarettes. He has been exposed to tobacco smoke. He has never used smokeless tobacco. He reports that he does not drink alcohol and does not use drugs.    Family History:  Family History   Problem Relation Name Age of Onset    Breast cancer Mother      Other (stroke syndrome) Mother      Prostate cancer Brother          Allergies:  Ciprofloxacin, Droxy, Alfuzosin, Azithromycin, and Tramadol    Inpatient Medications:  Scheduled medications   Medication Dose Route Frequency    aspirin  81 mg oral Daily    cefTRIAXone  2 g intravenous q24h    dilTIAZem CD  240 mg oral Daily    doxycycline  100 mg intravenous q12h    metoprolol  5 mg intravenous Once    oxygen   inhalation Continuous - Inhalation    pantoprazole  40 mg oral Daily before breakfast    Or    pantoprazole  40 mg intravenous Daily before breakfast    polyethylene glycol  17 g oral Daily    tamsulosin  0.4 mg oral Daily     PRN medications   Medication    acetaminophen    albuterol    bisacodyl    bisacodyl    fluticasone    guaiFENesin    heparin    ipratropium-albuteroL    melatonin    ondansetron    Or    ondansetron     Continuous Medications   Medication Dose Last Rate    heparin  0-4,000 Units/hr 1,300 Units/hr (05/15/24 1444)     Outpatient Medications:  Current Outpatient Medications   Medication Instructions    Arthritis Pain Relief (acetam) 1,300 mg, oral, 2 times daily PRN    aspirin 81 mg, oral, Daily    dilTIAZem CD (CARDIZEM CD) 240 mg, oral, Daily    esomeprazole (NEXIUM) 40 mg, oral, Daily before  breakfast, Do not open capsule.    fluticasone (Flonase) 50 mcg/actuation nasal spray 1 spray, Each Nostril, Daily, Shake gently. Before first use, prime pump. After use, clean tip and replace cap.    multivitamin tablet 1 tablet, oral, Daily    tamsulosin (FLOMAX) 0.4 mg, oral, Daily, Daily before bed       Physical Exam:  Physical Exam  HENT:      Head: Normocephalic.      Mouth/Throat:      Mouth: Mucous membranes are moist.   Cardiovascular:      Rate and Rhythm: Normal rate.   Pulmonary:      Breath sounds: Rhonchi present.      Comments: + cough  Abdominal:      Palpations: Abdomen is soft.   Musculoskeletal:      Cervical back: Neck supple.   Skin:     General: Skin is warm.   Neurological:      General: No focal deficit present.      Mental Status: He is alert and oriented to person, place, and time.   Psychiatric:         Mood and Affect: Mood normal.              Assessment/Plan   Atrial fibrillation:  new diagnosis of paroxysmal Afib with RVR in setting of sepsis from PNA, hypomagnesemia.  One episode of 's this afternoon.  Currently maintaining SR HR 80-90's.    TTE reviewed, normal LV fcn, no significant VHD  -continue rate control with cardizem CD 240mg PO daily  -check Mg - if WNL, to help maintain sinus rhythm, can start amiodarone 400mg BID x 7days, then 400mg daily x 7 days, then 200mg daily with total course of 30 days  -replete K/Mg  -currently on IV heparin > [id number] is 3 - patient would benefit from OAC with eliquis for CVA prophylaxis - start apixaban 5mg bid  -recommend outpt cardio follow up and holter after 30 days amiodarone is completed    PNA:    -Abx per primary service    HTN:  -serial monitoring on home meds    Hypomagnesemia:  -replete Mg as above    Peripheral IV 05/13/24 20 G Right Antecubital (Active)   Site Assessment Clean;Dry;Intact 05/15/24 0837   Dressing Status Clean;Dry 05/15/24 0837   Number of days: 2       Peripheral IV 05/13/24 20 G Left;Anterior Forearm  (Active)   Site Assessment Clean;Dry;Intact 05/15/24 0837   Dressing Status Clean;Dry 05/15/24 0837   Number of days: 2       Peripheral IV 05/15/24 18 G Right Forearm (Active)   Site Assessment Clean;Dry;Intact 05/15/24 0837   Dressing Status Clean;Dry 05/15/24 0837   Number of days: 0       Code Status:  Full Code      Lulu Good MD

## 2024-05-15 NOTE — PROGRESS NOTES
Cedric Mohan was referred to the Clinical Pharmacy Team to complete a virtual Transitions of Care encounter for discharge medication optimization. The patient was referred for their [disease state(s)]. The primary goal of this encounter is to ensure the patient's medications are both clinically appropriate and financially feasible for the patient. Pharmacy clinical interventions were provided as noted below.     Attending: Js Orta  _______________________________________________________________________  PHARMACY ASSESSMENT    Meds to beds? [yes]   Home Pharmacy:   Allergies Reviewed? [yes]    No issues reported in regards to [accessibility, affordability, adherence, adverse effects, or organization]  _______________________________________________________________________  Sepsis ASSESSMENT    -Sepsis due to pneumonia  -Allergies on file to ciprofloxacin and azithromycin à current therapies ceftriaxone and doxycycline    -LONGSTANDING TOBACCO USE WITH SUSPECTED COPD  _______________________________________________________________________  PATIENT EDUCATION/GOALS  - Answered all patient questions and concerns  - Consider smoking cessation  _______________________________________________________________________  RECOMMENDATIONS/PLAN  Please send prescriptions to  [facility] for assistance on insurance prior authorization and copay. Prescriptions will be delivered to the patient's bedside prior to discharge with the Meds to Beds program.   Continuation of care will be provided by the outpatient clinical pharmacy team in collaboration with the patient's  Primary Care Provider     Verbal consent to manage patient's drug therapy was obtained from the patient. They were informed they may decline to participate or withdraw from participation in pharmacy services at any time.

## 2024-05-16 PROBLEM — R79.0 LOW MAGNESIUM LEVEL: Status: ACTIVE | Noted: 2024-05-16

## 2024-05-16 PROBLEM — E87.6 HYPOKALEMIA: Status: ACTIVE | Noted: 2024-05-16

## 2024-05-16 LAB
ANION GAP SERPL CALC-SCNC: 11 MMOL/L (ref 10–20)
ATRIAL RATE: 178 BPM
ATRIAL RATE: 94 BPM
BUN SERPL-MCNC: 9 MG/DL (ref 6–23)
CALCIUM SERPL-MCNC: 6.3 MG/DL (ref 8.6–10.3)
CHLORIDE SERPL-SCNC: 109 MMOL/L (ref 98–107)
CO2 SERPL-SCNC: 17 MMOL/L (ref 21–32)
CREAT SERPL-MCNC: 0.47 MG/DL (ref 0.5–1.3)
EGFRCR SERPLBLD CKD-EPI 2021: >90 ML/MIN/1.73M*2
ERYTHROCYTE [DISTWIDTH] IN BLOOD BY AUTOMATED COUNT: 12.9 % (ref 11.5–14.5)
GLUCOSE SERPL-MCNC: 84 MG/DL (ref 74–99)
HCT VFR BLD AUTO: 35.1 % (ref 41–52)
HGB BLD-MCNC: 12 G/DL (ref 13.5–17.5)
MAGNESIUM SERPL-MCNC: 1.57 MG/DL (ref 1.6–2.4)
MCH RBC QN AUTO: 32.5 PG (ref 26–34)
MCHC RBC AUTO-ENTMCNC: 34.2 G/DL (ref 32–36)
MCV RBC AUTO: 95 FL (ref 80–100)
NRBC BLD-RTO: 0 /100 WBCS (ref 0–0)
P AXIS: 74 DEGREES
P OFFSET: 212 MS
P ONSET: 163 MS
PLATELET # BLD AUTO: 530 X10*3/UL (ref 150–450)
POTASSIUM SERPL-SCNC: 3.3 MMOL/L (ref 3.5–5.3)
PR INTERVAL: 132 MS
Q ONSET: 214 MS
Q ONSET: 229 MS
QRS COUNT: 15 BEATS
QRS COUNT: 25 BEATS
QRS DURATION: 82 MS
QRS DURATION: 84 MS
QT INTERVAL: 296 MS
QT INTERVAL: 342 MS
QTC CALCULATION(BAZETT): 427 MS
QTC CALCULATION(BAZETT): 470 MS
QTC FREDERICIA: 397 MS
QTC FREDERICIA: 403 MS
R AXIS: 32 DEGREES
R AXIS: 33 DEGREES
RBC # BLD AUTO: 3.69 X10*6/UL (ref 4.5–5.9)
SODIUM SERPL-SCNC: 134 MMOL/L (ref 136–145)
T AXIS: 42 DEGREES
T AXIS: 48 DEGREES
T OFFSET: 362 MS
T OFFSET: 400 MS
VENTRICULAR RATE: 152 BPM
VENTRICULAR RATE: 94 BPM
WBC # BLD AUTO: 16.6 X10*3/UL (ref 4.4–11.3)

## 2024-05-16 PROCEDURE — 83735 ASSAY OF MAGNESIUM: CPT | Performed by: INTERNAL MEDICINE

## 2024-05-16 PROCEDURE — 2500000005 HC RX 250 GENERAL PHARMACY W/O HCPCS: Performed by: STUDENT IN AN ORGANIZED HEALTH CARE EDUCATION/TRAINING PROGRAM

## 2024-05-16 PROCEDURE — 2500000001 HC RX 250 WO HCPCS SELF ADMINISTERED DRUGS (ALT 637 FOR MEDICARE OP): Performed by: INTERNAL MEDICINE

## 2024-05-16 PROCEDURE — 36415 COLL VENOUS BLD VENIPUNCTURE: CPT | Performed by: INTERNAL MEDICINE

## 2024-05-16 PROCEDURE — 85027 COMPLETE CBC AUTOMATED: CPT | Performed by: INTERNAL MEDICINE

## 2024-05-16 PROCEDURE — 2060000001 HC INTERMEDIATE ICU ROOM DAILY

## 2024-05-16 PROCEDURE — 99232 SBSQ HOSP IP/OBS MODERATE 35: CPT | Performed by: NURSE PRACTITIONER

## 2024-05-16 PROCEDURE — 2500000006 HC RX 250 W HCPCS SELF ADMINISTERED DRUGS (ALT 637 FOR ALL PAYERS): Performed by: INTERNAL MEDICINE

## 2024-05-16 PROCEDURE — 2500000004 HC RX 250 GENERAL PHARMACY W/ HCPCS (ALT 636 FOR OP/ED): Performed by: INTERNAL MEDICINE

## 2024-05-16 PROCEDURE — 99233 SBSQ HOSP IP/OBS HIGH 50: CPT | Performed by: INTERNAL MEDICINE

## 2024-05-16 PROCEDURE — 80048 BASIC METABOLIC PNL TOTAL CA: CPT | Performed by: INTERNAL MEDICINE

## 2024-05-16 PROCEDURE — 2500000006 HC RX 250 W HCPCS SELF ADMINISTERED DRUGS (ALT 637 FOR ALL PAYERS): Performed by: STUDENT IN AN ORGANIZED HEALTH CARE EDUCATION/TRAINING PROGRAM

## 2024-05-16 PROCEDURE — 2500000001 HC RX 250 WO HCPCS SELF ADMINISTERED DRUGS (ALT 637 FOR MEDICARE OP): Performed by: STUDENT IN AN ORGANIZED HEALTH CARE EDUCATION/TRAINING PROGRAM

## 2024-05-16 PROCEDURE — 2500000004 HC RX 250 GENERAL PHARMACY W/ HCPCS (ALT 636 FOR OP/ED): Performed by: STUDENT IN AN ORGANIZED HEALTH CARE EDUCATION/TRAINING PROGRAM

## 2024-05-16 RX ORDER — MAGNESIUM SULFATE HEPTAHYDRATE 40 MG/ML
2 INJECTION, SOLUTION INTRAVENOUS ONCE
Status: COMPLETED | OUTPATIENT
Start: 2024-05-16 | End: 2024-05-16

## 2024-05-16 RX ORDER — POTASSIUM CHLORIDE 750 MG/1
40 TABLET, FILM COATED, EXTENDED RELEASE ORAL ONCE
Status: COMPLETED | OUTPATIENT
Start: 2024-05-16 | End: 2024-05-16

## 2024-05-16 RX ADMIN — DOXYCYCLINE 100 MG: 100 INJECTION, POWDER, LYOPHILIZED, FOR SOLUTION INTRAVENOUS at 04:18

## 2024-05-16 RX ADMIN — POTASSIUM CHLORIDE 40 MEQ: 750 TABLET, FILM COATED, EXTENDED RELEASE ORAL at 09:26

## 2024-05-16 RX ADMIN — TAMSULOSIN HYDROCHLORIDE 0.4 MG: 0.4 CAPSULE ORAL at 08:17

## 2024-05-16 RX ADMIN — PANTOPRAZOLE SODIUM 40 MG: 40 TABLET, DELAYED RELEASE ORAL at 08:17

## 2024-05-16 RX ADMIN — MAGNESIUM SULFATE HEPTAHYDRATE 2 G: 40 INJECTION, SOLUTION INTRAVENOUS at 09:26

## 2024-05-16 RX ADMIN — DOXYCYCLINE 100 MG: 100 INJECTION, POWDER, LYOPHILIZED, FOR SOLUTION INTRAVENOUS at 15:42

## 2024-05-16 RX ADMIN — Medication 2 L/MIN: at 20:03

## 2024-05-16 RX ADMIN — CEFTRIAXONE SODIUM 2 G: 2 INJECTION, SOLUTION INTRAVENOUS at 17:07

## 2024-05-16 RX ADMIN — Medication 2 L/MIN: at 08:17

## 2024-05-16 RX ADMIN — DILTIAZEM HYDROCHLORIDE 240 MG: 240 CAPSULE, EXTENDED RELEASE ORAL at 08:17

## 2024-05-16 RX ADMIN — ASPIRIN 81 MG CHEWABLE TABLET 81 MG: 81 TABLET CHEWABLE at 08:17

## 2024-05-16 RX ADMIN — APIXABAN 5 MG: 5 TABLET, FILM COATED ORAL at 20:03

## 2024-05-16 RX ADMIN — APIXABAN 5 MG: 5 TABLET, FILM COATED ORAL at 08:17

## 2024-05-16 ASSESSMENT — COGNITIVE AND FUNCTIONAL STATUS - GENERAL
DAILY ACTIVITIY SCORE: 24
MOBILITY SCORE: 24
MOBILITY SCORE: 24
DAILY ACTIVITIY SCORE: 24

## 2024-05-16 ASSESSMENT — ENCOUNTER SYMPTOMS
NERVOUS/ANXIOUS: 0
APPETITE CHANGE: 1
HEMATURIA: 0
BACK PAIN: 0
WHEEZING: 0
NECK PAIN: 0
WEAKNESS: 0
FEVER: 0
HALLUCINATIONS: 0
CHEST TIGHTNESS: 0
FACIAL SWELLING: 0
VOMITING: 0
DYSURIA: 0
FATIGUE: 1
JOINT SWELLING: 0
AGITATION: 0
CHOKING: 0
PALPITATIONS: 0
LIGHT-HEADEDNESS: 0
SHORTNESS OF BREATH: 1
CONFUSION: 0
CONSTIPATION: 0
SINUS PAIN: 1
NAUSEA: 0
ABDOMINAL PAIN: 0
WOUND: 0
COUGH: 1
DIARRHEA: 0
DIAPHORESIS: 0
CHILLS: 1
SORE THROAT: 0

## 2024-05-16 ASSESSMENT — PAIN SCALES - GENERAL
PAINLEVEL_OUTOF10: 0 - NO PAIN

## 2024-05-16 ASSESSMENT — PAIN - FUNCTIONAL ASSESSMENT
PAIN_FUNCTIONAL_ASSESSMENT: 0-10
PAIN_FUNCTIONAL_ASSESSMENT: 0-10

## 2024-05-16 NOTE — CARE PLAN
The patient's goals for the shift include      The clinical goals for the shift include no arrhythmias      Problem: Pain  Goal: My pain/discomfort is manageable  Outcome: Progressing     Problem: Safety  Goal: Patient will be injury free during hospitalization  Outcome: Progressing  Goal: I will remain free of falls  Outcome: Progressing     Problem: Daily Care  Goal: Daily care needs are met  Outcome: Progressing     Problem: Psychosocial Needs  Goal: Demonstrates ability to cope with hospitalization/illness  Outcome: Progressing  Goal: Collaborate with me, my family, and caregiver to identify my specific goals  Outcome: Progressing     Problem: Discharge Barriers  Goal: My discharge needs are met  Outcome: Progressing     Problem: Fall/Injury  Goal: Not fall by end of shift  Outcome: Progressing  Goal: Be free from injury by end of the shift  Outcome: Progressing  Goal: Verbalize understanding of personal risk factors for fall in the hospital  Outcome: Progressing  Goal: Verbalize understanding of risk factor reduction measures to prevent injury from fall in the home  Outcome: Progressing  Goal: Use assistive devices by end of the shift  Outcome: Progressing  Goal: Pace activities to prevent fatigue by end of the shift  Outcome: Progressing     Problem: Pain - Adult  Goal: Verbalizes/displays adequate comfort level or baseline comfort level  Outcome: Progressing     Problem: Safety - Adult  Goal: Free from fall injury  Outcome: Progressing     Problem: Discharge Planning  Goal: Discharge to home or other facility with appropriate resources  Outcome: Progressing     Problem: Chronic Conditions and Co-morbidities  Goal: Patient's chronic conditions and co-morbidity symptoms are monitored and maintained or improved  Outcome: Progressing

## 2024-05-16 NOTE — PROGRESS NOTES
Cedric Mohan is a 79 y.o. male on day 3 of admission presenting with Sepsis due to pneumonia (Multi).    Review of Systems   Constitutional:  Positive for appetite change, chills and fatigue. Negative for diaphoresis and fever.   HENT:  Positive for congestion and sinus pain. Negative for facial swelling, sneezing and sore throat.    Respiratory:  Positive for cough and shortness of breath. Negative for choking, chest tightness and wheezing.    Cardiovascular:  Negative for chest pain, palpitations and leg swelling.   Gastrointestinal:  Negative for abdominal pain, constipation, diarrhea, nausea and vomiting.   Genitourinary:  Negative for dysuria, hematuria and urgency.   Musculoskeletal:  Negative for back pain, gait problem, joint swelling and neck pain.   Skin:  Negative for rash and wound.   Neurological:  Negative for syncope, weakness and light-headedness.   Psychiatric/Behavioral:  Negative for agitation, confusion and hallucinations. The patient is not nervous/anxious.    All other systems reviewed and are negative.     Subjective   Cedric Mohan is a 79 y.o. male with PMHx s/f HTN, tobacco use disorder (no dx COPD, no PFTs), BPH with LUTS, bladder cancer s/p TURBT and mitomycin intravesical instillation (01/2023), presenting with shortness of breath, productive cough (clear sputum), congestion, decreased appetite, and general malaise progressively worsening x ~3 weeks.  Patient had been in his typical state of health until about 3 weeks ago when he noticed some sinus congestion, noting both his daughter whom he lives with and son-in-law had also had some sinus congestion around that time.  However, since then, he feels like the congestion has moved down into his chest.  He has had progressively worsening cough, and notes when he coughs he does have some right-sided rib pain.  He has felt very short of breath when getting up and moving around, but is also experienced it intermittently at rest.  He  has some chronic chills, but has not really noticed any particular fever, rigors, night sweats.  He believes he has been eating and drinking near his baseline, but his daughter at the bedside notes he has had decreased appetite and not really eating as much is normal over the last 3 weeks.  He denies chest pain/pressure, dizziness or lightheadedness, diaphoresis, orthopnea, lower extremity edema, nausea, vomiting, abdominal pain, diarrhea, changes in urination, headache, vision changes, focal and/or lateralizing sensory or motor deficits.  Note that while patient was in the emergency department, he had an episode of atrial fibrillation with rapid ventricular response; patient denies any prior history of A-fib or other arrhythmias.     ED Course (Summary):   Vitals on presentation: T101.3, /74, , RR 28, SpO2 93% RA (dropped down to <90% and is currently on 2 L nasal cannula at 94%)  Labs: CBC with WBC 24.5 and left shift, Hgb 14.3, platelets 708.  CMP with glucose 125, sodium 132, potassium 4.0, bicarb 20, BUN 15, serum creatinine 0.88.  Magnesium 1.69.  BNP 25.  High-sensitivity troponin 13.  Lactate 1.8.  UA: Trace leukocyte esterase, 21-50 WBCs.  Imaging: CT PE and CT abdomen pelvis with IV contrast is notable for right middle lobe infiltrate and probable dependent changes in the right lung base, no acute pathologies in the abdomen and pelvis; there is evidence of chronic emphysematous disease in the chest  Interventions: 1 L normal saline, 1 L LR, vancomycin/Zosyn, 975 mg Tylenol, 2 g IV magnesium    5/14: Patient seen.  Remains on 3 L O2 by NC.  Uses no oxygen at rest.  No prior history of COPD or asthma.  States he had 1 prior episode of pneumonia.  Still has a cough, thinks he has mucus but has been unable to mobilize.  Will order a flutter valve.  Patient is a lobar pneumonia, suspect pneumococcal but urinary antigens were negative.  Continue to treat empirically.  Reassess in AM.  Continue IV  antibiotics and wean O2 as able.    5/15: Patient seen.  Weaned to 2 L O2 by NC.  Developed episodes of atrial fibrillation yesterday.  Started on anticoagulation.  Consult placed to cardiology.  Checking magnesium, if okay will start amiodarone.  Switching to apixaban today.  Continue antibiotic therapy, patient appears to be improving.  Ordered a flutter valve.  Discussed with patient and family in the room.    5/16: Patient seen.  Still feels better but remains on 2 L.  Continue increased dose of Cardizem and apixaban.  Magnesium low and was replaced.  Continue antibiotic therapy another 24 hours and reassess.  May need O2 on discharge.  WBC count remains elevated.         Objective     Last Recorded Vitals  /78 (BP Location: Right arm, Patient Position: Lying)   Pulse 80   Temp 36.4 °C (97.6 °F) (Temporal)   Resp 18   Wt 65 kg (143 lb 4.8 oz)   SpO2 92%   Intake/Output last 3 Shifts:    Intake/Output Summary (Last 24 hours) at 5/16/2024 1818  Last data filed at 5/16/2024 1642  Gross per 24 hour   Intake 364.5 ml   Output --   Net 364.5 ml         Admission Weight  Weight: 66.2 kg (146 lb) (05/13/24 0958)    Daily Weight  05/16/24 : 65 kg (143 lb 4.8 oz)      Physical Exam  HENT:      Head: Normocephalic and atraumatic.      Nose: Nose normal. No congestion or rhinorrhea.      Mouth/Throat:      Mouth: Mucous membranes are dry.      Pharynx: Oropharynx is clear.   Eyes:      General: No scleral icterus.     Extraocular Movements: Extraocular movements intact.      Pupils: Pupils are equal, round, and reactive to light.   Cardiovascular:      Rate and Rhythm: Normal rate and regular rhythm.      Heart sounds: Normal heart sounds. No murmur heard.     No friction rub. No gallop.   Pulmonary:      Effort: Pulmonary effort is normal.      Breath sounds: Examination of the right-upper field reveals rhonchi. Examination of the right-middle field reveals rhonchi. Rhonchi present. No wheezing or rales.    Chest:      Chest wall: No tenderness.   Abdominal:      General: There is no distension.      Palpations: Abdomen is soft.      Tenderness: There is no abdominal tenderness. There is no guarding or rebound.   Musculoskeletal:         General: No swelling, tenderness or signs of injury. Normal range of motion.      Cervical back: Normal range of motion.   Skin:     General: Skin is warm and dry.      Coloration: Skin is not jaundiced.      Findings: No bruising, erythema or rash.      Comments: Seborrheic keratoses on the back predominantly   Neurological:      General: No focal deficit present.      Mental Status: He is oriented to person, place, and time.          Lab Results  Results for orders placed or performed during the hospital encounter of 05/13/24 (from the past 24 hour(s))   CBC   Result Value Ref Range    WBC 16.6 (H) 4.4 - 11.3 x10*3/uL    nRBC 0.0 0.0 - 0.0 /100 WBCs    RBC 3.69 (L) 4.50 - 5.90 x10*6/uL    Hemoglobin 12.0 (L) 13.5 - 17.5 g/dL    Hematocrit 35.1 (L) 41.0 - 52.0 %    MCV 95 80 - 100 fL    MCH 32.5 26.0 - 34.0 pg    MCHC 34.2 32.0 - 36.0 g/dL    RDW 12.9 11.5 - 14.5 %    Platelets 530 (H) 150 - 450 x10*3/uL   Basic Metabolic Panel   Result Value Ref Range    Glucose 84 74 - 99 mg/dL    Sodium 134 (L) 136 - 145 mmol/L    Potassium 3.3 (L) 3.5 - 5.3 mmol/L    Chloride 109 (H) 98 - 107 mmol/L    Bicarbonate 17 (L) 21 - 32 mmol/L    Anion Gap 11 10 - 20 mmol/L    Urea Nitrogen 9 6 - 23 mg/dL    Creatinine 0.47 (L) 0.50 - 1.30 mg/dL    eGFR >90 >60 mL/min/1.73m*2    Calcium 6.3 (L) 8.6 - 10.3 mg/dL   Magnesium   Result Value Ref Range    Magnesium 1.57 (L) 1.60 - 2.40 mg/dL        Image Results  Electrocardiogram, 12-lead PRN ACS symptoms  Sinus rhythm with Premature atrial complexes  Otherwise normal ECG  When compared with ECG of 14-MAY-2024 18:41, (unconfirmed)  No significant change was found  Confirmed by Jerrod Milner ([id number]) on 5/16/2024 2:41:57 PM  Electrocardiogram, 12-lead PRN ACS  symptoms  Atrial fibrillation with rapid ventricular response  Abnormal ECG  When compared with ECG of 14-MAY-2024 18:42, (unconfirmed)  Atrial fibrillation has replaced Sinus rhythm  Vent. rate has increased BY  58 BPM  Nonspecific T wave abnormality now evident in Inferior leads  Confirmed by Jerrod Milner ([id number]) on 5/16/2024 2:39:35 PM       Assessment/Plan     * Sepsis due to pneumonia (Multi)  Assessment & Plan  -SIRS criteria (4/4)   -Source: Pulm (see imaging results in HPI and results section) and  (UA: Trace leukocyte esterase, 21-50 WBCs, remainder pending at this time)  -End-organ dysfunction: hypoxia, transient atrial fibrillation with RVR (new arrhythmia)  -Lactate 1.8  -BP is normotensive. Has gotten 2L IVF.   -Blood cultures x2. Urine cx. MRSA nares. Urine antigens. Sputum culture.   -Continue antibiotic coverage with Ceftriaxone 2g q24h and Doxycycline 100mg BID (reactions to ciprofloxacin and azithromycin in the past)  -Follow fever curve, WBCs   5/14: RML on CT scan.  Urinary antigens negative. Continue empiric treatment. Marked leukocytosis.  Continue current antibiotics.  Suspect pneumococcal pneumonia based on presentation.  5/15: Seems a little better, WBC improved. Remains on 3L.  Weaned to 2 L today.  Continue antibiotic therapy.  5/16: Remains on 2 L.  WBC count still elevated.  Continue flutter valve.  Reassess in AM.    Low magnesium level  Assessment & Plan  Replace and recheck.    Hypokalemia  Assessment & Plan  Replace and recheck.    Acute hypoxic respiratory failure (Multi)  Assessment & Plan  -Currently requiring 2 L nasal cannula to maintain >90%  -Not documented how low he dropped but will continue to follow and wean O2 as tolerated  -Suspect secondary to above    Essential hypertension  Assessment & Plan  -Patient will be continued on home diltiazem in the morning, blood pressure is adequately controlled at this time  -Will continue to monitor overnight in setting of above  5/14:  BP is a little bit low.  Monitor closely with medications for A-fib.    Acute cystitis without hematuria  Assessment & Plan  Continue antibiotics. Adjust as needed. Awaiting culture.    Atrial fibrillation with rapid ventricular response (Multi)  Assessment & Plan  -Patient with episode of RVR into the 180s while in the emergency department, resolved with 1 L LR and 2 g IV mag  -Was not symptomatic during this episode, had a normal troponin and BNP on presentation  -Suspect that this is likely secondary to his sepsis rather than an intrinsic process; however, his daughter mention that while he was asleep his heart rate did escalate back to the 160s following this but it was in the 90s and a sinus rhythm while I was examining the patient  -Out of abundance of caution, patient will be continued on telemetry.  Will have an echocardiogram obtained.  -He will be continued on his home aspirin, does take diltiazem daily so this will also be continued in the morning  -Will check electrolytes and TSH in the morning  -If recurrent, will need to likely involve cardiology for management recommendations  5/14: LVEF is 55 to 60%.  Was in NSR most of the day but just flipped back into A-fib.  Will give another dose of Lopressor.  Consider adding if blood pressure is adequate.  Place consult to cardiology.  Suspect secondary to his pneumonia. [id number] Score 3.  Will start him on heparin.  5/15: HR very irregular last PM. Flipping between NSR, NSR with PACs, and what looks like afib. Often changes rapidly. On cardizem, has PRN beta blocker.  Seen by cardiology.  Will start amiodarone if magnesium levels appropriate.  Supplement otherwise.  Switching from heparin to apixaban.  5/16: Remains in NSR.  Did not require amiodarone.  Close follow-up with cardiology as outpatient.  Continue Cardizem and apixaban.    Tobacco abuse  Assessment & Plan  Longstanding tobacco use with suspected COPD/emphysema  -Patient with emphysematous  changes on CT PE, has been smoking since age 18  -Slightly diminished throughout the left lung fields without overt wheezing  -Patient will be continued on DuoNebs while here, as needed albuterol MDI  -Discussed obtaining PFTs after discharge and tobacco cessation  -NRT offered while admitted    Peptic reflux disease  Assessment & Plan  -Continue PPI while admitted    Enlarged prostate with lower urinary tract symptoms (LUTS)  Assessment & Plan  -Continued on home meds / flomax   -Continued outpatient follow-up with Urology (Dr. Ferrell) as scheduled     Cervical disc disorder  Assessment & Plan  -Continued outpatient follow-up as scheduled                   Js Orta MD

## 2024-05-16 NOTE — CARE PLAN
The patient's goals for the shift include  increased ambulation during shift.    The clinical goals for the shift include No sign of bleeding during shift      Problem: Pain  Goal: My pain/discomfort is manageable  Outcome: Progressing     Problem: Safety  Goal: Patient will be injury free during hospitalization  Outcome: Progressing     Problem: Daily Care  Goal: Daily care needs are met  Outcome: Progressing     Problem: Psychosocial Needs  Goal: Demonstrates ability to cope with hospitalization/illness  Outcome: Progressing

## 2024-05-16 NOTE — PROGRESS NOTES
[facility]  Patient Name: Cedric Mohan  Patient : 1945  Room/Bed:     HPI:   Cedric Mohan is a 79 y.o. male presenting with dyspnea.  H/o HTN, tobacco use disorder (no dx COPD, no PFTs), BPH with LUTS, bladder cancer s/p TURBT and mitomycin intravesical instillation (2023), presenting with shortness of breath, productive cough (clear sputum), congestion, decreased appetite, and general malaise progressively worsening x ~3 weeks.       In ED he was febrile, , Bpo 151/74.  Labs signif for leukocytosis with left shift, WBC 24.5k.  Mg 1.69, BNP 25, HSTI 13, lactate 1.8, UA trace LE, 21-50 WBC's.  CT PE and CT a/p with IV contrast notable for right middle lobe infiltrate concerning for PNA, and also emphysematous changes.  In ED he rec'd 1L NS, 1L LR, vanc/zosyn, tylenol, and IV Mg.     While patient was in the ED he had an episode of atrial fibrillation with RVR - new diagnosis of Afib for the patient.  Was in paroxysmal Afib with RVR yesterday, today maintaining SR mostly, with brief episode of pAfib 's around noon, converting to SR.     ROS:  The remainder of the review of systems was obtained, as was negative as pertains to the chief complaint.     Fhx:  no premature CAD  SocHx:  1ppd tob use > cut down to 4 cigs/day prior to admission, denies EtOH/illicits  Allergies:  Ciprofloxacin, Droxy, Alfuzosin, Azithromycin, and Tramadol    Subjective Data:  24:  Denies any chest pain/pressure.  SOB is improving.  SR on telemetry.    Overnight Events:  None    Objective Data:  Vitals:    05/15/24 2326 24 0314 24 0720 24 1105   BP: 124/73 124/73 117/71 121/76   BP Location: Right arm Right arm Right arm Right arm   Patient Position: Lying Lying Lying Lying   Pulse: 90 85 88 82   Resp: 20 18 18 18   Temp: 37.5 °C (99.5 °F) 36.7 °C (98 °F) 36.3 °C (97.4 °F) 36.4 °C (97.6 °F)   TempSrc: Temporal Temporal Temporal Temporal   SpO2: 92%  "92% 94% 93%   Weight:  65 kg (143 lb 4.8 oz)     Height:           Last Labs:  Results from last 7 days   Lab Units 05/16/24  0116 05/15/24  0618 05/14/24  0550   WBC AUTO x10*3/uL 16.6* 17.2* 20.4*   HEMOGLOBIN g/dL 12.0* 11.9* 12.1*   HEMATOCRIT % 35.1* 35.3* 36.0*   PLATELETS AUTO x10*3/uL 530* 566* 578*       Results from last 7 days   Lab Units 05/16/24  0513 05/15/24  0618 05/14/24  0550 05/13/24  1021   SODIUM mmol/L 134* 131* 133* 132*   POTASSIUM mmol/L 3.3* 3.5 3.8 4.0   CHLORIDE mmol/L 109* 102 102 99   CO2 mmol/L 17* 21 22 20*   BUN mg/dL 9 14 10 15   CREATININE mg/dL 0.47* 0.66 0.65 0.88   CALCIUM mg/dL 6.3* 7.8* 7.9* 8.7   PROTEIN TOTAL g/dL  --   --  6.0* 7.4   BILIRUBIN TOTAL mg/dL  --   --  0.6 1.1   ALK PHOS U/L  --   --  134 170*   ALT U/L  --   --  23 30   AST U/L  --   --  20 31   GLUCOSE mg/dL 84 97 104* 125*       Results from last 7 days   Lab Units 05/16/24  0513 05/15/24  1636 05/14/24  0550   MAGNESIUM mg/dL 1.57* 1.98 1.96       No results found for: \"LDLF\"     No results found for: \"BNP\"    No results found for: \"TROPHS\"     Lab Results   Component Value Date    TSH 1.12 05/14/2024           No results found for: \"HGBA1C\"    Intake/Output    Intake/Output Summary (Last 24 hours) at 5/16/2024 1320  Last data filed at 5/16/2024 1126  Gross per 24 hour   Intake 664.5 ml   Output --   Net 664.5 ml      I/O last 2 completed shifts:  In: 759.5 (11.7 mL/kg) [I.V.:259.5 (4 mL/kg); IV Piggyback:500]  Out: - (0 mL/kg)   Weight: 65 kg     Past Medical History:  He has a past medical history of Other conditions influencing health status, Other conditions influencing health status, and Other conditions influencing health status.    Past Surgical History:  He has a past surgical history that includes Other surgical history (05/23/2013); Hernia repair (07/05/2013); Other surgical history (03/17/2017); Hernia repair (06/11/2013); Other surgical history (06/11/2013); and Other surgical history " (06/11/2013).      Social History:  He reports that he has been smoking cigarettes. He has been exposed to tobacco smoke. He has never used smokeless tobacco. He reports that he does not drink alcohol and does not use drugs.    Family History:  Family History   Problem Relation Name Age of Onset    Breast cancer Mother      Other (stroke syndrome) Mother      Prostate cancer Brother         Outpatient Medications  No current facility-administered medications on file prior to encounter.     Current Outpatient Medications on File Prior to Encounter   Medication Sig Dispense Refill    Arthritis Pain Relief, acetam, 650 mg ER tablet Take 2 tablets (1,300 mg) by mouth 2 times a day as needed.      aspirin 81 mg chewable tablet Chew 1 tablet (81 mg) once daily.      dilTIAZem CD (Cardizem CD) 240 mg 24 hr capsule Take 1 capsule (240 mg) by mouth once daily. 90 capsule 1    esomeprazole (NexIUM) 40 mg DR capsule Take 1 capsule (40 mg) by mouth once daily in the morning. Take before meals. Do not open capsule. 90 capsule 1    fluticasone (Flonase) 50 mcg/actuation nasal spray Administer 1 spray into each nostril once daily. Shake gently. Before first use, prime pump. After use, clean tip and replace cap. (Patient taking differently: Administer 1 spray into each nostril once daily as needed. Shake gently. Before first use, prime pump. After use, clean tip and replace cap.) 16 g 3    multivitamin tablet Take 1 tablet by mouth once daily.      tamsulosin (Flomax) 0.4 mg 24 hr capsule Take 1 capsule (0.4 mg) by mouth once daily. Daily before bed 90 capsule 3       Scheduled medications  apixaban, 5 mg, oral, q12h  aspirin, 81 mg, oral, Daily  cefTRIAXone, 2 g, intravenous, q24h  dilTIAZem CD, 240 mg, oral, Daily  doxycycline, 100 mg, intravenous, q12h  metoprolol, 5 mg, intravenous, Once  oxygen, , inhalation, Continuous - Inhalation  pantoprazole, 40 mg, oral, Daily before breakfast   Or  pantoprazole, 40 mg, intravenous,  "Daily before breakfast  polyethylene glycol, 17 g, oral, Daily  tamsulosin, 0.4 mg, oral, Daily      Continuous medications     PRN medications  PRN medications: acetaminophen, albuterol, bisacodyl, bisacodyl, fluticasone, guaiFENesin, heparin, ipratropium-albuteroL, melatonin, ondansetron **OR** ondansetron   Medications Prior to Admission   Medication Sig Dispense Refill Last Dose    Arthritis Pain Relief, acetam, 650 mg ER tablet Take 2 tablets (1,300 mg) by mouth 2 times a day as needed.       aspirin 81 mg chewable tablet Chew 1 tablet (81 mg) once daily.       dilTIAZem CD (Cardizem CD) 240 mg 24 hr capsule Take 1 capsule (240 mg) by mouth once daily. 90 capsule 1     esomeprazole (NexIUM) 40 mg DR capsule Take 1 capsule (40 mg) by mouth once daily in the morning. Take before meals. Do not open capsule. 90 capsule 1     fluticasone (Flonase) 50 mcg/actuation nasal spray Administer 1 spray into each nostril once daily. Shake gently. Before first use, prime pump. After use, clean tip and replace cap. (Patient taking differently: Administer 1 spray into each nostril once daily as needed. Shake gently. Before first use, prime pump. After use, clean tip and replace cap.) 16 g 3     multivitamin tablet Take 1 tablet by mouth once daily.       tamsulosin (Flomax) 0.4 mg 24 hr capsule Take 1 capsule (0.4 mg) by mouth once daily. Daily before bed 90 capsule 3        Echo 5/14/24:   1. Left ventricular systolic function is normal with a 55-60% estimated ejection fraction.   2. Spectral Doppler shows an impaired relaxation pattern of left ventricular diastolic filling.   3. There is low normal right ventricular systolic function.  No results found for: \"EF\"     Physical Exam:  Vitals reviewed.   Constitutional:       Appearance: Healthy appearance.   Pulmonary:      Effort: Pulmonary effort is normal.      Breath sounds: Normal breath sounds.   Cardiovascular:      PMI at left midclavicular line. Normal rate. Regular " rhythm. S1 with normal intensity. S2 with normal intensity.       Murmurs: There is no murmur.   Edema:     Peripheral edema absent.   Abdominal:      General: Bowel sounds are normal.   Skin:     General: Skin is warm and dry.   Psychiatric:         Attention and Perception: Attention normal.         Mood and Affect: Mood normal.         Behavior: Behavior is cooperative.         Assessment/Plan:   Atrial fibrillation:  new diagnosis of paroxysmal Afib with RVR in setting of sepsis from PNA, hypomagnesemia.  One episode of 's this afternoon.  Currently maintaining SR HR 80-90's.    TTE reviewed, normal LV fcn, no significant VHD  -continue rate control with cardizem CD 240mg PO daily  -check Mg - if WNL, to help maintain sinus rhythm, can start amiodarone 400mg BID x 7days, then 400mg daily x 7 days, then 200mg daily with total course of 30 days  -replete K/Mg  -currently on IV heparin > [id number] is 3 - patient would benefit from OAC with eliquis for CVA prophylaxis - start apixaban 5mg bid  -recommend outpt cardio follow up and holter after 30 days amiodarone is completed    5/16/24:  Remains SR on telemetry.  Continue diltiazem 240 mg daily and apixaban 5 mg BID.  Mag 1.57 and replacement ordered per hospitalist.  He remains SR without the use of Amiodarone.  Will arrange f/u with our office in 2-3 weeks.  Cardiology team is signing off.  Please call for questions or if further follow-up is needed.      PNA:    -Abx per primary service. On 2L NC.     HTN:  -serial monitoring on home meds     Hypomagnesemia:  -replete Mg as above    Code Status  Full Code      Lamar Mae, APRN-CNP

## 2024-05-17 ENCOUNTER — PHARMACY VISIT (OUTPATIENT)
Dept: PHARMACY | Facility: CLINIC | Age: 79
End: 2024-05-17
Payer: COMMERCIAL

## 2024-05-17 VITALS
OXYGEN SATURATION: 90 % | HEART RATE: 91 BPM | SYSTOLIC BLOOD PRESSURE: 130 MMHG | DIASTOLIC BLOOD PRESSURE: 77 MMHG | RESPIRATION RATE: 18 BRPM | BODY MASS INDEX: 21.52 KG/M2 | TEMPERATURE: 97.4 F | HEIGHT: 68 IN | WEIGHT: 141.98 LBS

## 2024-05-17 LAB
ANION GAP SERPL CALC-SCNC: 11 MMOL/L (ref 10–20)
BACTERIA BLD CULT: NORMAL
BACTERIA BLD CULT: NORMAL
BUN SERPL-MCNC: 12 MG/DL (ref 6–23)
CALCIUM SERPL-MCNC: 7.9 MG/DL (ref 8.6–10.3)
CHLORIDE SERPL-SCNC: 105 MMOL/L (ref 98–107)
CO2 SERPL-SCNC: 21 MMOL/L (ref 21–32)
CREAT SERPL-MCNC: 0.61 MG/DL (ref 0.5–1.3)
EGFRCR SERPLBLD CKD-EPI 2021: >90 ML/MIN/1.73M*2
ERYTHROCYTE [DISTWIDTH] IN BLOOD BY AUTOMATED COUNT: 13 % (ref 11.5–14.5)
GLUCOSE SERPL-MCNC: 110 MG/DL (ref 74–99)
HCT VFR BLD AUTO: 35 % (ref 41–52)
HGB BLD-MCNC: 11.8 G/DL (ref 13.5–17.5)
MAGNESIUM SERPL-MCNC: 2.2 MG/DL (ref 1.6–2.4)
MCH RBC QN AUTO: 32.1 PG (ref 26–34)
MCHC RBC AUTO-ENTMCNC: 33.7 G/DL (ref 32–36)
MCV RBC AUTO: 95 FL (ref 80–100)
NRBC BLD-RTO: 0 /100 WBCS (ref 0–0)
PLATELET # BLD AUTO: 610 X10*3/UL (ref 150–450)
POTASSIUM SERPL-SCNC: 3.8 MMOL/L (ref 3.5–5.3)
RBC # BLD AUTO: 3.68 X10*6/UL (ref 4.5–5.9)
SODIUM SERPL-SCNC: 133 MMOL/L (ref 136–145)
WBC # BLD AUTO: 15.9 X10*3/UL (ref 4.4–11.3)

## 2024-05-17 PROCEDURE — 2500000004 HC RX 250 GENERAL PHARMACY W/ HCPCS (ALT 636 FOR OP/ED): Performed by: STUDENT IN AN ORGANIZED HEALTH CARE EDUCATION/TRAINING PROGRAM

## 2024-05-17 PROCEDURE — 85027 COMPLETE CBC AUTOMATED: CPT | Performed by: INTERNAL MEDICINE

## 2024-05-17 PROCEDURE — 99239 HOSP IP/OBS DSCHRG MGMT >30: CPT | Performed by: INTERNAL MEDICINE

## 2024-05-17 PROCEDURE — 2500000006 HC RX 250 W HCPCS SELF ADMINISTERED DRUGS (ALT 637 FOR ALL PAYERS): Performed by: STUDENT IN AN ORGANIZED HEALTH CARE EDUCATION/TRAINING PROGRAM

## 2024-05-17 PROCEDURE — 2500000001 HC RX 250 WO HCPCS SELF ADMINISTERED DRUGS (ALT 637 FOR MEDICARE OP): Performed by: INTERNAL MEDICINE

## 2024-05-17 PROCEDURE — RXMED WILLOW AMBULATORY MEDICATION CHARGE

## 2024-05-17 PROCEDURE — 36415 COLL VENOUS BLD VENIPUNCTURE: CPT | Performed by: INTERNAL MEDICINE

## 2024-05-17 PROCEDURE — 83735 ASSAY OF MAGNESIUM: CPT | Performed by: INTERNAL MEDICINE

## 2024-05-17 PROCEDURE — 94761 N-INVAS EAR/PLS OXIMETRY MLT: CPT

## 2024-05-17 PROCEDURE — 2500000005 HC RX 250 GENERAL PHARMACY W/O HCPCS: Performed by: STUDENT IN AN ORGANIZED HEALTH CARE EDUCATION/TRAINING PROGRAM

## 2024-05-17 PROCEDURE — 80048 BASIC METABOLIC PNL TOTAL CA: CPT | Performed by: INTERNAL MEDICINE

## 2024-05-17 PROCEDURE — 2500000001 HC RX 250 WO HCPCS SELF ADMINISTERED DRUGS (ALT 637 FOR MEDICARE OP): Performed by: STUDENT IN AN ORGANIZED HEALTH CARE EDUCATION/TRAINING PROGRAM

## 2024-05-17 RX ORDER — DOXYCYCLINE 100 MG/1
100 CAPSULE ORAL 2 TIMES DAILY
Qty: 8 CAPSULE | Refills: 0 | Status: SHIPPED | OUTPATIENT
Start: 2024-05-17 | End: 2024-05-21

## 2024-05-17 RX ORDER — ALBUTEROL SULFATE 90 UG/1
2 AEROSOL, METERED RESPIRATORY (INHALATION) EVERY 6 HOURS PRN
Qty: 20.1 G | Refills: 11 | Status: SHIPPED | OUTPATIENT
Start: 2024-05-17 | End: 2024-06-16

## 2024-05-17 RX ORDER — CEFDINIR 300 MG/1
300 CAPSULE ORAL 2 TIMES DAILY
Qty: 8 CAPSULE | Refills: 0 | Status: SHIPPED | OUTPATIENT
Start: 2024-05-17 | End: 2024-05-21

## 2024-05-17 RX ADMIN — DOXYCYCLINE 100 MG: 100 INJECTION, POWDER, LYOPHILIZED, FOR SOLUTION INTRAVENOUS at 03:47

## 2024-05-17 RX ADMIN — APIXABAN 5 MG: 5 TABLET, FILM COATED ORAL at 10:18

## 2024-05-17 RX ADMIN — TAMSULOSIN HYDROCHLORIDE 0.4 MG: 0.4 CAPSULE ORAL at 10:18

## 2024-05-17 RX ADMIN — Medication 2 L/MIN: at 08:00

## 2024-05-17 RX ADMIN — DILTIAZEM HYDROCHLORIDE 240 MG: 240 CAPSULE, EXTENDED RELEASE ORAL at 10:18

## 2024-05-17 RX ADMIN — PANTOPRAZOLE SODIUM 40 MG: 40 TABLET, DELAYED RELEASE ORAL at 10:18

## 2024-05-17 RX ADMIN — ASPIRIN 81 MG CHEWABLE TABLET 81 MG: 81 TABLET CHEWABLE at 10:18

## 2024-05-17 ASSESSMENT — PAIN SCALES - GENERAL: PAINLEVEL_OUTOF10: 0 - NO PAIN

## 2024-05-17 NOTE — PROGRESS NOTES
Respiratory Therapy Note    Home O2 Evaluation:    SpO2 on room air at rest:     90%    SpO2 on room air with exertion:     90%    SpO2 on oxygen at rest:     %    SpO2 on oxygen with exertion:     %    SpO2 on 4L/min O2 with exertion:    %    Ambulation distance:      555ft    Recommended O2 device: Room Air    Recommended O2 L/min: Room Air    Recommended FiO2 %:      Resting HR 91, Maximal observed

## 2024-05-17 NOTE — CARE PLAN
Problem: Pain  Goal: My pain/discomfort is manageable  Outcome: Progressing   The patient's goals for the shift include  to feel better    The clinical goals for the shift include SPO2 >88%

## 2024-05-17 NOTE — DISCHARGE INSTRUCTIONS
Follow up with PCP in 1-2 weeks. Call to schedule. Bring all your discharge paperwork and medications when you go to your follow up appointment. Return to ED if your symptoms come back.    Needs follow-up chest x-ray in 3 to 4 weeks.    You are starting anticoagulation therapy. Watch for signs of bleeding. Small cuts or wounds will take 2-3x longer to clot. So you will have to apply pressure for longer to stop bleeding. You should notify your doctor if you are coughing blood or see it in your urine. You should go the ER if you are throwing up blood or gritty material or if you see a lot of blood in your stool. You should go to the ER if you are struck in the head or torso. Typically patients tolerate anticoagulation without problems.

## 2024-05-17 NOTE — DISCHARGE SUMMARY
Discharge Diagnosis  Sepsis due to pneumonia (Multi)    Issues Requiring Follow-Up  Follow up with PCP in 1-2 weeks.  Needs follow up CXR in 3-4 weeks.  Follow with cardiology as outpatient as scheduled.    Discharge Meds     Your medication list        START taking these medications        Instructions Last Dose Given Next Dose Due   albuterol 90 mcg/actuation inhaler      Inhale 2 puffs every 6 hours if needed for wheezing or shortness of breath.       cefdinir 300 mg capsule  Commonly known as: Omnicef      Take 1 capsule (300 mg) by mouth 2 times a day for 4 days.       doxycycline 100 mg capsule  Commonly known as: Vibramycin      Take 1 capsule (100 mg) by mouth 2 times a day for 4 days. Take with at least 8 ounces (large glass) of water, do not lie down for 30 minutes after       Eliquis 5 mg tablet  Generic drug: apixaban      Take 1 tablet (5 mg) by mouth every 12 hours.              CHANGE how you take these medications        Instructions Last Dose Given Next Dose Due   fluticasone 50 mcg/actuation nasal spray  Commonly known as: Flonase  What changed:   when to take this  reasons to take this      Administer 1 spray into each nostril once daily. Shake gently. Before first use, prime pump. After use, clean tip and replace cap.              CONTINUE taking these medications        Instructions Last Dose Given Next Dose Due   Arthritis Pain Relief (acetam) 650 mg ER tablet  Generic drug: acetaminophen           aspirin 81 mg chewable tablet           dilTIAZem  mg 24 hr capsule  Commonly known as: Cardizem CD      Take 1 capsule (240 mg) by mouth once daily.       esomeprazole 40 mg DR capsule  Commonly known as: NexIUM      Take 1 capsule (40 mg) by mouth once daily in the morning. Take before meals. Do not open capsule.       multivitamin tablet           tamsulosin 0.4 mg 24 hr capsule  Commonly known as: Flomax      Take 1 capsule (0.4 mg) by mouth once daily. Daily before bed                  Where to Get Your Medications        These medications [facility]  [address] [address]      Hours: 8AM to 6PM Mon-Fri, 8AM to 4PM Sat-Sun Phone: [phone]   albuterol 90 mcg/actuation inhaler  cefdinir 300 mg capsule  doxycycline 100 mg capsule  Eliquis 5 mg tablet         Test Results Pending At Discharge  Pending Labs       Order Current Status    Blood Culture Preliminary result    Blood Culture Preliminary result            Hospital Course   Cedric Mohan is a 79 y.o. male with PMHx s/f HTN, tobacco use disorder (no dx COPD, no PFTs), BPH with LUTS, bladder cancer s/p TURBT and mitomycin intravesical instillation (01/2023), presenting with shortness of breath, productive cough (clear sputum), congestion, decreased appetite, and general malaise progressively worsening x ~3 weeks.  Patient had been in his typical state of health until about 3 weeks ago when he noticed some sinus congestion, noting both his daughter whom he lives with and son-in-law had also had some sinus congestion around that time.  However, since then, he feels like the congestion has moved down into his chest.  He has had progressively worsening cough, and notes when he coughs he does have some right-sided rib pain.  He has felt very short of breath when getting up and moving around, but is also experienced it intermittently at rest.  He has some chronic chills, but has not really noticed any particular fever, rigors, night sweats.  He believes he has been eating and drinking near his baseline, but his daughter at the bedside notes he has had decreased appetite and not really eating as much is normal over the last 3 weeks.  He denies chest pain/pressure, dizziness or lightheadedness, diaphoresis, orthopnea, lower extremity edema, nausea, vomiting, abdominal pain, diarrhea, changes in urination, headache, vision changes, focal and/or lateralizing sensory or motor deficits.  Note that while  patient was in the emergency department, he had an episode of atrial fibrillation with rapid ventricular response; patient denies any prior history of A-fib or other arrhythmias.     ED Course (Summary):   Vitals on presentation: T101.3, /74, , RR 28, SpO2 93% RA (dropped down to <90% and is currently on 2 L nasal cannula at 94%)  Labs: CBC with WBC 24.5 and left shift, Hgb 14.3, platelets 708.  CMP with glucose 125, sodium 132, potassium 4.0, bicarb 20, BUN 15, serum creatinine 0.88.  Magnesium 1.69.  BNP 25.  High-sensitivity troponin 13.  Lactate 1.8.  UA: Trace leukocyte esterase, 21-50 WBCs.  Imaging: CT PE and CT abdomen pelvis with IV contrast is notable for right middle lobe infiltrate and probable dependent changes in the right lung base, no acute pathologies in the abdomen and pelvis; there is evidence of chronic emphysematous disease in the chest  Interventions: 1 L normal saline, 1 L LR, vancomycin/Zosyn, 975 mg Tylenol, 2 g IV magnesium     5/14: Patient seen.  Remains on 3 L O2 by NC.  Uses no oxygen at rest.  No prior history of COPD or asthma.  States he had 1 prior episode of pneumonia.  Still has a cough, thinks he has mucus but has been unable to mobilize.  Will order a flutter valve.  Patient is a lobar pneumonia, suspect pneumococcal but urinary antigens were negative.  Continue to treat empirically.  Reassess in AM.  Continue IV antibiotics and wean O2 as able.     5/15: Patient seen.  Weaned to 2 L O2 by NC.  Developed episodes of atrial fibrillation yesterday.  Started on anticoagulation.  Consult placed to cardiology.  Checking magnesium, if okay will start amiodarone.  Switching to apixaban today.  Continue antibiotic therapy, patient appears to be improving.  Ordered a flutter valve.  Discussed with patient and family in the room.     5/16: Patient seen.  Still feels better but remains on 2 L.  Continue increased dose of Cardizem and apixaban.  Magnesium low and was replaced.   Continue antibiotic therapy another 24 hours and reassess.  May need O2 on discharge.  WBC count remains elevated.      5/17: Patient seen.  Remains on 2 L.  WBC count is trending down.  Overall patient feels improved.  Less cough.  Heart rate has remained stable without amiodarone.  Discussed with cardiology, will discharge with apixaban.  Patient to follow-up with cardiology as outpatient.  Needs follow-up chest x-ray in 3 to 4 weeks to ensure clearing of pneumonia.  Follow-up PCP in 1 to 2 weeks.  Check for home O2 but does not qualify at this time.  Weaned to room air at the time of discharge.    This discharge took greater than 35 minutes to arrange.    * Sepsis due to pneumonia (Multi)  Assessment & Plan  -SIRS criteria (4/4)   -Source: Pulm (see imaging results in HPI and results section) and  (UA: Trace leukocyte esterase, 21-50 WBCs, remainder pending at this time)  -End-organ dysfunction: hypoxia, transient atrial fibrillation with RVR (new arrhythmia)  -Lactate 1.8  -BP is normotensive. Has gotten 2L IVF.   -Blood cultures x2. Urine cx. MRSA nares. Urine antigens. Sputum culture.   -Continue antibiotic coverage with Ceftriaxone 2g q24h and Doxycycline 100mg BID (reactions to ciprofloxacin and azithromycin in the past)  -Follow fever curve, WBCs   5/14: RML on CT scan.  Urinary antigens negative. Continue empiric treatment. Marked leukocytosis.  Continue current antibiotics.  Suspect pneumococcal pneumonia based on presentation.  5/15: Seems a little better, WBC improved. Remains on 3L.  Weaned to 2 L today.  Continue antibiotic therapy.  5/16: Remains on 2 L.  WBC count still elevated.  Continue flutter valve.  Reassess in AM.  5/17: White blood cell count down to 15.9.  Remains on 2 L.  Weaned to room air, no longer requires O2 for discharge.  Follow-up chest x-ray in 3 to 4 weeks.  Follow-up PCP in 1 to 2 weeks.    Low magnesium level  Assessment & Plan  Replace and recheck.  5/17:  Resolved.    Hypokalemia  Assessment & Plan  Replace and recheck.  5/17: Resolved.    Acute hypoxic respiratory failure (Multi)  Assessment & Plan  -Currently requiring 2 L nasal cannula to maintain >90%  -Not documented how low he dropped but will continue to follow and wean O2 as tolerated  -Suspect secondary to above    Essential hypertension  Assessment & Plan  -Patient will be continued on home diltiazem in the morning, blood pressure is adequately controlled at this time  -Will continue to monitor overnight in setting of above  5/14: BP is a little bit low.  Monitor closely with medications for A-fib.    Acute cystitis without hematuria  Assessment & Plan  Continue antibiotics. Adjust as needed. Awaiting culture.    Atrial fibrillation with rapid ventricular response (Multi)  Assessment & Plan  -Patient with episode of RVR into the 180s while in the emergency department, resolved with 1 L LR and 2 g IV mag  -Was not symptomatic during this episode, had a normal troponin and BNP on presentation  -Suspect that this is likely secondary to his sepsis rather than an intrinsic process; however, his daughter mention that while he was asleep his heart rate did escalate back to the 160s following this but it was in the 90s and a sinus rhythm while I was examining the patient  -Out of abundance of caution, patient will be continued on telemetry.  Will have an echocardiogram obtained.  -He will be continued on his home aspirin, does take diltiazem daily so this will also be continued in the morning  -Will check electrolytes and TSH in the morning  -If recurrent, will need to likely involve cardiology for management recommendations  5/14: LVEF is 55 to 60%.  Was in NSR most of the day but just flipped back into A-fib.  Will give another dose of Lopressor.  Consider adding if blood pressure is adequate.  Place consult to cardiology.  Suspect secondary to his pneumonia. [id number] Score 3.  Will start him on  heparin.  5/15: HR very irregular last PM. Flipping between NSR, NSR with PACs, and what looks like afib. Often changes rapidly. On cardizem, has PRN beta blocker.  Seen by cardiology.  Will start amiodarone if magnesium levels appropriate.  Supplement otherwise.  Switching from heparin to apixaban.  5/16: Remains in NSR.  Did not require amiodarone.  Close follow-up with cardiology as outpatient.  Continue Cardizem and apixaban.  5/17: Remains in NSR.  No amiodarone is required.  Discharged on apixaban.  Precautions given to patient.  Follow-up cardiology as outpatient.    Tobacco abuse  Assessment & Plan  Longstanding tobacco use with suspected COPD/emphysema  -Patient with emphysematous changes on CT PE, has been smoking since age 18  -Slightly diminished throughout the left lung fields without overt wheezing  -Patient will be continued on DuoNebs while here, as needed albuterol MDI  -Discussed obtaining PFTs after discharge and tobacco cessation  -NRT offered while admitted    Peptic reflux disease  Assessment & Plan  -Continue PPI while admitted    Enlarged prostate with lower urinary tract symptoms (LUTS)  Assessment & Plan  -Continued on home meds / flomax   -Continued outpatient follow-up with Urology (Dr. Ferrell) as scheduled     Cervical disc disorder  Assessment & Plan  -Continued outpatient follow-up as scheduled          Pertinent Physical Exam At Time of Discharge  Physical Exam  HENT:      Head: Normocephalic and atraumatic.      Nose: Nose normal. No congestion or rhinorrhea.      Mouth/Throat:      Mouth: Mucous membranes are dry.      Pharynx: Oropharynx is clear.   Eyes:      General: No scleral icterus.     Extraocular Movements: Extraocular movements intact.      Pupils: Pupils are equal, round, and reactive to light.   Cardiovascular:      Rate and Rhythm: Normal rate and regular rhythm.      Heart sounds: Normal heart sounds. No murmur heard.     No friction rub. No gallop.   Pulmonary:       Effort: Pulmonary effort is normal.      Breath sounds: Examination of the right-upper field reveals rhonchi. Examination of the right-middle field reveals rhonchi. Rhonchi present. No wheezing or rales.   Chest:      Chest wall: No tenderness.   Abdominal:      General: There is no distension.      Palpations: Abdomen is soft.      Tenderness: There is no abdominal tenderness. There is no guarding or rebound.   Musculoskeletal:         General: No swelling, tenderness or signs of injury. Normal range of motion.      Cervical back: Normal range of motion.   Skin:     General: Skin is warm and dry.      Coloration: Skin is not jaundiced.      Findings: No bruising, erythema or rash.      Comments: Seborrheic keratoses on the back predominantly   Neurological:      General: No focal deficit present.      Mental Status: He is oriented to person, place, and time.         Outpatient Follow-Up  Future Appointments   Date Time Provider Department [place]   8/2/2024 10:15 AM Js Ferrell MD [facility]   9/25/2024  3:00 PM Mook Porter MD [id number]         Js Orta MD

## 2024-05-17 NOTE — CARE PLAN
The clinical goals for the shift include Patient to remain free of falls or injury throughout shift.      Problem: Pain  Goal: My pain/discomfort is manageable  Outcome: Progressing     Problem: Safety  Goal: Patient will be injury free during hospitalization  Outcome: Progressing  Goal: I will remain free of falls  Outcome: Progressing     Problem: Daily Care  Goal: Daily care needs are met  Outcome: Progressing     Problem: Psychosocial Needs  Goal: Demonstrates ability to cope with hospitalization/illness  Outcome: Progressing  Goal: Collaborate with me, my family, and caregiver to identify my specific goals  Outcome: Progressing     Problem: Discharge Barriers  Goal: My discharge needs are met  Outcome: Progressing     Problem: Fall/Injury  Goal: Not fall by end of shift  Outcome: Progressing  Goal: Be free from injury by end of the shift  Outcome: Progressing  Goal: Verbalize understanding of personal risk factors for fall in the hospital  Outcome: Progressing  Goal: Verbalize understanding of risk factor reduction measures to prevent injury from fall in the home  Outcome: Progressing  Goal: Use assistive devices by end of the shift  Outcome: Progressing  Goal: Pace activities to prevent fatigue by end of the shift  Outcome: Progressing     Problem: Pain - Adult  Goal: Verbalizes/displays adequate comfort level or baseline comfort level  Outcome: Progressing     Problem: Safety - Adult  Goal: Free from fall injury  Outcome: Progressing     Problem: Discharge Planning  Goal: Discharge to home or other facility with appropriate resources  Outcome: Progressing     Problem: Chronic Conditions and Co-morbidities  Goal: Patient's chronic conditions and co-morbidity symptoms are monitored and maintained or improved  Outcome: Progressing

## 2024-05-22 ENCOUNTER — LAB (OUTPATIENT)
Dept: LAB | Facility: LAB | Age: 79
End: 2024-05-22
Payer: MEDICARE

## 2024-05-22 ENCOUNTER — OFFICE VISIT (OUTPATIENT)
Dept: PRIMARY CARE | Facility: CLINIC | Age: 79
End: 2024-05-22
Payer: MEDICARE

## 2024-05-22 VITALS
SYSTOLIC BLOOD PRESSURE: 146 MMHG | BODY MASS INDEX: 22.2 KG/M2 | HEART RATE: 112 BPM | WEIGHT: 146 LBS | OXYGEN SATURATION: 92 % | DIASTOLIC BLOOD PRESSURE: 81 MMHG | TEMPERATURE: 97.8 F

## 2024-05-22 DIAGNOSIS — E87.6 HYPOKALEMIA: ICD-10-CM

## 2024-05-22 DIAGNOSIS — J18.9 PNEUMONIA DUE TO INFECTIOUS ORGANISM, UNSPECIFIED LATERALITY, UNSPECIFIED PART OF LUNG: ICD-10-CM

## 2024-05-22 DIAGNOSIS — I48.91 ATRIAL FIBRILLATION, UNSPECIFIED TYPE (MULTI): ICD-10-CM

## 2024-05-22 DIAGNOSIS — J18.9 PNEUMONIA DUE TO INFECTIOUS ORGANISM, UNSPECIFIED LATERALITY, UNSPECIFIED PART OF LUNG: Primary | ICD-10-CM

## 2024-05-22 LAB
ALBUMIN SERPL BCP-MCNC: 3.2 G/DL (ref 3.4–5)
ALP SERPL-CCNC: 121 U/L (ref 33–136)
ALT SERPL W P-5'-P-CCNC: 39 U/L (ref 10–52)
ANION GAP SERPL CALC-SCNC: 12 MMOL/L (ref 10–20)
AST SERPL W P-5'-P-CCNC: 33 U/L (ref 9–39)
ATRIAL RATE: 124 BPM
ATRIAL RATE: 185 BPM
ATRIAL RATE: 96 BPM
BASOPHILS # BLD AUTO: 0.12 X10*3/UL (ref 0–0.1)
BASOPHILS NFR BLD AUTO: 0.7 %
BILIRUB SERPL-MCNC: 0.5 MG/DL (ref 0–1.2)
BUN SERPL-MCNC: 11 MG/DL (ref 6–23)
CALCIUM SERPL-MCNC: 8.7 MG/DL (ref 8.6–10.3)
CHLORIDE SERPL-SCNC: 99 MMOL/L (ref 98–107)
CO2 SERPL-SCNC: 25 MMOL/L (ref 21–32)
CREAT SERPL-MCNC: 0.73 MG/DL (ref 0.5–1.3)
EGFRCR SERPLBLD CKD-EPI 2021: >90 ML/MIN/1.73M*2
EOSINOPHIL # BLD AUTO: 0.28 X10*3/UL (ref 0–0.4)
EOSINOPHIL NFR BLD AUTO: 1.5 %
ERYTHROCYTE [DISTWIDTH] IN BLOOD BY AUTOMATED COUNT: 13.4 % (ref 11.5–14.5)
GLUCOSE SERPL-MCNC: 105 MG/DL (ref 74–99)
HCT VFR BLD AUTO: 40.2 % (ref 41–52)
HGB BLD-MCNC: 13 G/DL (ref 13.5–17.5)
IMM GRANULOCYTES # BLD AUTO: 0.17 X10*3/UL (ref 0–0.5)
IMM GRANULOCYTES NFR BLD AUTO: 0.9 % (ref 0–0.9)
LYMPHOCYTES # BLD AUTO: 7.28 X10*3/UL (ref 0.8–3)
LYMPHOCYTES NFR BLD AUTO: 40 %
MAGNESIUM SERPL-MCNC: 1.97 MG/DL (ref 1.6–2.4)
MCH RBC QN AUTO: 31.5 PG (ref 26–34)
MCHC RBC AUTO-ENTMCNC: 32.3 G/DL (ref 32–36)
MCV RBC AUTO: 97 FL (ref 80–100)
MONOCYTES # BLD AUTO: 1.14 X10*3/UL (ref 0.05–0.8)
MONOCYTES NFR BLD AUTO: 6.3 %
NEUTROPHILS # BLD AUTO: 9.2 X10*3/UL (ref 1.6–5.5)
NEUTROPHILS NFR BLD AUTO: 50.6 %
NRBC BLD-RTO: 0 /100 WBCS (ref 0–0)
P AXIS: 29 DEGREES
P AXIS: 53 DEGREES
P AXIS: 69 DEGREES
PLATELET # BLD AUTO: 795 X10*3/UL (ref 150–450)
POTASSIUM SERPL-SCNC: 4.4 MMOL/L (ref 3.5–5.3)
PR INTERVAL: 120 MS
PR INTERVAL: 134 MS
PR INTERVAL: 67 MS
PROT SERPL-MCNC: 6.4 G/DL (ref 6.4–8.2)
Q ONSET: 251 MS
Q ONSET: 251 MS
Q ONSET: 254 MS
QRS COUNT: 16 BEATS
QRS COUNT: 19 BEATS
QRS COUNT: 30 BEATS
QRS DURATION: 118 MS
QRS DURATION: 87 MS
QRS DURATION: 88 MS
QT INTERVAL: 299 MS
QT INTERVAL: 329 MS
QT INTERVAL: 339 MS
QTC CALCULATION(BAZETT): 429 MS
QTC CALCULATION(BAZETT): 473 MS
QTC CALCULATION(BAZETT): 524 MS
QTC FREDERICIA: 396 MS
QTC FREDERICIA: 418 MS
QTC FREDERICIA: 434 MS
R AXIS: -4 DEGREES
R AXIS: 242 DEGREES
R AXIS: 33 DEGREES
RBC # BLD AUTO: 4.13 X10*6/UL (ref 4.5–5.9)
RBC MORPH BLD: NORMAL
SODIUM SERPL-SCNC: 132 MMOL/L (ref 136–145)
T AXIS: 52 DEGREES
T AXIS: 57 DEGREES
T AXIS: 66 DEGREES
T OFFSET: 401 MS
T OFFSET: 419 MS
T OFFSET: 421 MS
VENTRICULAR RATE: 124 BPM
VENTRICULAR RATE: 184 BPM
VENTRICULAR RATE: 96 BPM
WBC # BLD AUTO: 18.2 X10*3/UL (ref 4.4–11.3)

## 2024-05-22 PROCEDURE — 85025 COMPLETE CBC W/AUTO DIFF WBC: CPT

## 2024-05-22 PROCEDURE — 36415 COLL VENOUS BLD VENIPUNCTURE: CPT

## 2024-05-22 PROCEDURE — 83735 ASSAY OF MAGNESIUM: CPT

## 2024-05-22 PROCEDURE — 3077F SYST BP >= 140 MM HG: CPT | Performed by: FAMILY MEDICINE

## 2024-05-22 PROCEDURE — 99214 OFFICE O/P EST MOD 30 MIN: CPT | Performed by: FAMILY MEDICINE

## 2024-05-22 PROCEDURE — 1111F DSCHRG MED/CURRENT MED MERGE: CPT | Performed by: FAMILY MEDICINE

## 2024-05-22 PROCEDURE — 80053 COMPREHEN METABOLIC PANEL: CPT

## 2024-05-22 PROCEDURE — 3079F DIAST BP 80-89 MM HG: CPT | Performed by: FAMILY MEDICINE

## 2024-05-22 PROCEDURE — 1159F MED LIST DOCD IN RCRD: CPT | Performed by: FAMILY MEDICINE

## 2024-05-22 NOTE — PROGRESS NOTES
Subjective   Patient ID: Cedric Mohan is a 79 y.o. male who presents for Hospital Follow-up (Pneumonia).  HPI  Patient was admitted to the hospital with sepsis due to pneumonia   Diagnosed with Afib during his stay , and was started on eliquis , he is already on diltiazem.   Quit smoking since the hospital   Feeling better   Not short of breath   He is setting up appointment with the cardiologist.  Heart rate normal , at home ,  Eliquis for new onset Afib, Dr. Jose Good MD   Drinking water     Review of Systems    Past Medical History:   Diagnosis Date    Other conditions influencing health status     Arthritis    Other conditions influencing health status     Bladder Cancer    Other conditions influencing health status     Prostate Cancer       Past Surgical History:   Procedure Laterality Date    HERNIA REPAIR  07/05/2013    Hernia Repair    HERNIA REPAIR  06/11/2013    Hernia Repair    OTHER SURGICAL HISTORY  05/23/2013    General Surgery    OTHER SURGICAL HISTORY  03/17/2017    Sialodochoplasty    OTHER SURGICAL HISTORY  06/11/2013    Tonsillectomy Over Age 13    OTHER SURGICAL HISTORY  06/11/2013    Cystoscopy With Resection Of Tumor      Social History     Socioeconomic History    Marital status:      Spouse name: Not on file    Number of children: Not on file    Years of education: Not on file    Highest education level: Not on file   Occupational History    Not on file   Tobacco Use    Smoking status: Every Day     Current packs/day: 1.00     Types: Cigarettes     Passive exposure: Current    Smokeless tobacco: Never   Substance and Sexual Activity    Alcohol use: Never    Drug use: Never    Sexual activity: Not on file   Other Topics Concern    Not on file   Social History Narrative    Not on file     Social Determinants of Health     Financial Resource Strain: Low Risk  (5/15/2024)    Overall Financial Resource Strain (CARDIA)     Difficulty of Paying Living Expenses: Not hard at  all   Food Insecurity: Not on file   Transportation Needs: No Transportation Needs (5/15/2024)    PRAPARE - Transportation     Lack of Transportation (Medical): No     Lack of Transportation (Non-Medical): No   Physical Activity: Not on file   Stress: Not on file   Social Connections: Not on file   Intimate Partner Violence: Not on file   Housing Stability: Low Risk  (5/15/2024)    Housing Stability Vital Sign     Unable to Pay for Housing in the Last Year: No     Number of Places Lived in the Last Year: 1     Unstable Housing in the Last Year: No      Family History   Problem Relation Name Age of Onset    Breast cancer Mother      Other (stroke syndrome) Mother      Prostate cancer Brother         MEDICATIONS AND ALLERGIES:    ALLERGIES Ciprofloxacin, Droxy, Alfuzosin, Azithromycin, and Tramadol    MEDICATIONS   Current Outpatient Medications on File Prior to Visit   Medication Sig Dispense Refill    albuterol 90 mcg/actuation inhaler Inhale 2 puffs every 6 hours if needed for wheezing or shortness of breath. 20.1 g 11    apixaban (Eliquis) 5 mg tablet Take 1 tablet (5 mg) by mouth every 12 hours. 180 tablet 0    Arthritis Pain Relief, acetam, 650 mg ER tablet Take 2 tablets (1,300 mg) by mouth 2 times a day as needed.      aspirin 81 mg chewable tablet Chew 1 tablet (81 mg) once daily.      dilTIAZem CD (Cardizem CD) 240 mg 24 hr capsule Take 1 capsule (240 mg) by mouth once daily. 90 capsule 1    esomeprazole (NexIUM) 40 mg DR capsule Take 1 capsule (40 mg) by mouth once daily in the morning. Take before meals. Do not open capsule. 90 capsule 1    fluticasone (Flonase) 50 mcg/actuation nasal spray Administer 1 spray into each nostril once daily. Shake gently. Before first use, prime pump. After use, clean tip and replace cap. 16 g 3    multivitamin tablet Take 1 tablet by mouth once daily.      tamsulosin (Flomax) 0.4 mg 24 hr capsule Take 1 capsule (0.4 mg) by mouth once daily. Daily before bed 90 capsule 3     [] cefdinir (Omnicef) 300 mg capsule Take 1 capsule (300 mg) by mouth 2 times a day for 4 days. 8 capsule 0    [] doxycycline (Vibramycin) 100 mg capsule Take 1 capsule (100 mg) by mouth 2 times a day for 4 days. Take with at least 8 ounces (large glass) of water, do not lie down for 30 minutes after 8 capsule 0     No current facility-administered medications on file prior to visit.        Objective   Visit Vitals  /81   Pulse (!) 112   Temp 36.6 °C (97.8 °F)   Smoking Status Every Day          2024     3:22 PM 2024     7:07 PM 2024    11:10 PM 2024     3:30 AM 2024     7:28 AM 2024    12:03 PM 2024     1:09 PM   Vitals   Systolic 126 133 125 120 129 130 146   Diastolic 78 77 70 72 74 77 81   Heart Rate 80 106 87 83 85 91 112   Temp 36.4 °C (97.6 °F) 36.8 °C (98.2 °F) 36.7 °C (98 °F) 36.6 °C (97.8 °F) 36.3 °C (97.3 °F) 36.3 °C (97.4 °F) 36.6 °C (97.8 °F)   Resp 18 20 20 20 18 18    Weight (lb)    141.98      BMI    21.59 kg/m2      BSA (m2)    1.76 m2      Visit Report       Report     Physical Exam  Constitutional:       Comments: pale   HENT:      Head: Normocephalic.      Nose: No congestion.   Eyes:      Pupils: Pupils are equal, round, and reactive to light.   Cardiovascular:      Rate and Rhythm: Normal rate and regular rhythm.      Heart sounds: No murmur heard.  Pulmonary:      Effort: No respiratory distress.      Breath sounds: Normal breath sounds. No stridor.       1. Pneumonia due to infectious organism, unspecified laterality, unspecified part of lung  Recovering well   O2 sat normal   Will recheck lab sbelow   He will need repeat Chest Xray   Order placed in chart   - CBC and Auto Differential; Future  - Comprehensive metabolic panel; Future  - XR chest 2 views; Future    2. Hypokalemia  Will check the labs below   - CBC and Auto Differential; Future  - Comprehensive metabolic panel; Future  - XR chest 2 views; Future  - Magnesium; Future    3.  Atrial fibrillation, unspecified type (Multi)  Heart rate normal during the exam down to 88   He is on eliquis   Already on diltiazem   Scheduled with cardiology   Feelign well , advised to contact us for any changes.

## 2024-05-23 DIAGNOSIS — D72.829 LEUKOCYTOSIS, UNSPECIFIED TYPE: Primary | ICD-10-CM

## 2024-05-24 ENCOUNTER — TELEPHONE (OUTPATIENT)
Dept: PRIMARY CARE | Facility: CLINIC | Age: 79
End: 2024-05-24
Payer: MEDICARE

## 2024-05-28 ENCOUNTER — HOSPITAL ENCOUNTER (OUTPATIENT)
Dept: RADIOLOGY | Facility: HOSPITAL | Age: 79
Discharge: HOME | End: 2024-05-28
Payer: MEDICARE

## 2024-05-28 ENCOUNTER — LAB (OUTPATIENT)
Dept: LAB | Facility: LAB | Age: 79
End: 2024-05-28
Payer: MEDICARE

## 2024-05-28 DIAGNOSIS — J18.9 PNEUMONIA DUE TO INFECTIOUS ORGANISM, UNSPECIFIED LATERALITY, UNSPECIFIED PART OF LUNG: ICD-10-CM

## 2024-05-28 DIAGNOSIS — D72.829 LEUKOCYTOSIS, UNSPECIFIED TYPE: ICD-10-CM

## 2024-05-28 DIAGNOSIS — E87.6 HYPOKALEMIA: ICD-10-CM

## 2024-05-28 LAB
BASOPHILS # BLD AUTO: 0.13 X10*3/UL (ref 0–0.1)
BASOPHILS NFR BLD AUTO: 0.8 %
EOSINOPHIL # BLD AUTO: 0.26 X10*3/UL (ref 0–0.4)
EOSINOPHIL NFR BLD AUTO: 1.7 %
ERYTHROCYTE [DISTWIDTH] IN BLOOD BY AUTOMATED COUNT: 13.5 % (ref 11.5–14.5)
HCT VFR BLD AUTO: 39.1 % (ref 41–52)
HGB BLD-MCNC: 12.9 G/DL (ref 13.5–17.5)
IMM GRANULOCYTES # BLD AUTO: 0.12 X10*3/UL (ref 0–0.5)
IMM GRANULOCYTES NFR BLD AUTO: 0.8 % (ref 0–0.9)
LYMPHOCYTES # BLD AUTO: 5.32 X10*3/UL (ref 0.8–3)
LYMPHOCYTES NFR BLD AUTO: 34.6 %
MCH RBC QN AUTO: 32.3 PG (ref 26–34)
MCHC RBC AUTO-ENTMCNC: 33 G/DL (ref 32–36)
MCV RBC AUTO: 98 FL (ref 80–100)
MONOCYTES # BLD AUTO: 1.01 X10*3/UL (ref 0.05–0.8)
MONOCYTES NFR BLD AUTO: 6.6 %
NEUTROPHILS # BLD AUTO: 8.52 X10*3/UL (ref 1.6–5.5)
NEUTROPHILS NFR BLD AUTO: 55.5 %
NRBC BLD-RTO: 0 /100 WBCS (ref 0–0)
PLATELET # BLD AUTO: 640 X10*3/UL (ref 150–450)
RBC # BLD AUTO: 4 X10*6/UL (ref 4.5–5.9)
WBC # BLD AUTO: 15.4 X10*3/UL (ref 4.4–11.3)

## 2024-05-28 PROCEDURE — 71046 X-RAY EXAM CHEST 2 VIEWS: CPT | Performed by: RADIOLOGY

## 2024-05-28 PROCEDURE — 85025 COMPLETE CBC W/AUTO DIFF WBC: CPT

## 2024-05-28 PROCEDURE — 36415 COLL VENOUS BLD VENIPUNCTURE: CPT

## 2024-05-28 PROCEDURE — 71046 X-RAY EXAM CHEST 2 VIEWS: CPT

## 2024-05-30 ENCOUNTER — TELEPHONE (OUTPATIENT)
Dept: PRIMARY CARE | Facility: CLINIC | Age: 79
End: 2024-05-30
Payer: MEDICARE

## 2024-05-30 DIAGNOSIS — J18.9 PNEUMONIA DUE TO INFECTIOUS ORGANISM, UNSPECIFIED LATERALITY, UNSPECIFIED PART OF LUNG: Primary | ICD-10-CM

## 2024-05-30 NOTE — TELEPHONE ENCOUNTER
----- Message from Mook Martinez MD sent at 5/30/2024 12:36 PM EDT -----  White blood cells slightly decreased  from 18.2 to 15.4 , which is still high , should be less than 11.2  Again recommend close follow up if he is still having symptoms.

## 2024-06-05 NOTE — PROGRESS NOTES
Primary Cardiologist: Dr. Good    Chief Complaint:   No chief complaint on file.     History Of Present Illness:    Cedric Mohan is a 79 y.o. male with past medical history of HTN, tobacco use disorder (no dx COPD, no PFTs), BPH with LUTS, bladder cancer s/p TURBT and mitomycin intravesical instillation (01/2023) who recently presented with shortness of breath, productive cough (clear sputum), congestion, decreased appetite, and general malaise progressively worsening x ~3 weeks. While patient was in the ED he had an episode of Afib RVR which was a new diagnosis and spontaneously converted to SR. Patient was started on Diltiazem 240 mg daily and Apixaban 5 mg BID.        Today, Cedric Mohan is feeling    Last Recorded Vitals:  Visit Vitals  Smoking Status Every Day        Past Medical History:  He has a past medical history of Other conditions influencing health status, Other conditions influencing health status, and Other conditions influencing health status.    Past Surgical History:  He has a past surgical history that includes Other surgical history (05/23/2013); Hernia repair (07/05/2013); Other surgical history (03/17/2017); Hernia repair (06/11/2013); Other surgical history (06/11/2013); and Other surgical history (06/11/2013).      Social History:  He reports that he has been smoking cigarettes. He has been exposed to tobacco smoke. He has never used smokeless tobacco. He reports that he does not drink alcohol and does not use drugs.    Family History:  Family History   Problem Relation Name Age of Onset    Breast cancer Mother      Other (stroke syndrome) Mother      Prostate cancer Brother          Allergies:  Ciprofloxacin, Droxy, Alfuzosin, Azithromycin, and Tramadol    Outpatient Medications:  Current Outpatient Medications   Medication Instructions    albuterol 90 mcg/actuation inhaler 2 puffs, inhalation, Every 6 hours PRN    Arthritis Pain Relief (acetam) 1,300 mg, oral, 2 times daily PRN     aspirin 81 mg, oral, Daily    dilTIAZem CD (CARDIZEM CD) 240 mg, oral, Daily    Eliquis 5 mg, oral, Every 12 hours    esomeprazole (NEXIUM) 40 mg, oral, Daily before breakfast, Do not open capsule.    fluticasone (Flonase) 50 mcg/actuation nasal spray 1 spray, Each Nostril, Daily, Shake gently. Before first use, prime pump. After use, clean tip and replace cap.    multivitamin tablet 1 tablet, oral, Daily    tamsulosin (FLOMAX) 0.4 mg, oral, Daily, Daily before bed         ROS     Physical Exam      Last Labs:  CBC -  Lab Results   Component Value Date    WBC 15.4 (H) 05/28/2024    HGB 12.9 (L) 05/28/2024    HCT 39.1 (L) 05/28/2024    MCV 98 05/28/2024     (H) 05/28/2024       CMP -  Lab Results   Component Value Date    CALCIUM 8.7 05/22/2024    PROT 6.4 05/22/2024    ALBUMIN 3.2 (L) 05/22/2024    AST 33 05/22/2024    ALT 39 05/22/2024    ALKPHOS 121 05/22/2024    BILITOT 0.5 05/22/2024       LIPID PANEL -   Lab Results   Component Value Date    CHOL 220 (H) 10/03/2023    TRIG 162 (H) 10/03/2023    HDL 52.0 10/03/2023    CHHDL 4.2 10/03/2023    LDLF 116 (H) 10/10/2022    VLDL 32 10/03/2023    NHDL 168 (H) 10/03/2023       RENAL FUNCTION PANEL -   Lab Results   Component Value Date    GLUCOSE 105 (H) 05/22/2024     (L) 05/22/2024    K 4.4 05/22/2024    CL 99 05/22/2024    CO2 25 05/22/2024    ANIONGAP 12 05/22/2024    BUN 11 05/22/2024    CREATININE 0.73 05/22/2024    GFRMALE 75 09/13/2023    CALCIUM 8.7 05/22/2024    ALBUMIN 3.2 (L) 05/22/2024        Lab Results   Component Value Date    BNP 25 05/13/2024       Last Cardiology Tests:  ECG:  No results found for this or any previous visit from the past 1095 days.      Echo:  TTE 5/14/24:   Left ventricular systolic function is normal with a 55-60% estimated ejection fraction.   2. Spectral Doppler shows an impaired relaxation pattern of left ventricular diastolic filling.   3. There is low normal right ventricular systolic function.  Ejection  "Fractions:  No results found for: \"EF\"    Cath:  No results found for this or any previous visit from the past 1095 days.      Stress Test:      Cardiac Imaging:  No results found for this or any previous visit from the past 1095 days.        Lab review: I have personally reviewed the laboratory result(s)     Assessment/Plan   {Assess/Plan SmartLinks:12675}  Cedric Mohan is a 79 y.o. male with past medical history of HTN, tobacco use disorder (no dx COPD, no PFTs), BPH with LUTS, bladder cancer s/p TURBT and mitomycin intravesical instillation (01/2023) who recently presented with shortness of breath, productive cough (clear sputum), congestion, decreased appetite, and general malaise progressively worsening x ~3 weeks. While patient was in the ED he had an episode of Afib RVR which was a new diagnosis and spontaneously converted to SR. Patient was started on Diltiazem 240 mg daily and Apixaban 5 mg BID.     pAF  Recent new diagnosis in setting of sepsis from PNA, hypomagnesemia  Converted to SR spontaneously   ECG today:  Today,   [id number] score of 3 and patient was started on Apixaban 5 mg BID  Continue rate control with Diltiazem 240 mg daily  Holter ?    Hypertension  Today's BP        Radhika Conley, APRN-CNP   "

## 2024-06-06 ENCOUNTER — HOSPITAL ENCOUNTER (EMERGENCY)
Facility: HOSPITAL | Age: 79
Discharge: HOME | End: 2024-06-06
Attending: EMERGENCY MEDICINE
Payer: MEDICARE

## 2024-06-06 VITALS
SYSTOLIC BLOOD PRESSURE: 131 MMHG | WEIGHT: 145 LBS | TEMPERATURE: 98 F | HEART RATE: 86 BPM | HEIGHT: 68 IN | BODY MASS INDEX: 21.98 KG/M2 | RESPIRATION RATE: 16 BRPM | OXYGEN SATURATION: 95 % | DIASTOLIC BLOOD PRESSURE: 75 MMHG

## 2024-06-06 DIAGNOSIS — R31.0 GROSS HEMATURIA: Primary | ICD-10-CM

## 2024-06-06 DIAGNOSIS — Z92.29 HISTORY OF ANTICOAGULANT THERAPY: ICD-10-CM

## 2024-06-06 LAB
ALBUMIN SERPL BCP-MCNC: 3.3 G/DL (ref 3.4–5)
ALP SERPL-CCNC: 100 U/L (ref 33–136)
ALT SERPL W P-5'-P-CCNC: 11 U/L (ref 10–52)
ANION GAP SERPL CALC-SCNC: 12 MMOL/L (ref 10–20)
APPEARANCE UR: ABNORMAL
AST SERPL W P-5'-P-CCNC: 11 U/L (ref 9–39)
BASOPHILS # BLD AUTO: 0.1 X10*3/UL (ref 0–0.1)
BASOPHILS NFR BLD AUTO: 0.7 %
BILIRUB SERPL-MCNC: 0.3 MG/DL (ref 0–1.2)
BILIRUB UR STRIP.AUTO-MCNC: NEGATIVE MG/DL
BUN SERPL-MCNC: 16 MG/DL (ref 6–23)
CALCIUM SERPL-MCNC: 8.7 MG/DL (ref 8.6–10.3)
CHLORIDE SERPL-SCNC: 102 MMOL/L (ref 98–107)
CO2 SERPL-SCNC: 24 MMOL/L (ref 21–32)
COLOR UR: ABNORMAL
CREAT SERPL-MCNC: 0.91 MG/DL (ref 0.5–1.3)
EGFRCR SERPLBLD CKD-EPI 2021: 86 ML/MIN/1.73M*2
EOSINOPHIL # BLD AUTO: 0.47 X10*3/UL (ref 0–0.4)
EOSINOPHIL NFR BLD AUTO: 3.3 %
ERYTHROCYTE [DISTWIDTH] IN BLOOD BY AUTOMATED COUNT: 14 % (ref 11.5–14.5)
GLUCOSE SERPL-MCNC: 112 MG/DL (ref 74–99)
GLUCOSE UR STRIP.AUTO-MCNC: NEGATIVE MG/DL
HCT VFR BLD AUTO: 38.1 % (ref 41–52)
HGB BLD-MCNC: 12.8 G/DL (ref 13.5–17.5)
HYALINE CASTS #/AREA URNS AUTO: ABNORMAL /LPF
IMM GRANULOCYTES # BLD AUTO: 0.07 X10*3/UL (ref 0–0.5)
IMM GRANULOCYTES NFR BLD AUTO: 0.5 % (ref 0–0.9)
INR PPP: 1.3 (ref 0.9–1.1)
KETONES UR STRIP.AUTO-MCNC: NEGATIVE MG/DL
LACTATE SERPL-SCNC: 1.4 MMOL/L (ref 0.4–2)
LEUKOCYTE ESTERASE UR QL STRIP.AUTO: NEGATIVE
LYMPHOCYTES # BLD AUTO: 4.15 X10*3/UL (ref 0.8–3)
LYMPHOCYTES NFR BLD AUTO: 29 %
MCH RBC QN AUTO: 32.5 PG (ref 26–34)
MCHC RBC AUTO-ENTMCNC: 33.6 G/DL (ref 32–36)
MCV RBC AUTO: 97 FL (ref 80–100)
MONOCYTES # BLD AUTO: 1.15 X10*3/UL (ref 0.05–0.8)
MONOCYTES NFR BLD AUTO: 8 %
MUCOUS THREADS #/AREA URNS AUTO: ABNORMAL /LPF
NEUTROPHILS # BLD AUTO: 8.36 X10*3/UL (ref 1.6–5.5)
NEUTROPHILS NFR BLD AUTO: 58.5 %
NITRITE UR QL STRIP.AUTO: NEGATIVE
NRBC BLD-RTO: 0 /100 WBCS (ref 0–0)
PH UR STRIP.AUTO: 6 [PH]
PLATELET # BLD AUTO: 355 X10*3/UL (ref 150–450)
POTASSIUM SERPL-SCNC: 4.4 MMOL/L (ref 3.5–5.3)
PROT SERPL-MCNC: 6.8 G/DL (ref 6.4–8.2)
PROT UR STRIP.AUTO-MCNC: ABNORMAL MG/DL
PROTHROMBIN TIME: 14.2 SECONDS (ref 9.8–12.8)
RBC # BLD AUTO: 3.94 X10*6/UL (ref 4.5–5.9)
RBC # UR STRIP.AUTO: ABNORMAL /UL
RBC #/AREA URNS AUTO: >20 /HPF
SODIUM SERPL-SCNC: 134 MMOL/L (ref 136–145)
SP GR UR STRIP.AUTO: >1.03
UROBILINOGEN UR STRIP.AUTO-MCNC: ABNORMAL MG/DL
WBC # BLD AUTO: 14.3 X10*3/UL (ref 4.4–11.3)
WBC #/AREA URNS AUTO: ABNORMAL /HPF

## 2024-06-06 PROCEDURE — 36415 COLL VENOUS BLD VENIPUNCTURE: CPT | Performed by: PHYSICIAN ASSISTANT

## 2024-06-06 PROCEDURE — 80053 COMPREHEN METABOLIC PANEL: CPT | Performed by: PHYSICIAN ASSISTANT

## 2024-06-06 PROCEDURE — 81003 URINALYSIS AUTO W/O SCOPE: CPT | Performed by: PHYSICIAN ASSISTANT

## 2024-06-06 PROCEDURE — 99283 EMERGENCY DEPT VISIT LOW MDM: CPT

## 2024-06-06 PROCEDURE — 83605 ASSAY OF LACTIC ACID: CPT | Performed by: PHYSICIAN ASSISTANT

## 2024-06-06 PROCEDURE — 85610 PROTHROMBIN TIME: CPT | Performed by: PHYSICIAN ASSISTANT

## 2024-06-06 PROCEDURE — 85025 COMPLETE CBC W/AUTO DIFF WBC: CPT | Performed by: PHYSICIAN ASSISTANT

## 2024-06-06 ASSESSMENT — LIFESTYLE VARIABLES
EVER FELT BAD OR GUILTY ABOUT YOUR DRINKING: NO
EVER HAD A DRINK FIRST THING IN THE MORNING TO STEADY YOUR NERVES TO GET RID OF A HANGOVER: NO
TOTAL SCORE: 0
HAVE PEOPLE ANNOYED YOU BY CRITICIZING YOUR DRINKING: NO
HAVE YOU EVER FELT YOU SHOULD CUT DOWN ON YOUR DRINKING: NO

## 2024-06-06 ASSESSMENT — PAIN - FUNCTIONAL ASSESSMENT: PAIN_FUNCTIONAL_ASSESSMENT: 0-10

## 2024-06-06 ASSESSMENT — PAIN SCALES - GENERAL: PAINLEVEL_OUTOF10: 0 - NO PAIN

## 2024-06-07 ENCOUNTER — APPOINTMENT (OUTPATIENT)
Dept: CARDIOLOGY | Facility: CLINIC | Age: 79
End: 2024-06-07
Payer: MEDICARE

## 2024-06-07 LAB — HOLD SPECIMEN: NORMAL

## 2024-06-07 NOTE — ED PROVIDER NOTES
HPI   Chief Complaint   Patient presents with    Blood in Urine     Pt reports hematuria since 2pm today, denies any pain or other urinary symptoms       History of present illness:  79-year-old male presents the emergency room for complaints of blood in his urine.  The patient states he has a history of known bladder cancer.  He states he recently had procedure back in March of this year to have a mass removed from the inside of his bladder.  He states that he has not had issues since then and he states that he was resubmitted to the hospital couple weeks ago for pneumonia and unfortunately developed A-fib.  He states he was started on Eliquis at that time and discharged home.  He states that he has been doing fine until this morning when he Began passing blood clots and passing lots of blood in his urine.  He denies any fever chills chest pain shortness of breath nausea vomiting lower abdominal pain or back pain.  He denies any other symptoms at this time.    Social history: Negative for alcohol and drug use.    Review of systems:   Gen.: No weight loss, fatigue, anorexia, insomnia, fever.   Eyes: No vision loss, double vision  ENT: No pharyngitis, neck pain  Cardiac: No chest pain, palpitations, syncope, near syncope.   Pulmonary: No shortness of breath, cough, hemoptysis.   Heme/lymph: No swollen glands, fever, bleeding.   GI: No abdominal pain, change in bowel habits, melena, hematemesis, hematochezia, nausea, vomiting, diarrhea.   : No discharge, dysuria, frequency, urgency  Musculoskeletal: No limb pain, joint pain, joint swelling.   Skin: No rashes.   Review of systems is otherwise negative unless stated above or in history of present illness.      Physical exam:  General: Vitals noted, no distress. Afebrile.   EENT: No lymphadenopathy appreciated  Cardiac: Regular, rate, rhythm, no murmur.   Pulmonary: Lungs clear bilaterally with good aeration. No adventitious breath sounds.   Abdomen: Soft, nonsurgical.  Nontender. No peritoneal signs. Normoactive bowel sounds.   Extremities: No peripheral edema.   Skin: No rash.   Neuro: No focal neurologic deficits      Medical decision making:   Testing: CBC CMP urinalysis: Testing shows chronic leukocytosis, urinalysis shows large amounts of red blood cells but no obvious UTI, further testing is pending at this time  Plan: 79-year-old male presents the emergency room for complaints of blood in his urine.  The patient states he has a history of known bladder cancer.  He states he recently had procedure back in March of this year to have a mass removed from the inside of his bladder.  He states that he has not had issues since then and he states that he was resubmitted to the hospital couple weeks ago for pneumonia and unfortunately developed A-fib.  He states he was started on Eliquis at that time and discharged home.  He states that he has been doing fine until this morning when he Began passing blood clots and passing lots of blood in his urine.  He denies any fever chills chest pain shortness of breath nausea vomiting lower abdominal pain or back pain.  He denies any other symptoms at this time.  No pain palpation across the abdomen, no palpable mass present, no pulsatile mass present, normal active bowel sounds throughout.  Lungs are clear to auscultation throughout normal S1-S2 heart sounds appreciated.  I explained to the patient that I be handing over care to the attending physician at this time pending further test results and he is agreeable this plan.  I explained to the attending physician as well the current test results.  Impression:   1.  Hematuria            History provided by:  Patient   used: No                        Breonna Coma Scale Score: 15                     Patient History   Past Medical History:   Diagnosis Date    Other conditions influencing health status     Arthritis    Other conditions influencing health status     Bladder  Cancer    Other conditions influencing health status     Prostate Cancer     Past Surgical History:   Procedure Laterality Date    HERNIA REPAIR  07/05/2013    Hernia Repair    HERNIA REPAIR  06/11/2013    Hernia Repair    OTHER SURGICAL HISTORY  05/23/2013    General Surgery    OTHER SURGICAL HISTORY  03/17/2017    Sialodochoplasty    OTHER SURGICAL HISTORY  06/11/2013    Tonsillectomy Over Age 13    OTHER SURGICAL HISTORY  06/11/2013    Cystoscopy With Resection Of Tumor     Family History   Problem Relation Name Age of Onset    Breast cancer Mother      Other (stroke syndrome) Mother      Prostate cancer Brother       Social History     Tobacco Use    Smoking status: Every Day     Current packs/day: 1.00     Types: Cigarettes     Passive exposure: Current    Smokeless tobacco: Never   Substance Use Topics    Alcohol use: Never    Drug use: Never       Physical Exam   ED Triage Vitals [06/06/24 2016]   Temperature Heart Rate Respirations BP   36.7 °C (98 °F) 86 16 131/75      Pulse Ox Temp Source Heart Rate Source Patient Position   95 % Skin Monitor --      BP Location FiO2 (%)     -- --       Physical Exam    ED Course & MDM   ED Course as of 06/07/24 2323   Thu Jun 06, 2024 2204 Reviewed patient discharge summary from May 17 to 20, 2024 where he was treated for pneumonia had A-fib, started on Eliquis.  Was also treated for antibiotics for UTI. [WJ]   2204 HEMOGLOBIN(!): 12.8  Patient hemoglobin was stable at 12.8 today compared to 9 days ago 12.9 [WJ]      ED Course User Index  [WJ] Robbie Mayfield DO         Diagnoses as of 06/07/24 2323   Gross hematuria   History of anticoagulant therapy       Medical Decision Making      Procedure  Procedures     Mook Mendez PA-C  06/07/24 2325

## 2024-06-11 ENCOUNTER — HOSPITAL ENCOUNTER (OUTPATIENT)
Dept: RADIOLOGY | Facility: HOSPITAL | Age: 79
Discharge: HOME | End: 2024-06-11
Payer: MEDICARE

## 2024-06-11 DIAGNOSIS — J18.9 PNEUMONIA DUE TO INFECTIOUS ORGANISM, UNSPECIFIED LATERALITY, UNSPECIFIED PART OF LUNG: ICD-10-CM

## 2024-06-11 PROCEDURE — 71046 X-RAY EXAM CHEST 2 VIEWS: CPT | Performed by: RADIOLOGY

## 2024-06-11 PROCEDURE — 71046 X-RAY EXAM CHEST 2 VIEWS: CPT

## 2024-06-13 ENCOUNTER — TELEPHONE (OUTPATIENT)
Dept: PRIMARY CARE | Facility: CLINIC | Age: 79
End: 2024-06-13
Payer: MEDICARE

## 2024-06-13 DIAGNOSIS — R93.89 ABNORMAL CHEST X-RAY: Primary | ICD-10-CM

## 2024-06-13 NOTE — TELEPHONE ENCOUNTER
Patient advised  Also wondering if xray is still showing a touch of pneumonia if he can have more medicine sent in to help with it?

## 2024-06-13 NOTE — TELEPHONE ENCOUNTER
----- Message from Mook Martinez MD sent at 6/13/2024  3:52 PM EDT -----  No significant changes on the Xray , recommendation is for a CT scan , will put order in chart.

## 2024-06-14 PROBLEM — M47.812 CERVICAL SPONDYLOSIS WITHOUT MYELOPATHY: Status: ACTIVE | Noted: 2024-06-14

## 2024-06-14 PROBLEM — C67.9 MALIGNANT NEOPLASM OF URINARY BLADDER (MULTI): Status: ACTIVE | Noted: 2022-12-14

## 2024-06-14 PROBLEM — N30.40 IRRADIATION CYSTITIS: Status: ACTIVE | Noted: 2022-10-28

## 2024-06-14 PROBLEM — I10 ESSENTIAL (PRIMARY) HYPERTENSION: Status: ACTIVE | Noted: 2023-01-11

## 2024-06-14 RX ORDER — TRAMADOL HYDROCHLORIDE 50 MG/1
TABLET ORAL
COMMUNITY
End: 2024-06-19 | Stop reason: WASHOUT

## 2024-06-14 RX ORDER — MV-MIN/FOLIC/K1/LYCOPEN/LUTEIN 300-60 MCG
TABLET ORAL
COMMUNITY
End: 2024-06-19 | Stop reason: WASHOUT

## 2024-06-17 ENCOUNTER — APPOINTMENT (OUTPATIENT)
Dept: PRIMARY CARE | Facility: CLINIC | Age: 79
End: 2024-06-17
Payer: MEDICARE

## 2024-06-17 ENCOUNTER — LAB (OUTPATIENT)
Dept: LAB | Facility: LAB | Age: 79
End: 2024-06-17
Payer: MEDICARE

## 2024-06-17 VITALS
SYSTOLIC BLOOD PRESSURE: 127 MMHG | DIASTOLIC BLOOD PRESSURE: 73 MMHG | WEIGHT: 141 LBS | HEART RATE: 98 BPM | HEIGHT: 68 IN | RESPIRATION RATE: 16 BRPM | OXYGEN SATURATION: 92 % | BODY MASS INDEX: 21.37 KG/M2 | TEMPERATURE: 96.9 F

## 2024-06-17 DIAGNOSIS — J44.9 CHRONIC OBSTRUCTIVE PULMONARY DISEASE, UNSPECIFIED COPD TYPE (MULTI): ICD-10-CM

## 2024-06-17 DIAGNOSIS — R31.9 HEMATURIA, UNSPECIFIED TYPE: Primary | ICD-10-CM

## 2024-06-17 DIAGNOSIS — R31.9 HEMATURIA, UNSPECIFIED TYPE: ICD-10-CM

## 2024-06-17 PROCEDURE — 99213 OFFICE O/P EST LOW 20 MIN: CPT | Performed by: FAMILY MEDICINE

## 2024-06-17 PROCEDURE — 3078F DIAST BP <80 MM HG: CPT | Performed by: FAMILY MEDICINE

## 2024-06-17 PROCEDURE — 3074F SYST BP LT 130 MM HG: CPT | Performed by: FAMILY MEDICINE

## 2024-06-17 PROCEDURE — 85025 COMPLETE CBC W/AUTO DIFF WBC: CPT

## 2024-06-17 PROCEDURE — 36415 COLL VENOUS BLD VENIPUNCTURE: CPT

## 2024-06-17 RX ORDER — PREDNISONE 20 MG/1
40 TABLET ORAL DAILY
Qty: 10 TABLET | Refills: 0 | Status: SHIPPED | OUTPATIENT
Start: 2024-06-17 | End: 2024-06-22

## 2024-06-17 RX ORDER — CEFDINIR 300 MG/1
300 CAPSULE ORAL 2 TIMES DAILY
Qty: 14 CAPSULE | Refills: 0 | Status: SHIPPED | OUTPATIENT
Start: 2024-06-17 | End: 2024-06-24

## 2024-06-17 NOTE — PROGRESS NOTES
Subjective   Patient ID: Cedric Mohan is a 79 y.o. male who presents for Cough (Pneumonia Dx 1 month ago cough never left and it's hard for him to breathe especially while asleep ) and Imaging Results  (X Ray ).  HPI  Patient has been coughing for 1 week   Patient is still taking eliquis   But having hematuria , he Is scheduled to see his cardiologist, he missed the first apointment because he had to go to the ED For hematuria.   Now he is only taking hte eliquis once daily   Was scheduled to see the cardiologist on 11th and went to the emergency   The cough got better, then it got worse   When he sent him from the hospital , they send him with 4 days of doxycyline.   He is long term smoker , quite after his last hospilization when he was diagnosed with pneumonia and Afib   Chest Xray not showing changes, recommendation is for chest CT scan , patient has CT last year showing.     Emphysema: Advanced centrilobular emphysema including one or two   large bulla at the right apex         Review of Systems    Past Medical History:   Diagnosis Date    Other conditions influencing health status     Arthritis    Other conditions influencing health status     Bladder Cancer    Other conditions influencing health status     Prostate Cancer       Past Surgical History:   Procedure Laterality Date    HERNIA REPAIR  07/05/2013    Hernia Repair    HERNIA REPAIR  06/11/2013    Hernia Repair    OTHER SURGICAL HISTORY  05/23/2013    General Surgery    OTHER SURGICAL HISTORY  03/17/2017    Sialodochoplasty    OTHER SURGICAL HISTORY  06/11/2013    Tonsillectomy Over Age 13    OTHER SURGICAL HISTORY  06/11/2013    Cystoscopy With Resection Of Tumor      Social History     Socioeconomic History    Marital status:      Spouse name: Not on file    Number of children: Not on file    Years of education: Not on file    Highest education level: Not on file   Occupational History    Not on file   Tobacco Use    Smoking status: Every  Day     Current packs/day: 1.00     Types: Cigarettes     Passive exposure: Current    Smokeless tobacco: Never   Substance and Sexual Activity    Alcohol use: Never    Drug use: Never    Sexual activity: Not on file   Other Topics Concern    Not on file   Social History Narrative    Not on file     Social Determinants of Health     Financial Resource Strain: Low Risk  (5/15/2024)    Overall Financial Resource Strain (CARDIA)     Difficulty of Paying Living Expenses: Not hard at all   Food Insecurity: Not on file   Transportation Needs: No Transportation Needs (5/15/2024)    PRAPARE - Transportation     Lack of Transportation (Medical): No     Lack of Transportation (Non-Medical): No   Physical Activity: Not on file   Stress: Not on file   Social Connections: Not on file   Intimate Partner Violence: Not on file   Housing Stability: Low Risk  (5/15/2024)    Housing Stability Vital Sign     Unable to Pay for Housing in the Last Year: No     Number of Places Lived in the Last Year: 1     Unstable Housing in the Last Year: No      Family History   Problem Relation Name Age of Onset    Breast cancer Mother      Other (stroke syndrome) Mother      Prostate cancer Brother         MEDICATIONS AND ALLERGIES:    ALLERGIES Ciprofloxacin, Droxy, Alfuzosin, Azithromycin, and Tramadol    MEDICATIONS   Current Outpatient Medications on File Prior to Visit   Medication Sig Dispense Refill    albuterol 90 mcg/actuation inhaler Inhale 2 puffs every 6 hours if needed for wheezing or shortness of breath. 20.1 g 11    apixaban (Eliquis) 5 mg tablet Take 1 tablet (5 mg) by mouth every 12 hours. 180 tablet 0    Arthritis Pain Relief, acetam, 650 mg ER tablet Take 2 tablets (1,300 mg) by mouth 2 times a day as needed.      aspirin 81 mg chewable tablet Chew 1 tablet (81 mg) once daily.      dilTIAZem CD (Cardizem CD) 240 mg 24 hr capsule Take 1 capsule (240 mg) by mouth once daily. 90 capsule 1    esomeprazole (NexIUM) 40 mg DR capsule  "Take 1 capsule (40 mg) by mouth once daily in the morning. Take before meals. Do not open capsule. 90 capsule 1    fluticasone (Flonase) 50 mcg/actuation nasal spray Administer 1 spray into each nostril once daily. Shake gently. Before first use, prime pump. After use, clean tip and replace cap. 16 g 3    multivitamin tablet Take 1 tablet by mouth once daily.      tamsulosin (Flomax) 0.4 mg 24 hr capsule Take 1 capsule (0.4 mg) by mouth once daily. Daily before bed 90 capsule 3     No current facility-administered medications on file prior to visit.              Objective   Visit Vitals  /73 (BP Location: Left arm, Patient Position: Sitting, BP Cuff Size: Adult)   Pulse 98   Temp 36.1 °C (96.9 °F) (Temporal)   Resp 16   Ht 1.727 m (5' 8\")   Wt 64 kg (141 lb)   SpO2 92%   BMI 21.44 kg/m²   Smoking Status Every Day   BSA 1.75 m²          5/16/2024    11:10 PM 5/17/2024     3:30 AM 5/17/2024     7:28 AM 5/17/2024    12:03 PM 5/22/2024     1:09 PM 6/6/2024     8:16 PM 6/17/2024     3:34 PM   Vitals   Systolic 125 120 129 130 146 131 127   Diastolic 70 72 74 77 81 75 73   Heart Rate 87 83 85 91 112 86 98   Temp 36.7 °C (98 °F) 36.6 °C (97.8 °F) 36.3 °C (97.3 °F) 36.3 °C (97.4 °F) 36.6 °C (97.8 °F) 36.7 °C (98 °F) 36.1 °C (96.9 °F)   Resp 20 20 18 18  16 16   Height (in)      1.727 m (5' 8\") 1.727 m (5' 8\")   Weight (lb)  141.98   146 145 141   BMI  21.59 kg/m2   22.2 kg/m2 22.05 kg/m2 21.44 kg/m2   BSA (m2)  1.76 m2   1.78 m2 1.78 m2 1.75 m2   Visit Report     Report  Report     Physical Exam      Lungs clear today   Heart rate normal , irregular rhythm     1. Hematuria, unspecified type  Still having hematuria   Will check CBC   Advised to hold the eliquis till he follows with cardiology   Advised to go to the ED for bleeding   We explaiend risk of strokes off anticoagulation   However he is having hematuria with every urination , he is known to have bladder cancer, he wanted to hold eliquis for now due to the " bleeding.   - CBC and Auto Differential; Future  - predniSONE (Deltasone) 20 mg tablet; Take 2 tablets (40 mg) by mouth once daily for 5 days.  Dispense: 10 tablet; Refill: 0  - cefdinir (Omnicef) 300 mg capsule; Take 1 capsule (300 mg) by mouth 2 times a day for 7 days.  Dispense: 14 capsule; Refill: 0    2. Chronic obstructive pulmonary disease, unspecified COPD type (Multi)  Will treat with prednisone   Advised pulm referral ,however he politely refused stating that he has a lot to follow up   CT scan was ordered for follow up on chest Xray.   - predniSONE (Deltasone) 20 mg tablet; Take 2 tablets (40 mg) by mouth once daily for 5 days.  Dispense: 10 tablet; Refill: 0  - cefdinir (Omnicef) 300 mg capsule; Take 1 capsule (300 mg) by mouth 2 times a day for 7 days.  Dispense: 14 capsule; Refill: 0

## 2024-06-18 LAB
BASOPHILS # BLD AUTO: 0.09 X10*3/UL (ref 0–0.1)
BASOPHILS NFR BLD AUTO: 0.6 %
EOSINOPHIL # BLD AUTO: 0.52 X10*3/UL (ref 0–0.4)
EOSINOPHIL NFR BLD AUTO: 3.3 %
ERYTHROCYTE [DISTWIDTH] IN BLOOD BY AUTOMATED COUNT: 14 % (ref 11.5–14.5)
HCT VFR BLD AUTO: 36.3 % (ref 41–52)
HGB BLD-MCNC: 11.5 G/DL (ref 13.5–17.5)
IMM GRANULOCYTES # BLD AUTO: 0.08 X10*3/UL (ref 0–0.5)
IMM GRANULOCYTES NFR BLD AUTO: 0.5 % (ref 0–0.9)
LYMPHOCYTES # BLD AUTO: 4.64 X10*3/UL (ref 0.8–3)
LYMPHOCYTES NFR BLD AUTO: 29.6 %
MCH RBC QN AUTO: 31.9 PG (ref 26–34)
MCHC RBC AUTO-ENTMCNC: 31.7 G/DL (ref 32–36)
MCV RBC AUTO: 101 FL (ref 80–100)
MONOCYTES # BLD AUTO: 1.31 X10*3/UL (ref 0.05–0.8)
MONOCYTES NFR BLD AUTO: 8.3 %
NEUTROPHILS # BLD AUTO: 9.05 X10*3/UL (ref 1.6–5.5)
NEUTROPHILS NFR BLD AUTO: 57.7 %
NRBC BLD-RTO: 0 /100 WBCS (ref 0–0)
PLATELET # BLD AUTO: 439 X10*3/UL (ref 150–450)
RBC # BLD AUTO: 3.61 X10*6/UL (ref 4.5–5.9)
WBC # BLD AUTO: 15.7 X10*3/UL (ref 4.4–11.3)

## 2024-06-18 NOTE — PROGRESS NOTES
"Primary Cardiologist: Dr. Good    Chief Complaint:   Hospital Follow-up     History Of Present Illness:    Cedric Mohan is a 79 y.o. male with past medical history of HTN, tobacco use disorder (no dx COPD, no PFTs), BPH with LUTS, bladder cancer s/p TURBT and mitomycin intravesical instillation (01/2023) who recently presented with shortness of breath, productive cough (clear sputum), congestion, decreased appetite, and general malaise progressively worsening x ~3 weeks. While patient was in the ED he had an episode of Afib RVR which was a new diagnosis and spontaneously converted to SR. Patient was started on Diltiazem 240 mg daily and Apixaban 5 mg BID.     Today, Cedric Mohan is feeling improved since hospitalization. Patient denies chest pain or pressure, no shortness of breath, no dizziness or lightheadedness, no palpitations, no dizziness or lightheadedness and no lower extremity edema. He reports that last week he developed significant hematuria and his PCP stopped his Apixaban and a referral was placed for urology.     Last Recorded Vitals:  Visit Vitals  /74 (BP Location: Left arm, Patient Position: Sitting, BP Cuff Size: Adult)   Pulse 80   Ht 1.727 m (5' 8\")   Wt 64.3 kg (141 lb 12.8 oz)   SpO2 97%   BMI 21.56 kg/m²   Smoking Status Former   BSA 1.76 m²        Past Medical History:  He has a past medical history of Other conditions influencing health status, Other conditions influencing health status, and Other conditions influencing health status.    Past Surgical History:  He has a past surgical history that includes Other surgical history (05/23/2013); Hernia repair (07/05/2013); Other surgical history (03/17/2017); Hernia repair (06/11/2013); Other surgical history (06/11/2013); and Other surgical history (06/11/2013).      Social History:  He reports that he has quit smoking. His smoking use included cigarettes. He has been exposed to tobacco smoke. He has never used smokeless " tobacco. He reports that he does not drink alcohol and does not use drugs.    Family History:  Family History   Problem Relation Name Age of Onset    Breast cancer Mother      Other (stroke syndrome) Mother      Prostate cancer Brother          Allergies:  Ciprofloxacin, Droxy, Alfuzosin, Azithromycin, and Tramadol    Outpatient Medications:  Current Outpatient Medications   Medication Instructions    albuterol 90 mcg/actuation inhaler 2 puffs, inhalation, Every 6 hours PRN    apixaban (ELIQUIS) 5 mg, oral, Every 12 hours    aspirin 81 mg, oral, Daily    cefdinir (OMNICEF) 300 mg, oral, 2 times daily    cyanocobalamin, vitamin B-12, (VITAMIN B-12 ORAL) oral    dilTIAZem CD (CARDIZEM CD) 240 mg, oral, Daily    esomeprazole (NEXIUM) 40 mg, oral, Daily before breakfast, Do not open capsule.    fluticasone (Flonase) 50 mcg/actuation nasal spray 1 spray, Each Nostril, Daily, Shake gently. Before first use, prime pump. After use, clean tip and replace cap.    multivitamin tablet 1 tablet, oral, Daily    predniSONE (DELTASONE) 40 mg, oral, Daily    tamsulosin (FLOMAX) 0.4 mg, oral, Daily, Daily before bed         Review of Systems   Constitutional: Negative for malaise/fatigue.   HENT: Negative.     Eyes: Negative.    Cardiovascular:  Negative for chest pain, dyspnea on exertion, leg swelling, near-syncope, palpitations and syncope.   Respiratory:  Negative for shortness of breath.    Hematologic/Lymphatic: Bruises/bleeds easily.   Skin: Negative.    Gastrointestinal: Negative.    Genitourinary:  Positive for hematuria.   Neurological:  Negative for dizziness and light-headedness.        Physical Exam  Vitals reviewed.   Constitutional:       Appearance: Normal appearance.   HENT:      Head: Normocephalic.      Mouth/Throat:      Mouth: Mucous membranes are moist.   Cardiovascular:      Rate and Rhythm: Normal rate and regular rhythm.      Pulses: Normal pulses.      Heart sounds: Normal heart sounds.   Pulmonary:       Effort: Pulmonary effort is normal.      Breath sounds: Normal breath sounds. No wheezing, rhonchi or rales.   Abdominal:      General: Bowel sounds are normal. There is no distension.      Palpations: Abdomen is soft.      Tenderness: There is no abdominal tenderness.   Musculoskeletal:      Cervical back: Normal range of motion.      Right lower leg: No edema.      Left lower leg: No edema.   Skin:     General: Skin is warm and dry.   Neurological:      General: No focal deficit present.      Mental Status: He is alert and oriented to person, place, and time.   Psychiatric:         Mood and Affect: Mood normal.         Behavior: Behavior normal.           Last Labs:  CBC -  Lab Results   Component Value Date    WBC 15.7 (H) 06/17/2024    HGB 11.5 (L) 06/17/2024    HCT 36.3 (L) 06/17/2024     (H) 06/17/2024     06/17/2024       CMP -  Lab Results   Component Value Date    CALCIUM 8.7 06/06/2024    PROT 6.8 06/06/2024    ALBUMIN 3.3 (L) 06/06/2024    AST 11 06/06/2024    ALT 11 06/06/2024    ALKPHOS 100 06/06/2024    BILITOT 0.3 06/06/2024       LIPID PANEL -   Lab Results   Component Value Date    CHOL 220 (H) 10/03/2023    TRIG 162 (H) 10/03/2023    HDL 52.0 10/03/2023    CHHDL 4.2 10/03/2023    LDLF 116 (H) 10/10/2022    VLDL 32 10/03/2023    NHDL 168 (H) 10/03/2023       RENAL FUNCTION PANEL -   Lab Results   Component Value Date    GLUCOSE 112 (H) 06/06/2024     (L) 06/06/2024    K 4.4 06/06/2024     06/06/2024    CO2 24 06/06/2024    ANIONGAP 12 06/06/2024    BUN 16 06/06/2024    CREATININE 0.91 06/06/2024    GFRMALE 75 09/13/2023    CALCIUM 8.7 06/06/2024    ALBUMIN 3.3 (L) 06/06/2024        Lab Results   Component Value Date    BNP 25 05/13/2024       Last Cardiology Tests:  ECG:  Normal Sinus Rhythm with rate of 97 bpm, qt/qtc 340/431 ms.       Echo:  TTE 5/14/24:   Left ventricular systolic function is normal with a 55-60% estimated ejection fraction.   2. Spectral Doppler shows  "an impaired relaxation pattern of left ventricular diastolic filling.   3. There is low normal right ventricular systolic function.  Ejection Fractions:  No results found for: \"EF\"    Cath:  No results found for this or any previous visit from the past 1095 days.      Stress Test:      Cardiac Imaging:  No results found for this or any previous visit from the past 1095 days.        Lab review: I have personally reviewed the laboratory result(s)     Assessment/Plan     Cedric Mohan is a 79 y.o. male with past medical history of HTN, tobacco use disorder (no dx COPD, no PFTs), BPH with LUTS, bladder cancer s/p TURBT and mitomycin intravesical instillation (01/2023) who recently presented with shortness of breath, productive cough (clear sputum), congestion, decreased appetite, and general malaise progressively worsening x ~3 weeks. While patient was in the ED he had an episode of Afib RVR which was a new diagnosis and spontaneously converted to SR. Patient was started on Diltiazem 240 mg daily and Apixaban 5 mg BID.     pAF  Recent new diagnosis in setting of sepsis from PNA, hypomagnesemia  Converted to SR spontaneously but had brief recurrent episodes during hospitalization.  TTE 5/14/24:  Left ventricular systolic function is normal with a 55-60% estimated ejection fraction. Spectral Doppler shows an impaired relaxation pattern of left ventricular diastolic filling.  ECG today: Normal Sinus rhythm with rate of 97 bpm  Today, denies palpitations but reports he was asymptomatic with Afib during hospitalization.   [id number] score of 3 and patient was started on Apixaban 5 mg BID. Patient tells me he had recent episode of \"significant hematuria\" and his PCP told him to stop Apixaban. He is going to follow up with urology. He verbalized understanding of his risk of stroke off of Apixaban and recommendations would be to resume as his H/H stable and no further bleeding. He reports he understands risk of holding but " he will not start back until he discusses further with urology.    Will place 14 day Holter monitor on patient today to evaluate for recurrence/burden.   Continue rate control with Diltiazem 240 mg daily, patient is unsure if he is taking. I recommend he start taking as ordered.     Hypertension  Today's /74  Elevated today, will resume Diltiazem as directed and will monitor BP        Radhika Conley, APRN-CNP

## 2024-06-19 ENCOUNTER — APPOINTMENT (OUTPATIENT)
Dept: CARDIOLOGY | Facility: CLINIC | Age: 79
End: 2024-06-19
Payer: MEDICARE

## 2024-06-19 ENCOUNTER — ANCILLARY PROCEDURE (OUTPATIENT)
Dept: CARDIOLOGY | Facility: CLINIC | Age: 79
End: 2024-06-19
Payer: MEDICARE

## 2024-06-19 VITALS
DIASTOLIC BLOOD PRESSURE: 74 MMHG | WEIGHT: 141.8 LBS | OXYGEN SATURATION: 97 % | SYSTOLIC BLOOD PRESSURE: 144 MMHG | BODY MASS INDEX: 21.49 KG/M2 | HEART RATE: 80 BPM | HEIGHT: 68 IN

## 2024-06-19 DIAGNOSIS — I10 ESSENTIAL (PRIMARY) HYPERTENSION: ICD-10-CM

## 2024-06-19 DIAGNOSIS — R31.29 MICROSCOPIC HEMATURIA: ICD-10-CM

## 2024-06-19 DIAGNOSIS — I48.91 ATRIAL FIBRILLATION WITH RAPID VENTRICULAR RESPONSE (MULTI): ICD-10-CM

## 2024-06-19 DIAGNOSIS — I48.91 ATRIAL FIBRILLATION WITH RAPID VENTRICULAR RESPONSE (MULTI): Primary | ICD-10-CM

## 2024-06-19 LAB — BODY SURFACE AREA: 1.76 M2

## 2024-06-19 PROCEDURE — 1160F RVW MEDS BY RX/DR IN RCRD: CPT | Performed by: CLINICAL NURSE SPECIALIST

## 2024-06-19 PROCEDURE — 99214 OFFICE O/P EST MOD 30 MIN: CPT | Performed by: CLINICAL NURSE SPECIALIST

## 2024-06-19 PROCEDURE — 4004F PT TOBACCO SCREEN RCVD TLK: CPT | Performed by: CLINICAL NURSE SPECIALIST

## 2024-06-19 PROCEDURE — 3077F SYST BP >= 140 MM HG: CPT | Performed by: CLINICAL NURSE SPECIALIST

## 2024-06-19 PROCEDURE — 93000 ELECTROCARDIOGRAM COMPLETE: CPT | Performed by: INTERNAL MEDICINE

## 2024-06-19 PROCEDURE — 1159F MED LIST DOCD IN RCRD: CPT | Performed by: CLINICAL NURSE SPECIALIST

## 2024-06-19 PROCEDURE — 3078F DIAST BP <80 MM HG: CPT | Performed by: CLINICAL NURSE SPECIALIST

## 2024-06-19 ASSESSMENT — ENCOUNTER SYMPTOMS
DIZZINESS: 0
BRUISES/BLEEDS EASILY: 1
SYNCOPE: 0
NEAR-SYNCOPE: 0
LIGHT-HEADEDNESS: 0
HEMATURIA: 1
GASTROINTESTINAL NEGATIVE: 1
EYES NEGATIVE: 1
SHORTNESS OF BREATH: 0
DYSPNEA ON EXERTION: 0
PALPITATIONS: 0

## 2024-06-19 NOTE — PATIENT INSTRUCTIONS
A Holter monitor will be placed on you today to monitor for recurrence of atrial fibrillation. Please wear for 14 days and return as directed.   Recommend to take Diltiazem 240 mg as prescribed in hospital.   Recommend to start taking Apixaban 5 mg twice daily, please discuss with urologist when okay to resume.   Call our office with any new cardiac concerns  Continue current medications  Continue heart-healthy diet. A diet low in sodium, low in cholesterol, limiting red meats and eating whole foods.   Follow up with Dr. Good in 3 months

## 2024-06-20 ENCOUNTER — TELEPHONE (OUTPATIENT)
Dept: PRIMARY CARE | Facility: CLINIC | Age: 79
End: 2024-06-20
Payer: MEDICARE

## 2024-06-20 NOTE — TELEPHONE ENCOUNTER
----- Message from Mook Martinez MD sent at 6/20/2024  2:30 PM EDT -----  Slight drop in blood counts, if the bleeding stopped he can try to restart eliquis.   Please follow with cariology and urology

## 2024-06-28 ENCOUNTER — HOSPITAL ENCOUNTER (OUTPATIENT)
Dept: RADIOLOGY | Facility: HOSPITAL | Age: 79
Discharge: HOME | End: 2024-06-28
Payer: MEDICARE

## 2024-06-28 DIAGNOSIS — R93.89 ABNORMAL CHEST X-RAY: ICD-10-CM

## 2024-06-28 PROCEDURE — 71250 CT THORAX DX C-: CPT

## 2024-07-03 ENCOUNTER — TELEPHONE (OUTPATIENT)
Dept: PRIMARY CARE | Facility: CLINIC | Age: 79
End: 2024-07-03
Payer: MEDICARE

## 2024-07-03 NOTE — TELEPHONE ENCOUNTER
----- Message from Mook Martinez MD sent at 7/2/2024 10:53 PM EDT -----  Ct scan showed improvement in the Right upper upper lobe findings , indicating that it was most likely related to pneumonia,    There is still a lymph node that is most likely related to the pneumonia   He has evidence of calcification in the heart arteries.   And findings suggestive of COPD     Follow with cardiology and Dr. Porter

## 2024-07-16 LAB — BODY SURFACE AREA: 1.76 M2

## 2024-07-17 DIAGNOSIS — C67.9 MALIGNANT NEOPLASM OF URINARY BLADDER, UNSPECIFIED SITE (MULTI): ICD-10-CM

## 2024-07-17 DIAGNOSIS — M50.90 CERVICAL DISC DISORDER: ICD-10-CM

## 2024-07-17 DIAGNOSIS — Z72.0 TOBACCO ABUSE: ICD-10-CM

## 2024-07-17 DIAGNOSIS — I10 HYPERTENSION, UNSPECIFIED TYPE: ICD-10-CM

## 2024-07-17 RX ORDER — DILTIAZEM HYDROCHLORIDE 300 MG/1
300 CAPSULE, COATED, EXTENDED RELEASE ORAL DAILY
Qty: 30 CAPSULE | Refills: 11 | Status: SHIPPED | OUTPATIENT
Start: 2024-07-17 | End: 2025-07-17

## 2024-07-17 NOTE — PROGRESS NOTES
"Called patient to discuss the results of his Holter monitor. We discussed that he did have episodes of Afib with <1% burden. He reports that he will not take Eliquis as ordered because he is having intermittent hematuria and he does not want to bleed to death. I discussed the risks associated with not taking Apixaban as ordered, he verbalized understanding and reports \"I still refuse\".   Will increase Diltiazem to 300 mg for fast heart rates on monitor, he verbalized understanding.   "

## 2024-08-02 ENCOUNTER — APPOINTMENT (OUTPATIENT)
Dept: UROLOGY | Facility: CLINIC | Age: 79
End: 2024-08-02
Payer: MEDICARE

## 2024-08-02 VITALS
SYSTOLIC BLOOD PRESSURE: 132 MMHG | HEART RATE: 108 BPM | HEIGHT: 68 IN | DIASTOLIC BLOOD PRESSURE: 73 MMHG | WEIGHT: 142 LBS | BODY MASS INDEX: 21.52 KG/M2

## 2024-08-02 DIAGNOSIS — R33.9 INCOMPLETE EMPTYING OF BLADDER: ICD-10-CM

## 2024-08-02 DIAGNOSIS — R33.9 URINARY RETENTION: ICD-10-CM

## 2024-08-02 DIAGNOSIS — C67.9 MALIGNANT NEOPLASM OF URINARY BLADDER, UNSPECIFIED SITE (MULTI): Primary | ICD-10-CM

## 2024-08-02 DIAGNOSIS — R31.0 GROSS HEMATURIA: ICD-10-CM

## 2024-08-02 LAB
POC APPEARANCE, URINE: NORMAL
POC BILIRUBIN, URINE: NORMAL
POC BLOOD, URINE: NORMAL
POC COLOR, URINE: NORMAL
POC GLUCOSE, URINE: NORMAL
POC KETONES, URINE: NORMAL
POC LEUKOCYTES, URINE: NORMAL
POC NITRITE,URINE: NORMAL
POC PH, URINE: NORMAL
POC PROTEIN, URINE: NORMAL
POC SPECIFIC GRAVITY, URINE: NORMAL
POC UROBILINOGEN, URINE: NORMAL

## 2024-08-02 PROCEDURE — 99213 OFFICE O/P EST LOW 20 MIN: CPT | Performed by: UROLOGY

## 2024-08-02 PROCEDURE — 81002 URINALYSIS NONAUTO W/O SCOPE: CPT | Performed by: UROLOGY

## 2024-08-02 ASSESSMENT — PAIN SCALES - GENERAL: PAINLEVEL: 1

## 2024-08-02 NOTE — PROGRESS NOTES
08/02/2024  Complaining gross hematuria with clots over the old month.  Off Eliquis for 1 month    A 18 Amharic coudé catheter was inserted, light pink urine now, no clots    We discussed history of bladder cancer, gross hematuria  We discussed short-term Isaac catheter, off Eliquis  We discussed post form cystoscopy  All the questions were answered, the patient expressed understanding and agreed to the plan.    Impression  Hematuria with clots  Bladder cancer  BPH    Plan  Isaac catheter to bag  Increase fluid intake  Cystoscopy next week  Continue Flomax 0.4 mg daily    04/12/2024  History of bladder cancer, 1 month post surveillance cystoscopy.  Voiding okay on Flomax 0.4 mg    Patient has no nausea, no vomiting, no fever.    YUKO: Deferred    PSA: Normal to updated    We discussed bladder cancer, surveillance cystoscopy every 3 to 4 months  We discussed benign prostate hypertrophy continue Flomax 0.4 mg daily, may consider 0.8 mg as well  We discussed PSA screening  All the questions were answered, the patient expressed understanding and agreed to the plan.    Impression  Bladder cancer  BPH    Plan  Cystoscopy in 4-month  Continue Flomax 0.4 mg daily  May consider 0.8 mg daily      I spent 25 minutes with this patient. Greater than 50% of this time was spent in counseling and/or coordination of care    No chief complaint on file.       Physical Exam     TODAYS LAB RESULTS:    PSA 09/13/2023  0.25    POC Glucose, Urine  NEGATIVE mg/dl NEGATIVE   POC Bilirubin, Urine  NEGATIVE NEGATIVE   POC Ketones, Urine  NEGATIVE mg/dl NEGATIVE   POC Specific Gravity, Urine  1.005 - 1.035 1.010   POC Blood, Urine  NEGATIVE NEGATIVE   POC PH, Urine  No Reference Range Established PH 6.5   POC Protein, Urine  NEGATIVE, 30 (1+) mg/dl NEGATIVE   POC Urobilinogen, Urine  0.2, 1.0 EU/DL 0.2   Poc Nitrite, Urine  NEGATIVE NEGATIVE   POC Leukocytes, Urine  NEGATIVE NEGATIVE     ASSESSMENT&PLAN:      IMPRESSIONS:       HPI  79 y.o. male  who presents for cystoscopic evaluation for bladder lesion Pt has a CT urography, conducted on 12/5/2023, revealed :      Interval resolution of previously visualized areas of soft tissue filling defects of the right-greater-than-left posterior urinary bladder wall without evidence of new abnormal filling defects along the urinary bladder wall on delayed phase imaging. New coarse calcifications within the posterior aspect of the urinary bladder lumen are favored to be secondary to patient's history of intravesical mitomycin treatment (likely treated disease).  Additionally, asymmetric urinary bladder wall thickening along the  right-greater-than-left posterior urinary bladder wall is also  favored to be secondary to posttreatment changes, although underlying  neoplasm can not be excluded. Attention on follow-up is recommended.        Review of Systems     All systems were reviewed. Anything negative was noted in the HPI.     Objective   Physical Exam     General: Well developed, well nourished, alert and cooperative, appears in no acute distress   Eyes: Non-injected conjunctiva, sclera clear, no proptosis   Cardiac: Extremities are warm and well perfused. No edema, cyanosis or pallor   Lungs: Breathing is easy, non-labored. Speaking in clear and complete sentences. Normal diaphragmatic movement   MSK: Ambulatory with steady gait, unassisted   Neuro: Alert and oriented to person, place, and time   Psych: Demonstrates good judgment and reason, without hallucinations, abnormal affect or abnormal behaviors   Skin: No obvious lesions, no rashes       No CVA tenderness bilaterally   No suprapubic pain or discomfort         Medical History        Past Medical History:   Diagnosis Date    Other conditions influencing health status       Arthritis    Other conditions influencing health status       Bladder Cancer    Other conditions influencing health status       Prostate Cancer               Surgical History         Past  Surgical History:   Procedure Laterality Date    HERNIA REPAIR   07/05/2013     Hernia Repair    HERNIA REPAIR   06/11/2013     Hernia Repair    OTHER SURGICAL HISTORY   05/23/2013     General Surgery    OTHER SURGICAL HISTORY   03/17/2017     Sialodochoplasty    OTHER SURGICAL HISTORY   06/11/2013     Tonsillectomy Over Age 13    OTHER SURGICAL HISTORY   06/11/2013     Cystoscopy With Resection Of Tumor         3/20/24 Dr. Marlow  Procedure:     The patient was prepped using a Betadine solution. Lidocaine jelly was instilled into the urethra. The flexible cystoscope was sterilely inserted into the urethra and formal cystoscopy performed in a systematic fashion. For detailed findings of the procedure, please see Dr. Marlow’s remarks below  Scope A used, Cipro 500 mg p.o. given           Assessment/Plan   Cystoscopic evaluation for Bladder lesion on CT     79 y.o. male who presents for the above condition, We had a very long and extensive discussion with the patient regarding the pathophysiology, differential diagnosis, risk factor, management, natural history, incidence and diagnostic work-up of the condition.      Surgical intervention will be scheduled to remove scar tissue and potentially send tissue for pathological examination to rule out malignancy     Plan:  - TURBT Vs observation and fu in 4 months with another cysto  - Pt wants to follow up his care at [place]           Surgery  7/19/2023 cystoscopy TURBT, mitomycin instillation dr Marlow  1/11/2023 TURBT and mitomycin intravesical instillation dr Marlow

## 2024-08-05 ENCOUNTER — TELEPHONE (OUTPATIENT)
Dept: UROLOGY | Facility: CLINIC | Age: 79
End: 2024-08-05
Payer: MEDICARE

## 2024-08-05 DIAGNOSIS — N30.40 RADIATION CYSTITIS: Primary | ICD-10-CM

## 2024-08-05 RX ORDER — SULFAMETHOXAZOLE AND TRIMETHOPRIM 800; 160 MG/1; MG/1
1 TABLET ORAL 2 TIMES DAILY
Qty: 14 TABLET | Refills: 0 | Status: SHIPPED | OUTPATIENT
Start: 2024-08-05 | End: 2024-08-19

## 2024-08-12 ENCOUNTER — APPOINTMENT (OUTPATIENT)
Dept: UROLOGY | Facility: CLINIC | Age: 79
End: 2024-08-12
Payer: MEDICARE

## 2024-08-12 VITALS
DIASTOLIC BLOOD PRESSURE: 74 MMHG | SYSTOLIC BLOOD PRESSURE: 124 MMHG | BODY MASS INDEX: 21.52 KG/M2 | WEIGHT: 142 LBS | HEART RATE: 97 BPM | HEIGHT: 68 IN

## 2024-08-12 DIAGNOSIS — R33.9 URINARY RETENTION: Primary | ICD-10-CM

## 2024-08-12 DIAGNOSIS — C67.9 MALIGNANT NEOPLASM OF URINARY BLADDER, UNSPECIFIED SITE (MULTI): ICD-10-CM

## 2024-08-12 PROCEDURE — 4004F PT TOBACCO SCREEN RCVD TLK: CPT | Performed by: UROLOGY

## 2024-08-12 PROCEDURE — 3078F DIAST BP <80 MM HG: CPT | Performed by: UROLOGY

## 2024-08-12 PROCEDURE — 3074F SYST BP LT 130 MM HG: CPT | Performed by: UROLOGY

## 2024-08-12 PROCEDURE — 1159F MED LIST DOCD IN RCRD: CPT | Performed by: UROLOGY

## 2024-08-12 PROCEDURE — 99213 OFFICE O/P EST LOW 20 MIN: CPT | Performed by: UROLOGY

## 2024-08-12 NOTE — PROGRESS NOTES
08/12/2024  Patient developed UTI symptoms and urinary retention, Isaac was inserted and on antibiotics    Exam: Isaac catheter in place for 10 days, urine clear yellow    Catheter was removed without difficulty    We discussed benign prostate hypertrophy urinary retention urinary tract infection  We discussed the voiding trial today  We discussed the surveillance cystoscopy  All the questions were answered, the patient expressed understanding and agreed to the plan.    Impression  Urinary retention  BPH  Urinary tract infection  Bladder cancer status post TURBT    Plan  Voiding trial today, call back 3 PM  Continue current antibiotics  Surveillance cystoscopy    Chief Complaint   Patient presents with    Benign Prostatic Hypertrophy     Patient is here today for 1 week follow up.  Pt states that he only took the ATB for 2.5 days the urine in the bag looked black, and after he stopped the antibiotic it cleared up.  He will also need a script for tamsulosin.        Physical Exam     TODAYS LAB RESULTS:  Lab Results   Component Value Date    PSA 0.25 09/13/2023    PSA 0.22 09/21/2022    PSA 0.25 04/14/2022        CT SCAN 5-  Narrative & Impression   STUDY:  CT Abdomen and Pelvis with IV Contrast; 5/13/2024 1:20 PM  INDICATION:  Leukocytosis.  COMPARISON:  CT urogram 12/5/2023, 12/14/2022.  ACCESSION NUMBER(S):  [id number]  ORDERING CLINICIAN:  YINKA GONZALEZ  TECHNIQUE:  CT of the abdomen and pelvis was performed.  Contiguous axial images  were obtained at 3 mm slice thickness through the abdomen and pelvis.   Coronal and sagittal reconstructions at 3 mm slice thickness were  performed.  Omnipaque 350--75 mL was administered intravenously.    FINDINGS:  LOWER CHEST:  No cardiomegaly.  No pericardial effusion.  Chronic emphysematous  changes are seen in both lungs.  There are infiltrates in the right  middle lobe.  Infiltrate and/or dependent changes noted in the  posterior right lower lobe.  These findings  were not seen on the prior  study of 12/5/2023.     ABDOMEN:     LIVER:  No hepatomegaly.  Smooth surface contour.  Normal attenuation.     BILE DUCTS:  No intrahepatic or extrahepatic biliary ductal dilatation.     GALLBLADDER:  The gallbladder is normal.  STOMACH:  Study is diffusely thickened however it is not well distended and this  is most likely normal..     PANCREAS:  No masses or ductal dilatation.     SPLEEN:  No splenomegaly or focal splenic lesion.     ADRENAL GLANDS:  There is thickening of the left adrenal gland, most likely  representing hypertrophy.     KIDNEYS AND URETERS:  There are multiple renal cysts as seen previously  No renal or  ureteral calculi.     PELVIS:     BLADDER:  Chronic calcifications noted in the dependent portion of the bladder.   Chronic thickening of the bladder base which may be the result of  encroachment by the prostate.  Prostate seed implants are also noted  in place.     REPRODUCTIVE ORGANS:  No abnormalities identified.     BOWEL:  There is diverticulosis of the sigmoid without evidence of  inflammation.  There is a normal appendix.     VESSELS:  No abnormalities identified.  Abdominal aorta is normal in caliber.      PERITONEUM/RETROPERITONEUM/LYMPH NODES:  No free fluid.  No pneumoperitoneum.  No lymphadenopathy.     ABDOMINAL WALL:  No abnormalities identified.  SOFT TISSUES:   No abnormalities identified.     BONES:  No acute fracture or aggressive osseous lesion.  Multilevel moderately  advanced degenerative disc disease noted.  IMPRESSION:  No focal acute pathology demonstrated in the abdomen and pelvis.  Chronic lateral calcifications, prostatic seed implants and thickening  at the base of the bladder all stable.  Acute appearing infiltrate in the right middle lobe and probable  dependent changes at the right lung base.  These findings are  superimposed on chronic emphysematous disease..  Signed by Rory Orr        ASSESSMENT&PLAN:      IMPRESSIONS:      08/02/2024 Biggrachel Russell PCNA  Complaining gross hematuria with clots over the old month.  Off Eliquis for 1 month     A 18 [place] coudé catheter was inserted, light pink urine now, no clots     We discussed history of bladder cancer, gross hematuria  We discussed short-term Isaac catheter, off Eliquis  We discussed post form cystoscopy  All the questions were answered, the patient expressed understanding and agreed to the plan.     Impression  Hematuria with clots  Bladder cancer  BPH     Plan  Isaac catheter to bag  Increase fluid intake  Cystoscopy next week  Continue Flomax 0.4 mg daily     04/12/2024  History of bladder cancer, 1 month post surveillance cystoscopy.  Voiding okay on Flomax 0.4 mg     Patient has no nausea, no vomiting, no fever.     YUKO: Deferred     PSA: Normal to updated     We discussed bladder cancer, surveillance cystoscopy every 3 to 4 months  We discussed benign prostate hypertrophy continue Flomax 0.4 mg daily, may consider 0.8 mg as well  We discussed PSA screening  All the questions were answered, the patient expressed understanding and agreed to the plan.     Impression  Bladder cancer  BPH     Plan  Cystoscopy in 4-month  Continue Flomax 0.4 mg daily  May consider 0.8 mg daily               04/12/2024  History of bladder cancer, 1 month post surveillance cystoscopy.  Voiding okay on Flomax 0.4 mg     Patient has no nausea, no vomiting, no fever.     YUKO: Deferred     PSA: Normal to updated     We discussed bladder cancer, surveillance cystoscopy every 3 to 4 months  We discussed benign prostate hypertrophy continue Flomax 0.4 mg daily, may consider 0.8 mg as well  We discussed PSA screening  All the questions were answered, the patient expressed understanding and agreed to the plan.     Impression  Bladder cancer  BPH     Plan  Cystoscopy in 4-month  Continue Flomax 0.4 mg daily  May consider 0.8 mg daily        I spent 25 minutes with this  patient. Greater than 50% of this time was spent in counseling and/or coordination of care          Chief Complaint   Patient presents with    Bladder Lesion       Patient here to establish with a urologist in [place]. He previously saw Dr. Marlow at [facility], but now wants to be seen here. Cystoscopy was performed 03/20/2024 per Dr. Marlow. Patient is here to discuss TURBT vs surveillance cystoscopy in 3 months. Patient has a hx of prostate cancer and bladder cancer.          Physical Exam      TODAYS LAB RESULTS:     PSA 09/13/2023  0.25     POC Glucose, Urine  NEGATIVE mg/dl NEGATIVE   POC Bilirubin, Urine  NEGATIVE NEGATIVE   POC Ketones, Urine  NEGATIVE mg/dl NEGATIVE   POC Specific Gravity, Urine  1.005 - 1.035 1.010   POC Blood, Urine  NEGATIVE NEGATIVE   POC PH, Urine  No Reference Range Established PH 6.5   POC Protein, Urine  NEGATIVE, 30 (1+) mg/dl NEGATIVE   POC Urobilinogen, Urine  0.2, 1.0 EU/DL 0.2   Poc Nitrite, Urine  NEGATIVE NEGATIVE   POC Leukocytes, Urine  NEGATIVE NEGATIVE      ASSESSMENT&PLAN:        IMPRESSIONS:        HPI  79 y.o. male who presents for cystoscopic evaluation for bladder lesion Pt has a CT urography, conducted on 12/5/2023, revealed :      Interval resolution of previously visualized areas of soft tissue filling defects of the right-greater-than-left posterior urinary bladder wall without evidence of new abnormal filling defects along the urinary bladder wall on delayed phase imaging. New coarse calcifications within the posterior aspect of the urinary bladder lumen are favored to be secondary to patient's history of intravesical mitomycin treatment (likely treated disease).  Additionally, asymmetric urinary bladder wall thickening along the  right-greater-than-left posterior urinary bladder wall is also  favored to be secondary to posttreatment changes, although underlying  neoplasm can not be excluded. Attention on follow-up is recommended.        Review of Systems     All  systems were reviewed. Anything negative was noted in the HPI.     Objective   Physical Exam     General: Well developed, well nourished, alert and cooperative, appears in no acute distress   Eyes: Non-injected conjunctiva, sclera clear, no proptosis   Cardiac: Extremities are warm and well perfused. No edema, cyanosis or pallor   Lungs: Breathing is easy, non-labored. Speaking in clear and complete sentences. Normal diaphragmatic movement   MSK: Ambulatory with steady gait, unassisted   Neuro: Alert and oriented to person, place, and time   Psych: Demonstrates good judgment and reason, without hallucinations, abnormal affect or abnormal behaviors   Skin: No obvious lesions, no rashes       No CVA tenderness bilaterally   No suprapubic pain or discomfort         Medical History           Past Medical History:   Diagnosis Date    Other conditions influencing health status       Arthritis    Other conditions influencing health status       Bladder Cancer    Other conditions influencing health status       Prostate Cancer               Surgical History             Past Surgical History:   Procedure Laterality Date    HERNIA REPAIR   07/05/2013     Hernia Repair    HERNIA REPAIR   06/11/2013     Hernia Repair    OTHER SURGICAL HISTORY   05/23/2013     General Surgery    OTHER SURGICAL HISTORY   03/17/2017     Sialodochoplasty    OTHER SURGICAL HISTORY   06/11/2013     Tonsillectomy Over Age 13    OTHER SURGICAL HISTORY   06/11/2013     Cystoscopy With Resection Of Tumor         3/20/24 Dr. Marlow  Procedure:     The patient was prepped using a Betadine solution. Lidocaine jelly was instilled into the urethra. The flexible cystoscope was sterilely inserted into the urethra and formal cystoscopy performed in a systematic fashion. For detailed findings of the procedure, please see Dr. Marlow’s remarks below  Scope A used, Cipro 500 mg p.o. given           Assessment/Plan   Cystoscopic evaluation for Bladder lesion on CT      79 y.o. male who presents for the above condition, We had a very long and extensive discussion with the patient regarding the pathophysiology, differential diagnosis, risk factor, management, natural history, incidence and diagnostic work-up of the condition.      Surgical intervention will be scheduled to remove scar tissue and potentially send tissue for pathological examination to rule out malignancy     Plan:  - TURBT Vs observation and fu in 4 months with another cysto  - Pt wants to follow up his care at [facility]            Surgery  7/19/2023 cystoscopy TURBT, mitomycin instillation dr Marlow  1/11/2023 TURBT and mitomycin intravesical instillation dr Marlow

## 2024-09-13 ENCOUNTER — APPOINTMENT (OUTPATIENT)
Dept: UROLOGY | Facility: CLINIC | Age: 79
End: 2024-09-13
Payer: MEDICARE

## 2024-09-13 VITALS
DIASTOLIC BLOOD PRESSURE: 75 MMHG | BODY MASS INDEX: 21.52 KG/M2 | HEART RATE: 81 BPM | WEIGHT: 142 LBS | SYSTOLIC BLOOD PRESSURE: 130 MMHG | HEIGHT: 68 IN

## 2024-09-13 DIAGNOSIS — N21.0 BLADDER STONE: ICD-10-CM

## 2024-09-13 DIAGNOSIS — D49.4 BLADDER TUMOR: Primary | ICD-10-CM

## 2024-09-13 DIAGNOSIS — R35.0 URINARY FREQUENCY: ICD-10-CM

## 2024-09-13 LAB
POC APPEARANCE, URINE: CLEAR
POC BILIRUBIN, URINE: NEGATIVE
POC BLOOD, URINE: ABNORMAL
POC COLOR, URINE: YELLOW
POC GLUCOSE, URINE: NEGATIVE MG/DL
POC KETONES, URINE: NEGATIVE MG/DL
POC LEUKOCYTES, URINE: ABNORMAL
POC NITRITE,URINE: NEGATIVE
POC PH, URINE: 6 PH
POC PROTEIN, URINE: ABNORMAL MG/DL
POC SPECIFIC GRAVITY, URINE: 1.01
POC UROBILINOGEN, URINE: 0.2 EU/DL

## 2024-09-13 PROCEDURE — 81003 URINALYSIS AUTO W/O SCOPE: CPT | Performed by: UROLOGY

## 2024-09-13 PROCEDURE — 52000 CYSTOURETHROSCOPY: CPT | Performed by: UROLOGY

## 2024-09-13 ASSESSMENT — PAIN SCALES - GENERAL: PAINLEVEL: 0-NO PAIN

## 2024-09-13 NOTE — PROGRESS NOTES
09/13/2024   cystoscopy    Voiding well and no blood in urine    A cystoscopy was performed under local without difficulty    Findings: Normal anterior urethra, mild enlarged prostate, 1 x 2 cm bladder stone attached to the mucosa, mucosal irregularity posterior right lateral wall    Pain evaluation: 0/10 before, 2/10 after.    Antibiotics Keflex 500 mg given    We discussed benign prostate hypertrophy urinary retention urinary tract infection  We discussed the voiding trial today  We discussed the surveillance cystoscopy  All the questions were answered, the patient expressed understanding and agreed to the plan.     Impression  Bladder stone  Bladder tumor recurrence  Urinary retention  BPH  Urinary tract infection  Bladder cancer status post TURBT     Plan  OR cystoscopy bladder stone lithotripsy, possible TURBT, bladder biopsy    Chief Complaint   Patient presents with    Bladder Prolapse        Physical Exam     TODAYS LAB RESULTS:  ontains abnormal data POCT UA Automated manually resulted  Order: [id number]   Status: Final result       Visible to patient: Yes (not seen)       Next appt: 09/24/2024 at 01:00 PM in Cardiology (Lulu Good MD)       Dx: Urinary frequency    0 Result Notes          Component  Ref Range & Units 14:27  (9/13/24) 1 mo ago  (8/2/24) 5 mo ago  (4/12/24) 5 mo ago  (3/20/24) 11 mo ago  (10/12/23)   POC Color, Urine  Straw, Yellow, Light-Yellow Yellow VC, R  Yellow Yellow   POC Appearance, Urine  Clear Clear VC, R  Clear Clear   POC Glucose, Urine  NEGATIVE mg/dl NEGATIVE VC, R NEGATIVE NEGATIVE NEGATIVE   POC Bilirubin, Urine  NEGATIVE NEGATIVE VC, R NEGATIVE NEGATIVE NEGATIVE   POC Ketones, Urine  NEGATIVE mg/dl NEGATIVE VC, R NEGATIVE 15 (1+) Abnormal  TRACE Abnormal    POC Specific Gravity, Urine  1.005 - 1.035 1.010 VC, R 1.010 1.020 1.020   POC Blood, Urine  NEGATIVE LARGE (3+) Abnormal  VC, R NEGATIVE NEGATIVE TRACE-Lysed Abnormal    POC PH, Urine  No Reference Range Established  PH 6.0 VC, R 6.5 6.5 6.0   POC Protein, Urine  NEGATIVE, 30 (1+) mg/dl TRACE Abnormal  VC, R NEGATIVE NEGATIVE 30 (1+)   POC Urobilinogen, Urine  0.2, 1.0 EU/DL 0.2 VC, R 0.2 0.2 0.2   Poc Nitrite, Urine  NEGATIVE NEGATIVE VC, R NEGATIVE NEGATIVE NEGATIVE   POC Leukocytes, Urine  NEGATIVE SMALL (1+) Abnormal  VC, R NEGATIVE NEGATIVE MODERATE (2+) Abnormal               Specimen Collected: 09/13/24 14:27 Last Resulted: 09/13/24 14:27        Lab Flowsheet        Order Details        View Encounter        Lab and Collection Details        Routing        Result History     View All Conversations on this Encounter        VC=Value has a corrected status   R=Reference range differs from displayed range        Result Care Coordination      Patient Communication     Add Comments   Add Notifications  Back to Top          POCT UA Automated manually resulted: Patient Communication     Add Comments   Add Notifications      Lab Component SmartPhrase Guide    POCT UA Automated manually resulted (Order #[id number]) on 9/13/24     Specimen Date Taken Specimen Time Taken Specimen Received Date Specimen Received Time Result Date Result Time   Sep 13, 2024  2:27 PM   Sep 13, 2024  2:27 PM       ASSESSMENT&PLAN:      IMPRESSIONS:    08/12/2024  Patient developed UTI symptoms and urinary retention, Isaac was inserted and on antibiotics     Exam: Isaac catheter in place for 10 days, urine clear yellow     Catheter was removed without difficulty     We discussed benign prostate hypertrophy urinary retention urinary tract infection  We discussed the voiding trial today  We discussed the surveillance cystoscopy  All the questions were answered, the patient expressed understanding and agreed to the plan.     Impression  Urinary retention  BPH  Urinary tract infection  Bladder cancer status post TURBT     Plan  Voiding trial today, call back 3 PM  Continue current antibiotics  Surveillance cystoscopy          Chief Complaint   Patient presents  with    Benign Prostatic Hypertrophy       Patient is here today for 1 week follow up.  Pt states that he only took the ATB for 2.5 days the urine in the bag looked black, and after he stopped the antibiotic it cleared up.  He will also need a script for tamsulosin.         Physical Exam      TODAYS LAB RESULTS:        Lab Results   Component Value Date     PSA 0.25 09/13/2023     PSA 0.22 09/21/2022     PSA 0.25 04/14/2022         CT SCAN 5-  Narrative & Impression   STUDY:  CT Abdomen and Pelvis with IV Contrast; 5/13/2024 1:20 PM  INDICATION:  Leukocytosis.  COMPARISON:  CT urogram 12/5/2023, 12/14/2022.  ACCESSION NUMBER(S):  [id number]  ORDERING CLINICIAN:  YINKA GONZALEZ  TECHNIQUE:  CT of the abdomen and pelvis was performed.  Contiguous axial images  were obtained at 3 mm slice thickness through the abdomen and pelvis.   Coronal and sagittal reconstructions at 3 mm slice thickness were  performed.  Omnipaque 350--75 mL was administered intravenously.    FINDINGS:  LOWER CHEST:  No cardiomegaly.  No pericardial effusion.  Chronic emphysematous  changes are seen in both lungs.  There are infiltrates in the right  middle lobe.  Infiltrate and/or dependent changes noted in the  posterior right lower lobe.  These findings were not seen on the prior  study of 12/5/2023.     ABDOMEN:     LIVER:  No hepatomegaly.  Smooth surface contour.  Normal attenuation.     BILE DUCTS:  No intrahepatic or extrahepatic biliary ductal dilatation.     GALLBLADDER:  The gallbladder is normal.  STOMACH:  Study is diffusely thickened however it is not well distended and this  is most likely normal..     PANCREAS:  No masses or ductal dilatation.     SPLEEN:  No splenomegaly or focal splenic lesion.     ADRENAL GLANDS:  There is thickening of the left adrenal gland, most likely  representing hypertrophy.     KIDNEYS AND URETERS:  There are multiple renal cysts as seen previously  No renal or  ureteral calculi.     PELVIS:      BLADDER:  Chronic calcifications noted in the dependent portion of the bladder.   Chronic thickening of the bladder base which may be the result of  encroachment by the prostate.  Prostate seed implants are also noted  in place.     REPRODUCTIVE ORGANS:  No abnormalities identified.     BOWEL:  There is diverticulosis of the sigmoid without evidence of  inflammation.  There is a normal appendix.     VESSELS:  No abnormalities identified.  Abdominal aorta is normal in caliber.      PERITONEUM/RETROPERITONEUM/LYMPH NODES:  No free fluid.  No pneumoperitoneum.  No lymphadenopathy.     ABDOMINAL WALL:  No abnormalities identified.  SOFT TISSUES:   No abnormalities identified.     BONES:  No acute fracture or aggressive osseous lesion.  Multilevel moderately  advanced degenerative disc disease noted.  IMPRESSION:  No focal acute pathology demonstrated in the abdomen and pelvis.  Chronic lateral calcifications, prostatic seed implants and thickening  at the base of the bladder all stable.  Acute appearing infiltrate in the right middle lobe and probable  dependent changes at the right lung base.  These findings are  superimposed on chronic emphysematous disease..  Signed by Rory Orr         ASSESSMENT&PLAN:        IMPRESSIONS:       08/02/2024 Biggrachel Russell PCNA  Complaining gross hematuria with clots over the old month.  Off Eliquis for 1 month     A 18 Macedonian coudé catheter was inserted, light pink urine now, no clots     We discussed history of bladder cancer, gross hematuria  We discussed short-term Isaac catheter, off Eliquis  We discussed post form cystoscopy  All the questions were answered, the patient expressed understanding and agreed to the plan.     Impression  Hematuria with clots  Bladder cancer  BPH     Plan  Isaac catheter to bag  Increase fluid intake  Cystoscopy next week  Continue Flomax 0.4 mg daily     04/12/2024  History of bladder cancer, 1 month post surveillance cystoscopy.  Voiding okay on  Flomax 0.4 mg     Patient has no nausea, no vomiting, no fever.     YUKO: Deferred     PSA: Normal to updated     We discussed bladder cancer, surveillance cystoscopy every 3 to 4 months  We discussed benign prostate hypertrophy continue Flomax 0.4 mg daily, may consider 0.8 mg as well  We discussed PSA screening  All the questions were answered, the patient expressed understanding and agreed to the plan.     Impression  Bladder cancer  BPH     Plan  Cystoscopy in 4-month  Continue Flomax 0.4 mg daily  May consider 0.8 mg daily                 04/12/2024  History of bladder cancer, 1 month post surveillance cystoscopy.  Voiding okay on Flomax 0.4 mg     Patient has no nausea, no vomiting, no fever.     YUKO: Deferred     PSA: Normal to updated     We discussed bladder cancer, surveillance cystoscopy every 3 to 4 months  We discussed benign prostate hypertrophy continue Flomax 0.4 mg daily, may consider 0.8 mg as well  We discussed PSA screening  All the questions were answered, the patient expressed understanding and agreed to the plan.     Impression  Bladder cancer  BPH     Plan  Cystoscopy in 4-month  Continue Flomax 0.4 mg daily  May consider 0.8 mg daily        I spent 25 minutes with this patient. Greater than 50% of this time was spent in counseling and/or coordination of care             Chief Complaint   Patient presents with    Bladder Lesion       Patient here to establish with a urologist in [place]. He previously saw Dr. Marlow at [facility], but now wants to be seen here. Cystoscopy was performed 03/20/2024 per Dr. Marlow. Patient is here to discuss TURBT vs surveillance cystoscopy in 3 months. Patient has a hx of prostate cancer and bladder cancer.          Physical Exam      TODAYS LAB RESULTS:     PSA 09/13/2023  0.25     POC Glucose, Urine  NEGATIVE mg/dl NEGATIVE   POC Bilirubin, Urine  NEGATIVE NEGATIVE   POC Ketones, Urine  NEGATIVE mg/dl NEGATIVE   POC Specific Gravity, Urine  1.005 - 1.035 1.010    POC Blood, Urine  NEGATIVE NEGATIVE   POC PH, Urine  No Reference Range Established PH 6.5   POC Protein, Urine  NEGATIVE, 30 (1+) mg/dl NEGATIVE   POC Urobilinogen, Urine  0.2, 1.0 EU/DL 0.2   Poc Nitrite, Urine  NEGATIVE NEGATIVE   POC Leukocytes, Urine  NEGATIVE NEGATIVE      ASSESSMENT&PLAN:        IMPRESSIONS:        HPI  79 y.o. male who presents for cystoscopic evaluation for bladder lesion Pt has a CT urography, conducted on 12/5/2023, revealed :      Interval resolution of previously visualized areas of soft tissue filling defects of the right-greater-than-left posterior urinary bladder wall without evidence of new abnormal filling defects along the urinary bladder wall on delayed phase imaging. New coarse calcifications within the posterior aspect of the urinary bladder lumen are favored to be secondary to patient's history of intravesical mitomycin treatment (likely treated disease).  Additionally, asymmetric urinary bladder wall thickening along the  right-greater-than-left posterior urinary bladder wall is also  favored to be secondary to posttreatment changes, although underlying  neoplasm can not be excluded. Attention on follow-up is recommended.        Review of Systems     All systems were reviewed. Anything negative was noted in the HPI.     Objective   Physical Exam     General: Well developed, well nourished, alert and cooperative, appears in no acute distress   Eyes: Non-injected conjunctiva, sclera clear, no proptosis   Cardiac: Extremities are warm and well perfused. No edema, cyanosis or pallor   Lungs: Breathing is easy, non-labored. Speaking in clear and complete sentences. Normal diaphragmatic movement   MSK: Ambulatory with steady gait, unassisted   Neuro: Alert and oriented to person, place, and time   Psych: Demonstrates good judgment and reason, without hallucinations, abnormal affect or abnormal behaviors   Skin: No obvious lesions, no rashes       No CVA tenderness bilaterally    No suprapubic pain or discomfort         Medical History           Past Medical History:   Diagnosis Date    Other conditions influencing health status       Arthritis    Other conditions influencing health status       Bladder Cancer    Other conditions influencing health status       Prostate Cancer               Surgical History             Past Surgical History:   Procedure Laterality Date    HERNIA REPAIR   07/05/2013     Hernia Repair    HERNIA REPAIR   06/11/2013     Hernia Repair    OTHER SURGICAL HISTORY   05/23/2013     General Surgery    OTHER SURGICAL HISTORY   03/17/2017     Sialodochoplasty    OTHER SURGICAL HISTORY   06/11/2013     Tonsillectomy Over Age 13    OTHER SURGICAL HISTORY   06/11/2013     Cystoscopy With Resection Of Tumor         3/20/24 Dr. Marlow  Procedure:     The patient was prepped using a Betadine solution. Lidocaine jelly was instilled into the urethra. The flexible cystoscope was sterilely inserted into the urethra and formal cystoscopy performed in a systematic fashion. For detailed findings of the procedure, please see Dr. Marlow’s remarks below  Scope A used, Cipro 500 mg p.o. given           Assessment/Plan   Cystoscopic evaluation for Bladder lesion on CT     79 y.o. male who presents for the above condition, We had a very long and extensive discussion with the patient regarding the pathophysiology, differential diagnosis, risk factor, management, natural history, incidence and diagnostic work-up of the condition.      Surgical intervention will be scheduled to remove scar tissue and potentially send tissue for pathological examination to rule out malignancy     Plan:  - TURBT Vs observation and fu in 4 months with another cysto  - Pt wants to follow up his care at [place]            Surgery  7/19/2023 cystoscopy TURBT, mitomycin instillation dr Marlow  1/11/2023 TURBT and mitomycin intravesical instillation dr Marlow

## 2024-09-16 ENCOUNTER — TELEPHONE (OUTPATIENT)
Dept: UROLOGY | Facility: CLINIC | Age: 79
End: 2024-09-16
Payer: MEDICARE

## 2024-09-16 DIAGNOSIS — D49.4 BLADDER TUMOR: Primary | ICD-10-CM

## 2024-09-16 PROBLEM — N21.0 BLADDER STONE: Status: ACTIVE | Noted: 2024-09-13

## 2024-09-16 NOTE — TELEPHONE ENCOUNTER
Pt scheduled for turbt bladder stone lithalopaxy on 10-8-24 he says he thought you wanted him to have a ct scan to check his kidneys , but there is no order placed please advise.

## 2024-09-20 ENCOUNTER — TELEPHONE (OUTPATIENT)
Dept: PRIMARY CARE | Facility: CLINIC | Age: 79
End: 2024-09-20
Payer: MEDICARE

## 2024-09-20 NOTE — TELEPHONE ENCOUNTER
----- Message from Mook Porter sent at 9/20/2024 12:27 PM EDT -----  Pls schedule  ----- Message -----  From: Ashlyn Koch  Sent: 9/16/2024   9:12 AM EDT  To: Mook Porter MD

## 2024-09-24 ENCOUNTER — APPOINTMENT (OUTPATIENT)
Dept: CARDIOLOGY | Facility: CLINIC | Age: 79
End: 2024-09-24
Payer: MEDICARE

## 2024-09-24 ENCOUNTER — TELEPHONE (OUTPATIENT)
Dept: CARDIOLOGY | Facility: HOSPITAL | Age: 79
End: 2024-09-24

## 2024-09-24 VITALS
DIASTOLIC BLOOD PRESSURE: 68 MMHG | WEIGHT: 153 LBS | HEIGHT: 68 IN | HEART RATE: 71 BPM | SYSTOLIC BLOOD PRESSURE: 136 MMHG | BODY MASS INDEX: 23.19 KG/M2

## 2024-09-24 DIAGNOSIS — Z72.0 TOBACCO ABUSE: Chronic | ICD-10-CM

## 2024-09-24 DIAGNOSIS — I10 ESSENTIAL (PRIMARY) HYPERTENSION: ICD-10-CM

## 2024-09-24 DIAGNOSIS — Z01.810 PREOPERATIVE CARDIOVASCULAR EXAMINATION: Primary | ICD-10-CM

## 2024-09-24 DIAGNOSIS — R79.0 LOW MAGNESIUM LEVEL: ICD-10-CM

## 2024-09-24 DIAGNOSIS — R31.29 MICROSCOPIC HEMATURIA: ICD-10-CM

## 2024-09-24 DIAGNOSIS — I48.91 ATRIAL FIBRILLATION WITH RAPID VENTRICULAR RESPONSE (MULTI): ICD-10-CM

## 2024-09-24 PROCEDURE — 99215 OFFICE O/P EST HI 40 MIN: CPT | Performed by: INTERNAL MEDICINE

## 2024-09-24 PROCEDURE — 3078F DIAST BP <80 MM HG: CPT | Performed by: INTERNAL MEDICINE

## 2024-09-24 PROCEDURE — 93010 ELECTROCARDIOGRAM REPORT: CPT | Performed by: INTERNAL MEDICINE

## 2024-09-24 PROCEDURE — 93005 ELECTROCARDIOGRAM TRACING: CPT | Performed by: INTERNAL MEDICINE

## 2024-09-24 PROCEDURE — 3075F SYST BP GE 130 - 139MM HG: CPT | Performed by: INTERNAL MEDICINE

## 2024-09-24 PROCEDURE — 1159F MED LIST DOCD IN RCRD: CPT | Performed by: INTERNAL MEDICINE

## 2024-09-24 PROCEDURE — 4004F PT TOBACCO SCREEN RCVD TLK: CPT | Performed by: INTERNAL MEDICINE

## 2024-09-24 RX ORDER — GUAIFENESIN 600 MG/1
1200 TABLET, EXTENDED RELEASE ORAL AS NEEDED
COMMUNITY

## 2024-09-24 NOTE — PROGRESS NOTES
Chief Complaint:   No chief complaint on file.     History Of Present Illness:    Cedric Mohan is a 79 y.o. male presenting for 4 month follow up and risk stratification for bladder surgery - tPreoperative risk stratification prior to upcoming cystoscopy, TURBT, bladder stone lithalopaxy under general anesthesia scheduled for 10/8/24.    Past medical history of HTN, tobacco use disorder (no dx COPD, no PFTs), BPH with LUTS, bladder cancer s/p TURBT and mitomycin intravesical instillation (01/2023) who recently presented with shortness of breath, productive cough (clear sputum), congestion, decreased appetite, and general malaise progressively worsening x ~3 weeks. While patient was in the ED he had an episode of Afib RVR which was a new diagnosis and spontaneously converted to SR. Patient was started on Diltiazem 240 mg daily and Apixaban 5 mg BID.     Tells me that he stopped taking eliquis due to ongoing hematuria, and that his urologist told him to stop taking it.  After he stopped eliquis, he has continued to have episodic hematuria.  He had a cystoscopy and was treated with bactrim x 7 days, and the bleeding seemed to stop.  He is here for risk stratification ahead of bladder surgery.  Tells me he is physically active, able to walk up two flights of stairs and four blocks on level ground without stopping.  He denies any chest pain/pressure, significant NOWAK.  He does have SOB with heavy physical activity.  No LE edema, orthopnea, PND.  He denies significant palps, dizziness, presyncope, syncope.    He denies prior history of MI/CAD.  No prior stress test in our system.  EKG today NSR HR 71, no acute ST/T wave abnormalities.    ROS:  The remainder of the review of systems was obtained, as was negative as pertains to the chief complaint.    CV testing and labs:   Labs 6/2024  H&H 11.5 36.3  K 4.4  BUN 16  crea 0.91 ast 11 alt 11  Monitor worn form 6/19-7/3/2024 min heart rate 49  max 211 av 76, predominant  sinus rhythm.  One run vtach 5 beats max rate 211.  Afib <1% burden  bpm av 114 longest lasting one hour 10 minutes av rate 123.  Isolated SVE's 3.1%   TTE 5/2024 EF 55-60% stage I DD,  trace to mild tricuspid regurg, trivial pericardial effusion     Last Recorded Vitals:  There were no vitals filed for this visit.    Past Medical History:  He has a past medical history of Other conditions influencing health status, Other conditions influencing health status, and Other conditions influencing health status.    Past Surgical History:  He has a past surgical history that includes Other surgical history (05/23/2013); Hernia repair (07/05/2013); Other surgical history (03/17/2017); Hernia repair (06/11/2013); Other surgical history (06/11/2013); and Other surgical history (06/11/2013).      Social History:  He reports that he has quit smoking. His smoking use included cigarettes. He has been exposed to tobacco smoke. He has never used smokeless tobacco. He reports that he does not drink alcohol and does not use drugs.    Family History:  Family History   Problem Relation Name Age of Onset    Breast cancer Mother      Other (stroke syndrome) Mother      Prostate cancer Brother          Allergies:  Ciprofloxacin, Droxy, Alfuzosin, Azithromycin, and Tramadol    Outpatient Medications:  Current Outpatient Medications   Medication Instructions    albuterol 90 mcg/actuation inhaler 2 puffs, inhalation, Every 6 hours PRN    apixaban (ELIQUIS) 5 mg, oral, Every 12 hours    aspirin 81 mg, oral, Daily    cyanocobalamin, vitamin B-12, (VITAMIN B-12 ORAL) oral    dilTIAZem CD (CARDIZEM CD) 300 mg, oral, Daily    esomeprazole (NEXIUM) 40 mg, oral, Daily before breakfast, Do not open capsule.    fluticasone (Flonase) 50 mcg/actuation nasal spray 1 spray, Each Nostril, Daily, Shake gently. Before first use, prime pump. After use, clean tip and replace cap.    multivitamin tablet 1 tablet, oral, Daily    tamsulosin (FLOMAX) 0.4  mg, oral, Daily, Daily before bed       Physical Exam:  Physical Exam  HENT:      Head: Normocephalic.      Nose: Nose normal.      Mouth/Throat:      Mouth: Mucous membranes are moist.   Cardiovascular:      Rate and Rhythm: Normal rate and regular rhythm.      Comments: No carotid bruits   No leg swelling   Pulmonary:      Effort: Pulmonary effort is normal.      Breath sounds: Normal breath sounds.   Abdominal:      Palpations: Abdomen is soft.   Musculoskeletal:         General: Normal range of motion.      Cervical back: Normal range of motion.   Skin:     General: Skin is warm and dry.   Neurological:      General: No focal deficit present.      Mental Status: He is alert.   Psychiatric:         Mood and Affect: Mood normal.           Last Labs:  CBC -  Lab Results   Component Value Date    WBC 15.7 (H) 06/17/2024    HGB 11.5 (L) 06/17/2024    HCT 36.3 (L) 06/17/2024     (H) 06/17/2024     06/17/2024       CMP -  Lab Results   Component Value Date    CALCIUM 8.7 06/06/2024    PROT 6.8 06/06/2024    ALBUMIN 3.3 (L) 06/06/2024    AST 11 06/06/2024    ALT 11 06/06/2024    ALKPHOS 100 06/06/2024    BILITOT 0.3 06/06/2024       LIPID PANEL -   Lab Results   Component Value Date    CHOL 220 (H) 10/03/2023    TRIG 162 (H) 10/03/2023    HDL 52.0 10/03/2023    CHHDL 4.2 10/03/2023    LDLF 116 (H) 10/10/2022    VLDL 32 10/03/2023    NHDL 168 (H) 10/03/2023       RENAL FUNCTION PANEL -   Lab Results   Component Value Date    GLUCOSE 112 (H) 06/06/2024     (L) 06/06/2024    K 4.4 06/06/2024     06/06/2024    CO2 24 06/06/2024    ANIONGAP 12 06/06/2024    BUN 16 06/06/2024    CREATININE 0.91 06/06/2024    GFRMALE 75 09/13/2023    CALCIUM 8.7 06/06/2024    ALBUMIN 3.3 (L) 06/06/2024        Lab Results   Component Value Date    BNP 25 05/13/2024         Assessment/Plan   Preoperative risk stratification prior to upcoming cystoscopy, TURBT, bladder stone lithalopaxy under general anesthesia scheduled  "for 10/8/24:  Based on the RCRI calculator, the patient's  30-day risk of death, MI, cardiac arrest is 3.9%.  The patient has no significant active cardiac symptoms.  He appears euvolemic, and EKG in office today demonstrates sinus rhythm and no acute ST/T wave abnormalities.  His level of activity is at least 4 METS.  Based on the above, the patient is considered to be medically optimized from a cardiac perspective.    I would recommend that he stay on aspirin 81mg daily if possible during the perioperative period.  If he should need to hold aspirin, he should be aware that this does increase his risk of cardiovascular events.  If he is instructed by urology to hold aspirin, it should be for the least amount of time as is safe from a procedural perspective.    pAfib:  NSR today.  No reported episodes of irreg HR/tachycardia/palps.  Afib diagnosis in setting of sepsis from PNA, hypomagnesemia  Converted to SR spontaneously but had brief recurrent episodes during hospitalization.  TTE 5/14/24:  Left ventricular systolic function is normal with a 55-60% estimated ejection fraction. Spectral Doppler shows an impaired relaxation pattern of left ventricular diastolic filling.  [id number] score of 3 and patient was started on Apixaban 5 mg BID. Patient tells me he had recent episode of \"significant hematuria\" and his PCP and urologist told him to stop Apixaban.   He verbalized understanding of his risk of stroke off of Apixaban and recommendations would be to resume as his H/H stable and no further bleeding. He reports he understands risk of holding but he will not start back - he tells me \"I'm not starting on that ever again.\"    Continue rate control with Diltiazem 240 mg daily, patient is unsure if he is taking. I recommend he start taking as ordered.      Hypertension  Today's /68  Elevated today, will resume Diltiazem as directed and will monitor BP                 "

## 2024-09-24 NOTE — PATIENT INSTRUCTIONS
Thanks for following up in office today.    1)  We have completed your risk stratification and we  will have our office note sent over to Dr Ferrell's office .      2)  We talked about starting the eliquis.  You are not interested in restarting this and you know the risks of not doing so. If you change your mind let us know.    3)  Please continue your cardiac medications as prescribed.    Follow up with WAGNER Conley NP in 6 months  If you have any questions, please call [phone] and choose option for Dr. Good's nurse Kayleigh Brown

## 2024-09-25 ENCOUNTER — APPOINTMENT (OUTPATIENT)
Dept: PRIMARY CARE | Facility: CLINIC | Age: 79
End: 2024-09-25
Payer: MEDICARE

## 2024-09-25 VITALS
TEMPERATURE: 99 F | SYSTOLIC BLOOD PRESSURE: 130 MMHG | HEART RATE: 73 BPM | WEIGHT: 155 LBS | OXYGEN SATURATION: 94 % | BODY MASS INDEX: 23.49 KG/M2 | DIASTOLIC BLOOD PRESSURE: 72 MMHG | HEIGHT: 68 IN

## 2024-09-25 DIAGNOSIS — M50.90 CERVICAL DISC DISORDER: Chronic | ICD-10-CM

## 2024-09-25 DIAGNOSIS — Z72.0 TOBACCO ABUSE: ICD-10-CM

## 2024-09-25 DIAGNOSIS — Z00.00 MEDICARE ANNUAL WELLNESS VISIT, SUBSEQUENT: Primary | ICD-10-CM

## 2024-09-25 DIAGNOSIS — R31.9 HEMATURIA, UNSPECIFIED TYPE: ICD-10-CM

## 2024-09-25 DIAGNOSIS — M50.80 CALCIFICATION OF INTERVERTEBRAL CARTILAGE OR DISC OF CERVICAL REGION: ICD-10-CM

## 2024-09-25 DIAGNOSIS — J30.1 SEASONAL ALLERGIC RHINITIS DUE TO POLLEN: ICD-10-CM

## 2024-09-25 DIAGNOSIS — J44.9 CHRONIC OBSTRUCTIVE PULMONARY DISEASE, UNSPECIFIED COPD TYPE (MULTI): ICD-10-CM

## 2024-09-25 DIAGNOSIS — C67.9 MALIGNANT NEOPLASM OF URINARY BLADDER, UNSPECIFIED SITE (MULTI): ICD-10-CM

## 2024-09-25 DIAGNOSIS — K11.5 SALIVARY STONES: ICD-10-CM

## 2024-09-25 DIAGNOSIS — R39.11 BENIGN PROSTATIC HYPERPLASIA WITH URINARY HESITANCY: ICD-10-CM

## 2024-09-25 DIAGNOSIS — I10 HYPERTENSION, UNSPECIFIED TYPE: ICD-10-CM

## 2024-09-25 DIAGNOSIS — I10 ESSENTIAL (PRIMARY) HYPERTENSION: ICD-10-CM

## 2024-09-25 DIAGNOSIS — K21.9 PEPTIC REFLUX DISEASE: Chronic | ICD-10-CM

## 2024-09-25 DIAGNOSIS — N52.9 MALE ERECTILE DISORDER OF ORGANIC ORIGIN: ICD-10-CM

## 2024-09-25 DIAGNOSIS — C61 ADENOCARCINOMA OF PROSTATE (MULTI): Chronic | ICD-10-CM

## 2024-09-25 DIAGNOSIS — I48.91 ATRIAL FIBRILLATION WITH RAPID VENTRICULAR RESPONSE (MULTI): ICD-10-CM

## 2024-09-25 DIAGNOSIS — E87.6 HYPOKALEMIA: ICD-10-CM

## 2024-09-25 DIAGNOSIS — K14.6 GLOSSODYNIA: ICD-10-CM

## 2024-09-25 DIAGNOSIS — N40.1 BENIGN PROSTATIC HYPERPLASIA WITH URINARY HESITANCY: ICD-10-CM

## 2024-09-25 PROCEDURE — 99397 PER PM REEVAL EST PAT 65+ YR: CPT | Performed by: INTERNAL MEDICINE

## 2024-09-25 PROCEDURE — G0439 PPPS, SUBSEQ VISIT: HCPCS | Performed by: INTERNAL MEDICINE

## 2024-09-25 PROCEDURE — 3075F SYST BP GE 130 - 139MM HG: CPT | Performed by: INTERNAL MEDICINE

## 2024-09-25 PROCEDURE — 1158F ADVNC CARE PLAN TLK DOCD: CPT | Performed by: INTERNAL MEDICINE

## 2024-09-25 PROCEDURE — 1159F MED LIST DOCD IN RCRD: CPT | Performed by: INTERNAL MEDICINE

## 2024-09-25 PROCEDURE — 3078F DIAST BP <80 MM HG: CPT | Performed by: INTERNAL MEDICINE

## 2024-09-25 PROCEDURE — 99213 OFFICE O/P EST LOW 20 MIN: CPT | Performed by: INTERNAL MEDICINE

## 2024-09-25 PROCEDURE — G0008 ADMIN INFLUENZA VIRUS VAC: HCPCS | Performed by: INTERNAL MEDICINE

## 2024-09-25 PROCEDURE — 90662 IIV NO PRSV INCREASED AG IM: CPT | Performed by: INTERNAL MEDICINE

## 2024-09-25 PROCEDURE — 1123F ACP DISCUSS/DSCN MKR DOCD: CPT | Performed by: INTERNAL MEDICINE

## 2024-09-25 PROCEDURE — 1170F FXNL STATUS ASSESSED: CPT | Performed by: INTERNAL MEDICINE

## 2024-09-25 RX ORDER — MOMETASONE FUROATE MONOHYDRATE 50 UG/1
2 SPRAY, METERED NASAL 2 TIMES DAILY
Qty: 17 G | Refills: 11 | Status: SHIPPED | OUTPATIENT
Start: 2024-09-25 | End: 2025-09-25

## 2024-09-25 ASSESSMENT — ENCOUNTER SYMPTOMS
COUGH: 0
EYE ITCHING: 0
APNEA: 0
DIAPHORESIS: 0
EYE DISCHARGE: 0
SHORTNESS OF BREATH: 0
EYE REDNESS: 0
HEADACHES: 0
FACIAL ASYMMETRY: 0
GASTROINTESTINAL NEGATIVE: 1
PALPITATIONS: 0
STRIDOR: 0
FEVER: 0
EYE PAIN: 0
WHEEZING: 0
ARTHRALGIAS: 0
CHILLS: 0
UNEXPECTED WEIGHT CHANGE: 0
ENDOCRINE NEGATIVE: 1
HEMATOLOGIC/LYMPHATIC NEGATIVE: 1
APPETITE CHANGE: 0
FATIGUE: 0
CHOKING: 0
CHEST TIGHTNESS: 0
PHOTOPHOBIA: 0
DIZZINESS: 0
NUMBNESS: 0
LIGHT-HEADEDNESS: 0
ACTIVITY CHANGE: 0
PSYCHIATRIC NEGATIVE: 1
ALLERGIC/IMMUNOLOGIC NEGATIVE: 1

## 2024-09-25 ASSESSMENT — ACTIVITIES OF DAILY LIVING (ADL)
TAKING_MEDICATION: INDEPENDENT
BATHING: INDEPENDENT
DRESSING: INDEPENDENT
GROCERY_SHOPPING: INDEPENDENT
MANAGING_FINANCES: INDEPENDENT
DOING_HOUSEWORK: INDEPENDENT

## 2024-09-25 ASSESSMENT — PATIENT HEALTH QUESTIONNAIRE - PHQ9
2. FEELING DOWN, DEPRESSED OR HOPELESS: NOT AT ALL
SUM OF ALL RESPONSES TO PHQ9 QUESTIONS 1 AND 2: 0
1. LITTLE INTEREST OR PLEASURE IN DOING THINGS: NOT AT ALL

## 2024-09-25 NOTE — PROGRESS NOTES
Subjective   Reason for Visit: Cedric Mohan is an 79 y.o. male here for a Medicare Wellness visit.               HPI  per Dr Ferrell   in need of   cystoscopy transuretheral resection bladder, tumor, bladder stone lithalopaxy .       He feels better    He says he is not smoking   No heart symptoms  He did see cards and all ok for or n     Ok for or   Medical History    Diagnosis Date Comment Source   Other conditions influencing health status  Arthritis    Other conditions influencing health status  Bladder Cancer    Other conditions influencing health status  Prostate Cancer      Surgical History    Procedure Laterality Date Comment Source   HERNIA REPAIR  07/05/2013 Hernia Repair    HERNIA REPAIR  06/11/2013 Hernia Repair    OTHER SURGICAL HISTORY  05/23/2013 General Surgery    OTHER SURGICAL HISTORY  03/17/2017 Sialodochoplasty    OTHER SURGICAL HISTORY  06/11/2013 Tonsillectomy Over Age 13    OTHER SURGICAL HISTORY  06/11/2013 Cystoscopy With Resection Of Tumor      Family History  Pedigree Relation Problem Comments   Mother Breast cancer    stroke syndrome       Brother Prostate cancer         Tobacco Use    Former; Types: Cigarettes   Passive Exposure: Current   Smokeless Tobacco: Never used smokeless tobacco.     Alcohol Use    Never.     Drug Use    Never.     Employment History    No employment history on file.     Family and Education    Marital Status        Social Identity    Preferred Language Ethnicity Race   English Not , /a, or Dutch origin White        Social Documentation    No issues     Patient Care Team:  Mook Porter MD as PCP - General (Internal Medicine)  Mook Porter MD as PCP - [employer] PCP     Review of Systems   Constitutional:  Negative for activity change, appetite change, chills, diaphoresis, fatigue, fever and unexpected weight change.   HENT: Negative.     Eyes:  Negative for photophobia, pain, discharge, redness, itching and visual  disturbance.   Respiratory:  Negative for apnea, cough, choking, chest tightness, shortness of breath, wheezing and stridor.    Cardiovascular:  Negative for chest pain, palpitations and leg swelling.   Gastrointestinal: Negative.    Endocrine: Negative.    Genitourinary: Negative.    Musculoskeletal:  Negative for arthralgias.   Skin: Negative.    Allergic/Immunologic: Negative.    Neurological:  Negative for dizziness, facial asymmetry, light-headedness, numbness and headaches.   Hematological: Negative.    Psychiatric/Behavioral: Negative.       Objective   Vitals:  There were no vitals taken for this visit.      Physical Exam  Constitutional:       Appearance: Normal appearance.   HENT:      Head: Normocephalic and atraumatic.      Right Ear: Tympanic membrane normal.      Left Ear: Tympanic membrane normal.      Nose: Nose normal.   Eyes:      Extraocular Movements: Extraocular movements intact.      Conjunctiva/sclera: Conjunctivae normal.      Pupils: Pupils are equal, round, and reactive to light.   Cardiovascular:      Rate and Rhythm: Normal rate and regular rhythm.      Pulses: Normal pulses.      Heart sounds: Normal heart sounds.   Pulmonary:      Effort: Pulmonary effort is normal.      Breath sounds: Rhonchi present.   Abdominal:      General: Abdomen is flat. Bowel sounds are normal.      Palpations: Abdomen is soft.   Genitourinary:     Penis: Normal.       Testes: Normal.      Prostate: Normal.   Musculoskeletal:         General: Normal range of motion.      Cervical back: Normal range of motion and neck supple.   Skin:     General: Skin is warm and dry.   Neurological:      General: No focal deficit present.      Mental Status: He is oriented to person, place, and time. Mental status is at baseline.   Psychiatric:         Mood and Affect: Mood normal.         Behavior: Behavior normal.         Thought Content: Thought content normal.         Judgment: Judgment normal.         Assessment &  Plan  Medicare annual wellness visit, subsequent         Hypokalemia  resolved       Adenocarcinoma of prostate (Multi)  Sp rx all good        Calcification of intervertebral cartilage or disc of cervical region  Ok  better        Cervical disc disorder  Better no pain mx.       Benign prostatic hyperplasia with urinary hesitancy  Stable        Malignant neoplasm of urinary bladder, unspecified site (Multi)  To get cysto        Glossodynia  Better        Atrial fibrillation with rapid ventricular response (Multi)  Resolved see cards notes       Essential (primary) hypertension  Good now       Peptic reflux disease  Good        Salivary stones         Male erectile disorder of organic origin  Stable        Tobacco abuse  Patient reports quit       Hypertension, unspecified type  ok       Chronic obstructive pulmonary disease, unspecified COPD type (Multi)  He is off inhalers and feels well       Hematuria, unspecified type  Cysto

## 2024-09-25 NOTE — H&P (VIEW-ONLY)
Subjective   Reason for Visit: Cedric Mohan is an 79 y.o. male here for a Medicare Wellness visit.               HPI  per Dr Ferrell   in need of   cystoscopy transuretheral resection bladder, tumor, bladder stone lithalopaxy .       He feels better    He says he is not smoking   No heart symptoms  He did see cards and all ok for or n     Ok for or   Medical History    Diagnosis Date Comment Source   Other conditions influencing health status  Arthritis    Other conditions influencing health status  Bladder Cancer    Other conditions influencing health status  Prostate Cancer      Surgical History    Procedure Laterality Date Comment Source   HERNIA REPAIR  07/05/2013 Hernia Repair    HERNIA REPAIR  06/11/2013 Hernia Repair    OTHER SURGICAL HISTORY  05/23/2013 General Surgery    OTHER SURGICAL HISTORY  03/17/2017 Sialodochoplasty    OTHER SURGICAL HISTORY  06/11/2013 Tonsillectomy Over Age 13    OTHER SURGICAL HISTORY  06/11/2013 Cystoscopy With Resection Of Tumor      Family History  Pedigree Relation Problem Comments   Mother Breast cancer    stroke syndrome       Brother Prostate cancer         Tobacco Use    Former; Types: Cigarettes   Passive Exposure: Current   Smokeless Tobacco: Never used smokeless tobacco.     Alcohol Use    Never.     Drug Use    Never.     Employment History    No employment history on file.     Family and Education    Marital Status        Social Identity    Preferred Language Ethnicity Race   English Not , /a, or Mosotho origin White        Social Documentation    No issues     Patient Care Team:  Mook Porter MD as PCP - General (Internal Medicine)  Mook Porter MD as PCP - [employer]vantage PCP     Review of Systems   Constitutional:  Negative for activity change, appetite change, chills, diaphoresis, fatigue, fever and unexpected weight change.   HENT: Negative.     Eyes:  Negative for photophobia, pain, discharge, redness, itching and visual  disturbance.   Respiratory:  Negative for apnea, cough, choking, chest tightness, shortness of breath, wheezing and stridor.    Cardiovascular:  Negative for chest pain, palpitations and leg swelling.   Gastrointestinal: Negative.    Endocrine: Negative.    Genitourinary: Negative.    Musculoskeletal:  Negative for arthralgias.   Skin: Negative.    Allergic/Immunologic: Negative.    Neurological:  Negative for dizziness, facial asymmetry, light-headedness, numbness and headaches.   Hematological: Negative.    Psychiatric/Behavioral: Negative.       Objective   Vitals:  There were no vitals taken for this visit.      Physical Exam  Constitutional:       Appearance: Normal appearance.   HENT:      Head: Normocephalic and atraumatic.      Right Ear: Tympanic membrane normal.      Left Ear: Tympanic membrane normal.      Nose: Nose normal.   Eyes:      Extraocular Movements: Extraocular movements intact.      Conjunctiva/sclera: Conjunctivae normal.      Pupils: Pupils are equal, round, and reactive to light.   Cardiovascular:      Rate and Rhythm: Normal rate and regular rhythm.      Pulses: Normal pulses.      Heart sounds: Normal heart sounds.   Pulmonary:      Effort: Pulmonary effort is normal.      Breath sounds: Rhonchi present.   Abdominal:      General: Abdomen is flat. Bowel sounds are normal.      Palpations: Abdomen is soft.   Genitourinary:     Penis: Normal.       Testes: Normal.      Prostate: Normal.   Musculoskeletal:         General: Normal range of motion.      Cervical back: Normal range of motion and neck supple.   Skin:     General: Skin is warm and dry.   Neurological:      General: No focal deficit present.      Mental Status: He is oriented to person, place, and time. Mental status is at baseline.   Psychiatric:         Mood and Affect: Mood normal.         Behavior: Behavior normal.         Thought Content: Thought content normal.         Judgment: Judgment normal.         Assessment &  Plan  Medicare annual wellness visit, subsequent         Hypokalemia  resolved       Adenocarcinoma of prostate (Multi)  Sp rx all good        Calcification of intervertebral cartilage or disc of cervical region  Ok  better        Cervical disc disorder  Better no pain mx.       Benign prostatic hyperplasia with urinary hesitancy  Stable        Malignant neoplasm of urinary bladder, unspecified site (Multi)  To get cysto        Glossodynia  Better        Atrial fibrillation with rapid ventricular response (Multi)  Resolved see cards notes       Essential (primary) hypertension  Good now       Peptic reflux disease  Good        Salivary stones         Male erectile disorder of organic origin  Stable        Tobacco abuse  Patient reports quit       Hypertension, unspecified type  ok       Chronic obstructive pulmonary disease, unspecified COPD type (Multi)  He is off inhalers and feels well       Hematuria, unspecified type  Cysto

## 2024-09-26 ENCOUNTER — HOSPITAL ENCOUNTER (OUTPATIENT)
Dept: RADIOLOGY | Facility: HOSPITAL | Age: 79
Discharge: HOME | End: 2024-09-26
Payer: MEDICARE

## 2024-09-26 DIAGNOSIS — D49.4 BLADDER TUMOR: ICD-10-CM

## 2024-09-26 PROCEDURE — 74176 CT ABD & PELVIS W/O CONTRAST: CPT

## 2024-10-03 ENCOUNTER — ANESTHESIA EVENT (OUTPATIENT)
Dept: OPERATING ROOM | Facility: HOSPITAL | Age: 79
End: 2024-10-03
Payer: MEDICARE

## 2024-10-03 NOTE — ANESTHESIA PREPROCEDURE EVALUATION
Patient: Cedric Mohan    Procedure Information       Date/Time: 10/08/24 0745    Procedures:       cystoscopy transuretheral resection bladder, tumor, bladder stone lithalopaxy ( Holmium laser for bladder stone ) - cystoscopy transuretheral resection bladder, tumor,  bladder stone lithalopaxy  (60 min Holmium laser  for bladder stone )      .    Location: [facility] / [facility]    Surgeons: Js Ferrell MD            Relevant Problems   Anesthesia (within normal limits)      Cardiac   (+) Atrial fibrillation with rapid ventricular response (Multi)   (+) Hypertension      Pulmonary   (+) Chronic obstructive pulmonary disease (Multi)      GI   (+) Peptic reflux disease      /Renal   (+) Acute cystitis without hematuria   (+) Adenocarcinoma of prostate (Multi)   (+) Benign prostatic hyperplasia   (+) Enlarged prostate with lower urinary tract symptoms (LUTS)      Musculoskeletal   (+) Calcification of intervertebral cartilage or disc of cervical region   (+) Cervical spondylosis without myelopathy      ID   (+) Sepsis without septic shock (Multi)       Clinical information reviewed:                   NPO Detail:  No data recorded     Physical Exam    Airway  Mallampati: II  TM distance: >3 FB  Neck ROM: full     Cardiovascular - normal exam     Dental        Pulmonary   (+) decreased breath sounds     Abdominal          Anesthesia Plan    History of general anesthesia?: yes  History of complications of general anesthesia?: no    ASA 3     general     The patient is a current smoker.    intravenous induction   Anesthetic plan and risks discussed with patient.  Use of blood products discussed with patient who consented to blood products.    Plan discussed with CRNA.

## 2024-10-08 ENCOUNTER — HOSPITAL ENCOUNTER (OUTPATIENT)
Facility: HOSPITAL | Age: 79
Setting detail: OUTPATIENT SURGERY
Discharge: HOME | End: 2024-10-08
Attending: UROLOGY | Admitting: UROLOGY
Payer: MEDICARE

## 2024-10-08 ENCOUNTER — PHARMACY VISIT (OUTPATIENT)
Dept: PHARMACY | Facility: CLINIC | Age: 79
End: 2024-10-08
Payer: COMMERCIAL

## 2024-10-08 ENCOUNTER — ANESTHESIA (OUTPATIENT)
Dept: OPERATING ROOM | Facility: HOSPITAL | Age: 79
End: 2024-10-08
Payer: MEDICARE

## 2024-10-08 VITALS
TEMPERATURE: 97.3 F | SYSTOLIC BLOOD PRESSURE: 128 MMHG | WEIGHT: 142 LBS | RESPIRATION RATE: 20 BRPM | HEIGHT: 68 IN | OXYGEN SATURATION: 95 % | DIASTOLIC BLOOD PRESSURE: 73 MMHG | HEART RATE: 65 BPM | BODY MASS INDEX: 21.52 KG/M2

## 2024-10-08 DIAGNOSIS — D49.4 BLADDER TUMOR: ICD-10-CM

## 2024-10-08 DIAGNOSIS — Z98.52 STATUS POST VASECTOMY: Primary | ICD-10-CM

## 2024-10-08 DIAGNOSIS — N21.0 BLADDER STONE: ICD-10-CM

## 2024-10-08 PROCEDURE — 52224 CYSTOSCOPY AND TREATMENT: CPT | Performed by: UROLOGY

## 2024-10-08 PROCEDURE — 3700000001 HC GENERAL ANESTHESIA TIME - INITIAL BASE CHARGE: Performed by: UROLOGY

## 2024-10-08 PROCEDURE — 52317 REMOVE BLADDER STONE: CPT | Performed by: UROLOGY

## 2024-10-08 PROCEDURE — 3700000002 HC GENERAL ANESTHESIA TIME - EACH INCREMENTAL 1 MINUTE: Performed by: UROLOGY

## 2024-10-08 PROCEDURE — 3600000008 HC OR TIME - EACH INCREMENTAL 1 MINUTE - PROCEDURE LEVEL THREE: Performed by: UROLOGY

## 2024-10-08 PROCEDURE — 2720000007 HC OR 272 NO HCPCS: Performed by: UROLOGY

## 2024-10-08 PROCEDURE — 2500000004 HC RX 250 GENERAL PHARMACY W/ HCPCS (ALT 636 FOR OP/ED): Performed by: ANESTHESIOLOGY

## 2024-10-08 PROCEDURE — 88341 IMHCHEM/IMCYTCHM EA ADD ANTB: CPT | Performed by: PATHOLOGY

## 2024-10-08 PROCEDURE — 7100000002 HC RECOVERY ROOM TIME - EACH INCREMENTAL 1 MINUTE: Performed by: UROLOGY

## 2024-10-08 PROCEDURE — 2500000004 HC RX 250 GENERAL PHARMACY W/ HCPCS (ALT 636 FOR OP/ED): Mod: JZ | Performed by: UROLOGY

## 2024-10-08 PROCEDURE — 3600000003 HC OR TIME - INITIAL BASE CHARGE - PROCEDURE LEVEL THREE: Performed by: UROLOGY

## 2024-10-08 PROCEDURE — 88305 TISSUE EXAM BY PATHOLOGIST: CPT | Performed by: PATHOLOGY

## 2024-10-08 PROCEDURE — 2500000004 HC RX 250 GENERAL PHARMACY W/ HCPCS (ALT 636 FOR OP/ED): Performed by: NURSE ANESTHETIST, CERTIFIED REGISTERED

## 2024-10-08 PROCEDURE — 7100000001 HC RECOVERY ROOM TIME - INITIAL BASE CHARGE: Performed by: UROLOGY

## 2024-10-08 PROCEDURE — 7100000009 HC PHASE TWO TIME - INITIAL BASE CHARGE: Performed by: UROLOGY

## 2024-10-08 PROCEDURE — 88305 TISSUE EXAM BY PATHOLOGIST: CPT | Mod: TC,PORLAB | Performed by: UROLOGY

## 2024-10-08 PROCEDURE — 88342 IMHCHEM/IMCYTCHM 1ST ANTB: CPT | Performed by: PATHOLOGY

## 2024-10-08 PROCEDURE — 7100000010 HC PHASE TWO TIME - EACH INCREMENTAL 1 MINUTE: Performed by: UROLOGY

## 2024-10-08 PROCEDURE — RXMED WILLOW AMBULATORY MEDICATION CHARGE

## 2024-10-08 RX ORDER — DIPHENHYDRAMINE HYDROCHLORIDE 50 MG/ML
12.5 INJECTION INTRAMUSCULAR; INTRAVENOUS ONCE AS NEEDED
Status: DISCONTINUED | OUTPATIENT
Start: 2024-10-08 | End: 2024-10-08 | Stop reason: HOSPADM

## 2024-10-08 RX ORDER — LIDOCAINE HYDROCHLORIDE 10 MG/ML
0.1 INJECTION, SOLUTION EPIDURAL; INFILTRATION; INTRACAUDAL; PERINEURAL ONCE
Status: DISCONTINUED | OUTPATIENT
Start: 2024-10-08 | End: 2024-10-08 | Stop reason: HOSPADM

## 2024-10-08 RX ORDER — ONDANSETRON HYDROCHLORIDE 2 MG/ML
INJECTION, SOLUTION INTRAVENOUS AS NEEDED
Status: DISCONTINUED | OUTPATIENT
Start: 2024-10-08 | End: 2024-10-08

## 2024-10-08 RX ORDER — LABETALOL HYDROCHLORIDE 5 MG/ML
5 INJECTION, SOLUTION INTRAVENOUS ONCE AS NEEDED
Status: DISCONTINUED | OUTPATIENT
Start: 2024-10-08 | End: 2024-10-08 | Stop reason: HOSPADM

## 2024-10-08 RX ORDER — DROPERIDOL 2.5 MG/ML
0.62 INJECTION, SOLUTION INTRAMUSCULAR; INTRAVENOUS ONCE AS NEEDED
Status: DISCONTINUED | OUTPATIENT
Start: 2024-10-08 | End: 2024-10-08 | Stop reason: HOSPADM

## 2024-10-08 RX ORDER — FAMOTIDINE 10 MG/ML
20 INJECTION INTRAVENOUS ONCE
Status: COMPLETED | OUTPATIENT
Start: 2024-10-08 | End: 2024-10-08

## 2024-10-08 RX ORDER — HYDRALAZINE HYDROCHLORIDE 20 MG/ML
5 INJECTION INTRAMUSCULAR; INTRAVENOUS EVERY 30 MIN PRN
Status: DISCONTINUED | OUTPATIENT
Start: 2024-10-08 | End: 2024-10-08 | Stop reason: HOSPADM

## 2024-10-08 RX ORDER — PROPOFOL 10 MG/ML
INJECTION, EMULSION INTRAVENOUS AS NEEDED
Status: DISCONTINUED | OUTPATIENT
Start: 2024-10-08 | End: 2024-10-08

## 2024-10-08 RX ORDER — MEPERIDINE HYDROCHLORIDE 25 MG/ML
12.5 INJECTION INTRAMUSCULAR; INTRAVENOUS; SUBCUTANEOUS EVERY 10 MIN PRN
Status: DISCONTINUED | OUTPATIENT
Start: 2024-10-08 | End: 2024-10-08 | Stop reason: HOSPADM

## 2024-10-08 RX ORDER — MORPHINE SULFATE 2 MG/ML
2 INJECTION, SOLUTION INTRAMUSCULAR; INTRAVENOUS EVERY 5 MIN PRN
Status: DISCONTINUED | OUTPATIENT
Start: 2024-10-08 | End: 2024-10-08 | Stop reason: HOSPADM

## 2024-10-08 RX ORDER — CEPHALEXIN 500 MG/1
500 CAPSULE ORAL 2 TIMES DAILY
Qty: 14 CAPSULE | Refills: 0 | Status: SHIPPED | OUTPATIENT
Start: 2024-10-08 | End: 2024-10-15

## 2024-10-08 RX ORDER — SODIUM CHLORIDE, SODIUM LACTATE, POTASSIUM CHLORIDE, CALCIUM CHLORIDE 600; 310; 30; 20 MG/100ML; MG/100ML; MG/100ML; MG/100ML
100 INJECTION, SOLUTION INTRAVENOUS CONTINUOUS
Status: DISCONTINUED | OUTPATIENT
Start: 2024-10-08 | End: 2024-10-08 | Stop reason: HOSPADM

## 2024-10-08 RX ORDER — HYDROCODONE BITARTRATE AND ACETAMINOPHEN 5; 325 MG/1; MG/1
1 TABLET ORAL EVERY 6 HOURS PRN
Qty: 20 TABLET | Refills: 0 | Status: SHIPPED | OUTPATIENT
Start: 2024-10-08

## 2024-10-08 RX ORDER — LIDOCAINE HCL/PF 100 MG/5ML
SYRINGE (ML) INTRAVENOUS AS NEEDED
Status: DISCONTINUED | OUTPATIENT
Start: 2024-10-08 | End: 2024-10-08

## 2024-10-08 RX ORDER — FENTANYL CITRATE 50 UG/ML
INJECTION, SOLUTION INTRAMUSCULAR; INTRAVENOUS AS NEEDED
Status: DISCONTINUED | OUTPATIENT
Start: 2024-10-08 | End: 2024-10-08

## 2024-10-08 RX ORDER — CEFAZOLIN SODIUM 2 G/100ML
2 INJECTION, SOLUTION INTRAVENOUS ONCE
Status: COMPLETED | OUTPATIENT
Start: 2024-10-08 | End: 2024-10-08

## 2024-10-08 RX ORDER — ONDANSETRON HYDROCHLORIDE 2 MG/ML
4 INJECTION, SOLUTION INTRAVENOUS ONCE AS NEEDED
Status: DISCONTINUED | OUTPATIENT
Start: 2024-10-08 | End: 2024-10-08 | Stop reason: HOSPADM

## 2024-10-08 RX ORDER — ALBUTEROL SULFATE 0.83 MG/ML
2.5 SOLUTION RESPIRATORY (INHALATION) ONCE AS NEEDED
Status: DISCONTINUED | OUTPATIENT
Start: 2024-10-08 | End: 2024-10-08 | Stop reason: HOSPADM

## 2024-10-08 RX ORDER — SODIUM CHLORIDE, SODIUM LACTATE, POTASSIUM CHLORIDE, CALCIUM CHLORIDE 600; 310; 30; 20 MG/100ML; MG/100ML; MG/100ML; MG/100ML
20 INJECTION, SOLUTION INTRAVENOUS CONTINUOUS
Status: DISCONTINUED | OUTPATIENT
Start: 2024-10-08 | End: 2024-10-08 | Stop reason: HOSPADM

## 2024-10-08 SDOH — HEALTH STABILITY: MENTAL HEALTH: CURRENT SMOKER: 1

## 2024-10-08 ASSESSMENT — PAIN SCALES - GENERAL
PAINLEVEL_OUTOF10: 0 - NO PAIN
PAIN_LEVEL: 0
PAINLEVEL_OUTOF10: 0 - NO PAIN

## 2024-10-08 ASSESSMENT — PAIN - FUNCTIONAL ASSESSMENT: PAIN_FUNCTIONAL_ASSESSMENT: 0-10

## 2024-10-08 NOTE — ANESTHESIA PROCEDURE NOTES
Airway  Date/Time: 10/8/2024 8:15 AM  Urgency: elective    Airway not difficult    Staffing  Performed: CRNA   Authorized by: ADAM George    Performed by: ADAM George  Patient location during procedure: OR    Indications and Patient Condition  Indications for airway management: anesthesia  Sedation level: deep  Preoxygenated: yes  Patient position: sniffing  Mask difficulty assessment: 1 - vent by mask    Final Airway Details  Final airway type: supraglottic airway      Successful airway: classic  Size 4     Number of attempts at approach: 1

## 2024-10-08 NOTE — ANESTHESIA POSTPROCEDURE EVALUATION
Patient: Cedric Mohan    Procedure Summary       Date: 10/08/24 Room / Location: [facility] / [facility]    Anesthesia Start: 0808 Anesthesia Stop: 0841    Procedures:       cystoscopy transuretheral resection bladder, tumor, bladder stone lithalopaxy ( Holmium laser for bladder stone ), Bladder Biopsy      . Diagnosis:       Bladder tumor      Bladder stone      (Bladder tumor [D49.4])      (Bladder stone [N21.0])    Surgeons: Js Ferrell MD Responsible Provider: ADAM George    Anesthesia Type: general ASA Status: 3            Anesthesia Type: general    Vitals Value Taken Time   /68 10/08/24 0911   Temp 36.3 °C (97.3 °F) 10/08/24 0845   Pulse 64 10/08/24 0911   Resp 20 10/08/24 0911   SpO2 96 % 10/08/24 0910   Vitals shown include unfiled device data.    Anesthesia Post Evaluation    Patient location during evaluation: PACU  Patient participation: complete - patient participated  Level of consciousness: awake  Pain score: 0  Pain management: adequate  Airway patency: patent  Cardiovascular status: acceptable  Respiratory status: acceptable  Hydration status: acceptable  Postoperative Nausea and Vomiting: none    No notable events documented.

## 2024-10-08 NOTE — OP NOTE
cystoscopy transuretheral resection bladder, tumor, bladder stone lithalopaxy ( Holmium laser for bladder stone ), Bladder Biopsy, . Operative Note     Date: 10/8/2024  OR Location: [facility]    Name: Cedric Mohan, : 1945, Age: 79 y.o., MRN: [id number], Sex: male    Diagnosis  Pre-op Diagnosis      * Bladder tumor [D49.4]     * Bladder stone [N21.0] Post-op Diagnosis     * Bladder tumor [D49.4]     * Bladder stone [N21.0]     Procedures  cystoscopy transuretheral resection bladder, tumor, bladder stone lithalopaxy ( Holmium laser for bladder stone ), Bladder Biopsy  42441 - UT LITHOLAPAXY SMPL/SM <2.5 CM    .  24257 - [place] CYSTO W/REMOVAL OF LESIONS SMALL      Surgeons      * Js Ferrell - Primary    Resident/Fellow/Other Assistant:  Surgeons and Role:  * No surgeons found with a matching role *    Procedure Summary  Anesthesia: Anesthesia type not filed in the log.  ASA: III  Anesthesia Staff: CRNA: HARITHA George-CRNA  Estimated Blood Loss: 0mL  Intra-op Medications:   Administrations occurring from 0745 to 0915 on 10/08/24:   Medication Name Total Dose   lactated Ringer's infusion Cannot be calculated   ceFAZolin (Ancef) 2 g in dextrose (iso)  mL 2 g              Anesthesia Record               Intraprocedure I/O Totals          Intake    lactated Ringer's infusion 300.00 mL    Total Intake 300 mL          Specimen:   ID Type Source Tests Collected by Time   1 : Bladder Lesion Tissue BLADDER BIOPSY SURGICAL PATHOLOGY EXAM Js Ferrell MD 10/8/2024 0830        Staff:   Circulator: Carlos Curtis Person: Jalen         Drains and/or Catheters: * None in log *    Tourniquet Times:         Implants:     Findings: #1 1 x 2 cm bladder stone, #2 1 x 2 cm irregularity of the bladder mucosa underneath stone    Indications: Cedric Mohan is an 79 y.o. male who is having surgery for Bladder tumor [D49.4]  Bladder stone [N21.0].     The patient was seen in the preoperative area. The risks, benefits,  complications, treatment options, non-operative alternatives, expected recovery and outcomes were discussed with the patient. The possibilities of reaction to medication, pulmonary aspiration, injury to surrounding structures, bleeding, recurrent infection, the need for additional procedures, failure to diagnose a condition, and creating a complication requiring transfusion or operation were discussed with the patient. The patient concurred with the proposed plan, giving informed consent.  The site of surgery was properly noted/marked if necessary per policy. The patient has been actively warmed in preoperative area. Preoperative antibiotics have been ordered and given within 1 hours of incision. Venous thrombosis prophylaxis have been ordered including bilateral sequential compression devices    Procedure Details: Patient was identified in preoperative holding area and brought into the room, placed on supine position. After general anesthesia was induced, patient was repositioned in a dorsal lithotomy position, genitalia area was prepped and draped in a routine standard fashion. A cystoscopy was performed with a 22F Olympus cystoscope, a 1 x 2 cm bladder stone located at posterior bladder wall and irregularity of the bladder mucosa underneath.  Stone was fragmented with the 1000 µm holmium laser fiber, cold cup biopsy was taken underneath irregular mucosa, then switched to resectoscope and entire area was fulgurated.  No active bleeding. Urine was clear and the bladder was emptied.  Patient estimated and returned to the PACU in a stable condition.  Complications:  None; patient tolerated the procedure well.    Disposition: PACU - hemodynamically stable.  Condition: stable       Plan  Keflex 500 mg twice a day for 7 days  Norco x 20  Will schedule 3-month surveillance cystoscopy in office    Attending Attestation: I performed the procedure.    Js Ferrell  Phone Number: [phone]

## 2024-10-18 LAB
LAB AP ASR DISCLAIMER: NORMAL
LABORATORY COMMENT REPORT: NORMAL
PATH REPORT.FINAL DX SPEC: NORMAL
PATH REPORT.GROSS SPEC: NORMAL
PATH REPORT.RELEVANT HX SPEC: NORMAL
PATH REPORT.TOTAL CANCER: NORMAL

## 2024-10-22 ENCOUNTER — TELEPHONE (OUTPATIENT)
Dept: UROLOGY | Facility: CLINIC | Age: 79
End: 2024-10-22
Payer: MEDICARE

## 2024-10-22 NOTE — TELEPHONE ENCOUNTER
----- Message from Js Ferrell sent at 10/18/2024  1:43 PM EDT -----  Bladder biopsy: No malignancy

## 2024-12-16 ENCOUNTER — HOSPITAL ENCOUNTER (EMERGENCY)
Facility: HOSPITAL | Age: 79
Discharge: HOME | End: 2024-12-17
Attending: EMERGENCY MEDICINE
Payer: MEDICARE

## 2024-12-16 DIAGNOSIS — R31.9 URINARY TRACT INFECTION WITH HEMATURIA, SITE UNSPECIFIED: Primary | ICD-10-CM

## 2024-12-16 DIAGNOSIS — N30.91 HEMORRHAGIC CYSTITIS: ICD-10-CM

## 2024-12-16 DIAGNOSIS — R33.8 ACUTE URINARY RETENTION: ICD-10-CM

## 2024-12-16 DIAGNOSIS — N39.0 URINARY TRACT INFECTION WITH HEMATURIA, SITE UNSPECIFIED: Primary | ICD-10-CM

## 2024-12-16 LAB
APPEARANCE UR: ABNORMAL
BILIRUB UR STRIP.AUTO-MCNC: NEGATIVE MG/DL
COLOR UR: ABNORMAL
GLUCOSE UR STRIP.AUTO-MCNC: NORMAL MG/DL
KETONES UR STRIP.AUTO-MCNC: ABNORMAL MG/DL
LEUKOCYTE ESTERASE UR QL STRIP.AUTO: ABNORMAL
NITRITE UR QL STRIP.AUTO: NEGATIVE
PH UR STRIP.AUTO: 6 [PH]
PROT UR STRIP.AUTO-MCNC: ABNORMAL MG/DL
RBC # UR STRIP.AUTO: ABNORMAL /UL
RBC #/AREA URNS AUTO: >20 /HPF
SP GR UR STRIP.AUTO: 1.02
UROBILINOGEN UR STRIP.AUTO-MCNC: NORMAL MG/DL
WBC #/AREA URNS AUTO: ABNORMAL /HPF

## 2024-12-16 PROCEDURE — 51702 INSERT TEMP BLADDER CATH: CPT

## 2024-12-16 PROCEDURE — 81001 URINALYSIS AUTO W/SCOPE: CPT | Performed by: EMERGENCY MEDICINE

## 2024-12-16 PROCEDURE — 99283 EMERGENCY DEPT VISIT LOW MDM: CPT | Performed by: EMERGENCY MEDICINE

## 2024-12-16 PROCEDURE — 87086 URINE CULTURE/COLONY COUNT: CPT | Mod: PORLAB | Performed by: EMERGENCY MEDICINE

## 2024-12-16 PROCEDURE — 2500000002 HC RX 250 W HCPCS SELF ADMINISTERED DRUGS (ALT 637 FOR MEDICARE OP, ALT 636 FOR OP/ED): Performed by: EMERGENCY MEDICINE

## 2024-12-16 PROCEDURE — 2500000001 HC RX 250 WO HCPCS SELF ADMINISTERED DRUGS (ALT 637 FOR MEDICARE OP): Performed by: EMERGENCY MEDICINE

## 2024-12-16 RX ORDER — TAMSULOSIN HYDROCHLORIDE 0.4 MG/1
0.4 CAPSULE ORAL ONCE
Status: COMPLETED | OUTPATIENT
Start: 2024-12-16 | End: 2024-12-16

## 2024-12-16 RX ORDER — LIDOCAINE HYDROCHLORIDE 20 MG/ML
JELLY TOPICAL
Status: DISCONTINUED
Start: 2024-12-16 | End: 2024-12-17 | Stop reason: HOSPADM

## 2024-12-16 RX ORDER — ACETAMINOPHEN 325 MG/1
975 TABLET ORAL ONCE
Status: COMPLETED | OUTPATIENT
Start: 2024-12-16 | End: 2024-12-16

## 2024-12-16 ASSESSMENT — COLUMBIA-SUICIDE SEVERITY RATING SCALE - C-SSRS
6. HAVE YOU EVER DONE ANYTHING, STARTED TO DO ANYTHING, OR PREPARED TO DO ANYTHING TO END YOUR LIFE?: NO
1. IN THE PAST MONTH, HAVE YOU WISHED YOU WERE DEAD OR WISHED YOU COULD GO TO SLEEP AND NOT WAKE UP?: NO
2. HAVE YOU ACTUALLY HAD ANY THOUGHTS OF KILLING YOURSELF?: NO

## 2024-12-16 ASSESSMENT — PAIN DESCRIPTION - LOCATION: LOCATION: ABDOMEN

## 2024-12-16 ASSESSMENT — PAIN DESCRIPTION - FREQUENCY: FREQUENCY: CONSTANT/CONTINUOUS

## 2024-12-16 ASSESSMENT — PAIN - FUNCTIONAL ASSESSMENT: PAIN_FUNCTIONAL_ASSESSMENT: 0-10

## 2024-12-16 ASSESSMENT — PAIN SCALES - GENERAL: PAINLEVEL_OUTOF10: 8

## 2024-12-16 ASSESSMENT — PAIN DESCRIPTION - PAIN TYPE: TYPE: ACUTE PAIN

## 2024-12-16 ASSESSMENT — PAIN DESCRIPTION - ORIENTATION: ORIENTATION: LOWER

## 2024-12-16 ASSESSMENT — PAIN DESCRIPTION - DESCRIPTORS: DESCRIPTORS: PRESSURE

## 2024-12-17 VITALS
DIASTOLIC BLOOD PRESSURE: 78 MMHG | HEART RATE: 89 BPM | WEIGHT: 146 LBS | RESPIRATION RATE: 18 BRPM | BODY MASS INDEX: 22.13 KG/M2 | TEMPERATURE: 98.6 F | OXYGEN SATURATION: 95 % | HEIGHT: 68 IN | SYSTOLIC BLOOD PRESSURE: 135 MMHG

## 2024-12-17 LAB — HOLD SPECIMEN: NORMAL

## 2024-12-17 PROCEDURE — 2500000001 HC RX 250 WO HCPCS SELF ADMINISTERED DRUGS (ALT 637 FOR MEDICARE OP): Performed by: EMERGENCY MEDICINE

## 2024-12-17 RX ORDER — CEPHALEXIN 500 MG/1
500 CAPSULE ORAL 4 TIMES DAILY
Qty: 40 CAPSULE | Refills: 0 | Status: SHIPPED | OUTPATIENT
Start: 2024-12-17 | End: 2024-12-27

## 2024-12-17 RX ORDER — CEPHALEXIN 250 MG/1
500 CAPSULE ORAL ONCE
Status: COMPLETED | OUTPATIENT
Start: 2024-12-17 | End: 2024-12-17

## 2024-12-17 NOTE — ED PROVIDER NOTES
HPI   Chief Complaint   Patient presents with    Urinary Retention    Blood in Urine     Pt states 12/14 self cathed and had some blood in urine since then has started having blood clots and increasing in amount  now not passing urine other than a dribble  with clots        HPI:  79-year-old male with history of A-fib but not on any blood thinners except for baby aspirin daily as well as history of urinary retention in the past presents to the emergency department with complaint of difficulty urinating he says it started on Friday he had some disposable catheter so he self cath on Friday to help relieve his bladder but noticed that it was a little blood-tinged she says he since been having issues with passing blood clots and pulled a blood clot out when he removed the catheter.  He says up until about 10 minutes ago he could not urinate but he was so uncomfortable he forced multiple blood clots out to relieve some of his urine he also feels like it is burning with some suprapubic discomfort denies any nausea vomiting no fevers or chills no back pain    Limitations to history: None  Independent Historians: None  External Records Reviewed: EMR record  ------------------------------------------------------------------------------------------------------------------------------------------  ROS: a ten point review of systems was performed and negative except as per HPI.  ------------------------------------------------------------------------------------------------------------------------------------------  PMH / PSH: as per HPI, reviewed in EMR and discussed with the patient []  MEDS:  reviewed in EMR and discussed with the patient []  ALLERGIES: reviewed in EMR[]  SocH:  as per HPI, otherwise reviewed in EMR []  FH:  as per HPI, otherwise reviewed in EMR []  ------------------------------------------------------------------------------------------------------------------------------------------  Physical Exam:  VS:  As documented in the triage note and EMR flowsheet from this visit was reviewed  General: Well appearing. No acute distress.   Eyes:  Extraocular movements grossly intact. No scleral icterus.   HEENT: Atraumatic. Normocephalic.    Neck: Supple. No gross masses  CV: RRR, audible S1/S2, 2+ symmetric peripheral pulses  Resp: Clear to auscultation bilaterally. No respiratory distress.  Non-labored respirations  GI: Soft, mild suprapubic discomfort, non-distended, no rebound or gaurding  MSK: Symmetric muscle bulk. No gross step offs or deformities.  Skin: Warm, dry, no obvious rash.  Neuro: Speech fluent. Awake. Alert. Appropriate conversation.  Psych: Appropriate mood and affect for situation  ------------------------------------------------------------------------------------------------------------------------------------------  Hospital Course / Medical Decision Making:  Patient presenting with hematuria and urinary retention.  Not currently on any blood thinning medications.  Had difficulty passing coudé three-way Isaac catheter however was able to place a coudé single-lumen.  Patient has been draining no obvious clots seen in the urine but it is hemorrhagic.  I believe he likely has a hemorrhagic cystitis given that he is having burning and suprapubic discomfort he is feeling much better with decompression of his urinary bladder is comfortable going home with a Isaac catheter I have given him his first dose of Keflex here and prescription for Keflex sent to the pharmacy of preference he agrees to call Dr. Das his urologist tomorrow for close outpatient follow-up    Patient care discussed with: [Admitting team, consultants, social work, THRIVE, SANE, radiology]  Social Determinants affecting care: [Problems affording transportation, inability to afford prescriptions, lack of housing, lack of insurance]    Final diagnosis and disposition: [] Hemorrhagic cystitis, acute urinary retention            Adele Land  MD                          Patient History   Past Medical History:   Diagnosis Date    Other conditions influencing health status     Arthritis    Other conditions influencing health status     Bladder Cancer    Other conditions influencing health status     Prostate Cancer     Past Surgical History:   Procedure Laterality Date    HERNIA REPAIR  07/05/2013    Hernia Repair    HERNIA REPAIR  06/11/2013    Hernia Repair    OTHER SURGICAL HISTORY  05/23/2013    General Surgery    OTHER SURGICAL HISTORY  03/17/2017    Sialodochoplasty    OTHER SURGICAL HISTORY  06/11/2013    Tonsillectomy Over Age 13    OTHER SURGICAL HISTORY  06/11/2013    Cystoscopy With Resection Of Tumor     Family History   Problem Relation Name Age of Onset    Breast cancer Mother      Other (stroke syndrome) Mother      Prostate cancer Brother       Social History     Tobacco Use    Smoking status: Former     Types: Cigarettes     Passive exposure: Current    Smokeless tobacco: Never   Vaping Use    Vaping status: Never Used   Substance Use Topics    Alcohol use: Never    Drug use: Never       Physical Exam   ED Triage Vitals [12/16/24 2034]   Temperature Heart Rate Respirations BP   37 °C (98.6 °F) 94 20 (!) 179/97      Pulse Ox Temp Source Heart Rate Source Patient Position   95 % Tympanic Monitor Sitting      BP Location FiO2 (%)     Left arm --       Physical Exam      ED Course & MDM                  No data recorded     Breonna Coma Scale Score: 15 (12/16/24 2032 : Penny Vail, BELEM)                           Medical Decision Making      Procedure  Procedures     Adele Land MD  12/17/24 0137

## 2024-12-20 LAB — BACTERIA UR CULT: ABNORMAL

## 2024-12-23 ENCOUNTER — TELEPHONE (OUTPATIENT)
Dept: PHARMACY | Facility: HOSPITAL | Age: 79
End: 2024-12-23
Payer: MEDICARE

## 2024-12-23 DIAGNOSIS — N30.01 ACUTE CYSTITIS WITH HEMATURIA: Primary | ICD-10-CM

## 2024-12-23 RX ORDER — AMOXICILLIN 875 MG/1
875 TABLET, FILM COATED ORAL 2 TIMES DAILY
Qty: 14 TABLET | Refills: 0 | Status: SHIPPED | OUTPATIENT
Start: 2024-12-23 | End: 2024-12-30

## 2024-12-23 NOTE — PROGRESS NOTES
EDPD Note: Bug-Drug Mismatch    Contacted Mr./Mrs./Ms. Cedric Mohan regarding a positive urine culture/result that was taken during their recent emergency room visit. I completed education with patient. The patient is not being treated appropriately with Keflex.     The following prescription was sent to the patient's preferred pharmacy. No further follow up needed from EDPD Team.   Drug: amoxicillin  Si mg PO BID x 7 days  Qty: 14  Days Supply: 7    Susceptibility data from last 90 days.  Collected Specimen Info Organism Ampicillin Ciprofloxacin Levofloxacin Nitrofurantoin Penicillin Trimethoprim/Sulfamethoxazole Vancomycin   24 Urine from Clean Catch/Voided Enterococcus faecalis  S  S  S  S  S  R  S     Patient presented to ED on  with complaints of urine retention, hematuria and dysuria. He was discharged on Keflex 500 mg qid for 10 days which is not appropriate to treat enterococcus faecalis. Recommend amoxicillin 875 mg BID x 7 days per  protocol.     SANDY GALICIA

## 2025-01-10 ENCOUNTER — APPOINTMENT (OUTPATIENT)
Dept: UROLOGY | Facility: CLINIC | Age: 80
End: 2025-01-10
Payer: MEDICARE

## 2025-01-10 VITALS
DIASTOLIC BLOOD PRESSURE: 77 MMHG | SYSTOLIC BLOOD PRESSURE: 146 MMHG | BODY MASS INDEX: 22.13 KG/M2 | HEART RATE: 99 BPM | WEIGHT: 146 LBS | HEIGHT: 68 IN

## 2025-01-10 DIAGNOSIS — C67.9 MALIGNANT NEOPLASM OF URINARY BLADDER, UNSPECIFIED SITE (MULTI): ICD-10-CM

## 2025-01-10 DIAGNOSIS — N21.0 BLADDER STONE: Primary | ICD-10-CM

## 2025-01-10 DIAGNOSIS — R35.0 BENIGN PROSTATIC HYPERPLASIA WITH URINARY FREQUENCY: ICD-10-CM

## 2025-01-10 DIAGNOSIS — N40.1 BENIGN PROSTATIC HYPERPLASIA WITH URINARY FREQUENCY: ICD-10-CM

## 2025-01-10 LAB
POC APPEARANCE, URINE: CLEAR
POC BILIRUBIN, URINE: NEGATIVE
POC BLOOD, URINE: ABNORMAL
POC COLOR, URINE: YELLOW
POC GLUCOSE, URINE: NEGATIVE MG/DL
POC KETONES, URINE: NEGATIVE MG/DL
POC LEUKOCYTES, URINE: NEGATIVE
POC NITRITE,URINE: NEGATIVE
POC PH, URINE: 6 PH
POC PROTEIN, URINE: NEGATIVE MG/DL
POC SPECIFIC GRAVITY, URINE: <=1.005
POC UROBILINOGEN, URINE: 0.2 EU/DL

## 2025-01-10 PROCEDURE — 52000 CYSTOURETHROSCOPY: CPT | Performed by: UROLOGY

## 2025-01-10 PROCEDURE — 81003 URINALYSIS AUTO W/O SCOPE: CPT | Performed by: UROLOGY

## 2025-01-10 RX ORDER — CEPHALEXIN 500 MG/1
500 CAPSULE ORAL ONCE
Status: COMPLETED | OUTPATIENT
Start: 2025-01-10 | End: 2025-01-10

## 2025-01-10 RX ORDER — LIDOCAINE HYDROCHLORIDE 20 MG/ML
1 JELLY TOPICAL ONCE
Status: COMPLETED | OUTPATIENT
Start: 2025-01-10 | End: 2025-01-10

## 2025-01-10 RX ADMIN — CEPHALEXIN 500 MG: 500 CAPSULE ORAL at 13:40

## 2025-01-10 RX ADMIN — LIDOCAINE HYDROCHLORIDE 1 APPLICATION: 20 JELLY TOPICAL at 13:40

## 2025-01-10 NOTE — PROGRESS NOTES
Chief Complaint   Patient presents with    Bladder Cancer     Patient is here today for cysto for bladder cancer history.        Physical Exam     TODAYS LAB RESULTS:    Glucose, Urine  NEGATIVE mg/dl NEGATIVE NEGATIVE VC, R NEGATIVE NEGATIVE NEGATIVE   POC Bilirubin, Urine  NEGATIVE NEGATIVE NEGATIVE VC, R NEGATIVE NEGATIVE NEGATIVE   POC Ketones, Urine  NEGATIVE mg/dl NEGATIVE NEGATIVE VC, R NEGATIVE 15 (1+) Abnormal  TRACE Abnormal    POC Specific Gravity, Urine  1.005 - 1.035 <=1.005 1.010 VC, R 1.010 1.020 1.020   POC Blood, Urine  NEGATIVE LARGE (3+) Abnormal  LARGE (3+) Abnormal  VC, R NEGATIVE NEGATIVE TRACE-Lysed Abnormal    POC PH, Urine  No Reference Range Established PH 6.0 6.0 VC, R 6.5 6.5 6.0   POC Protein, Urine  NEGATIVE mg/dl NEGATIVE TRACE Abnormal  R VC, R NEGATIVE R NEGATIVE R 30 (1+) R   POC Urobilinogen, Urine  0.2, 1.0 EU/DL 0.2 0.2 VC, R 0.2 0.2 0.2   Poc Nitrite, Urine  NEGATIVE NEGATIVE NEGATIVE VC, R NEGATIVE NEGATIVE NEGATIVE   POC Leukocytes, Urine  NEGATIVE NEGATIVE SMALL (1+) Abnormal  VC, R NEGATIVE NEGATIVE MODERATE (2+) Abnormal        Lab Results   Component Value Date    PSA 0.25 09/13/2023    PSA 0.22 09/21/2022    PSA 0.25 04/14/2022        ASSESSMENT&PLAN:      IMPRESSIONS:

## 2025-01-10 NOTE — PROGRESS NOTES
01/10/2025  Cystoscopy    Gross hematuria intermittently    A cystoscopy was performed under local without difficulties    Findings: Mild bulbar urethral stricture, moderate prostate urethral stricture, moderate enlarged prostate; some erythematous bladder mucosa posterior wall, small calcification posterior wall x 2, no obvious tumor in the bladder    Pain evaluation: 0/10 before, 2/10 after.    We discussed cystoscopy finding: Urethral stricture, erythematous bladder mucosa, no obvious tumor  We discussed continued surveillance cystoscopy in 3 months  All the questions were answered, the patient expressed understanding and agreed to the plan.    Impression  Urethral stricture  Bladder stone  Bladder tumor recurrence  Urinary retention  BPH  Urinary tract infection  Bladder cancer status post TURBT    Plan  Cystoscopy in 3 months      Chief Complaint   Patient presents with    Bladder Cancer     Patient is here today for cysto for bladder cancer history.  Pt states he had a uti and he had blood in his urine         Physical Exam     TODAYS LAB RESULTS:  ontains abnormal data POCT UA Automated manually resulted  Order: [id number]   Status: Final result       Visible to patient: Yes (not seen)       Next appt: 03/24/2025 at 11:30 AM in Cardiology (Radhika Conley, APRN-CNP)       Dx: Malignant neoplasm of urinary bladder...    0 Result Notes           Component  Ref Range & Units 13:46  (1/10/25) 3 mo ago  (9/13/24) 5 mo ago  (8/2/24) 9 mo ago  (4/12/24) 9 mo ago  (3/20/24) 1 yr ago  (10/12/23)   POC Color, Urine  Straw, Yellow, Light-Yellow Yellow Yellow VC, R  Yellow Yellow   POC Appearance, Urine  Clear Clear Clear VC, R  Clear Clear   POC Glucose, Urine  NEGATIVE mg/dl NEGATIVE NEGATIVE VC, R NEGATIVE NEGATIVE NEGATIVE   POC Bilirubin, Urine  NEGATIVE NEGATIVE NEGATIVE VC, R NEGATIVE NEGATIVE NEGATIVE   POC Ketones, Urine  NEGATIVE mg/dl NEGATIVE NEGATIVE VC, R NEGATIVE 15 (1+) Abnormal  TRACE Abnormal    POC  Specific Gravity, Urine  1.005 - 1.035 <=1.005 1.010 VC, R 1.010 1.020 1.020   POC Blood, Urine  NEGATIVE LARGE (3+) Abnormal  LARGE (3+) Abnormal  VC, R NEGATIVE NEGATIVE TRACE-Lysed Abnormal    POC PH, Urine  No Reference Range Established PH 6.0 6.0 VC, R 6.5 6.5 6.0   POC Protein, Urine  NEGATIVE mg/dl NEGATIVE TRACE Abnormal  R VC, R NEGATIVE R NEGATIVE R 30 (1+) R   POC Urobilinogen, Urine  0.2, 1.0 EU/DL 0.2 0.2 VC, R 0.2 0.2 0.2   Poc Nitrite, Urine  NEGATIVE NEGATIVE NEGATIVE VC, R NEGATIVE NEGATIVE NEGATIVE   POC Leukocytes, Urine  NEGATIVE NEGATIVE SMALL (1+) Abnormal  VC, R NEGATIVE NEGATIVE MODERATE (2+) Abnormal           ASSESSMENT&PLAN:      IMPRESSIONS:          09/13/2024   cystoscopy     Voiding well and no blood in urine     A cystoscopy was performed under local without difficulty     Findings: Normal anterior urethra, mild enlarged prostate, 1 x 2 cm bladder stone attached to the mucosa, mucosal irregularity posterior right lateral wall     Pain evaluation: 0/10 before, 2/10 after.     Antibiotics Keflex 500 mg given     We discussed benign prostate hypertrophy urinary retention urinary tract infection  We discussed the voiding trial today  We discussed the surveillance cystoscopy  All the questions were answered, the patient expressed understanding and agreed to the plan.     Impression  Bladder stone  Bladder tumor recurrence  Urinary retention  BPH  Urinary tract infection  Bladder cancer status post TURBT     Plan  OR cystoscopy bladder stone lithotripsy, possible TURBT, bladder biopsy         Chief Complaint   Patient presents with    Bladder Prolapse         Physical Exam      TODAYS LAB RESULTS:  ontains abnormal data POCT UA Automated manually resulted  Order: [id number]   Status: Final result       Visible to patient: Yes (not seen)       Next appt: 09/24/2024 at 01:00 PM in Cardiology (Lulu Good MD)       Dx: Urinary frequency    0 Result Notes                Component  Ref  Range & Units 14:27  (9/13/24) 1 mo ago  (8/2/24) 5 mo ago  (4/12/24) 5 mo ago  (3/20/24) 11 mo ago  (10/12/23)   POC Color, Urine  Straw, Yellow, Light-Yellow Yellow VC, R   Yellow Yellow   POC Appearance, Urine  Clear Clear VC, R   Clear Clear   POC Glucose, Urine  NEGATIVE mg/dl NEGATIVE VC, R NEGATIVE NEGATIVE NEGATIVE   POC Bilirubin, Urine  NEGATIVE NEGATIVE VC, R NEGATIVE NEGATIVE NEGATIVE   POC Ketones, Urine  NEGATIVE mg/dl NEGATIVE VC, R NEGATIVE 15 (1+) Abnormal  TRACE Abnormal    POC Specific Gravity, Urine  1.005 - 1.035 1.010 VC, R 1.010 1.020 1.020   POC Blood, Urine  NEGATIVE LARGE (3+) Abnormal  VC, R NEGATIVE NEGATIVE TRACE-Lysed Abnormal    POC PH, Urine  No Reference Range Established PH 6.0 VC, R 6.5 6.5 6.0   POC Protein, Urine  NEGATIVE, 30 (1+) mg/dl TRACE Abnormal  VC, R NEGATIVE NEGATIVE 30 (1+)   POC Urobilinogen, Urine  0.2, 1.0 EU/DL 0.2 VC, R 0.2 0.2 0.2   Poc Nitrite, Urine  NEGATIVE NEGATIVE VC, R NEGATIVE NEGATIVE NEGATIVE   POC Leukocytes, Urine  NEGATIVE SMALL (1+) Abnormal  VC, R NEGATIVE NEGATIVE MODERATE (2+) Abnormal                 Specimen Collected: 09/13/24 14:27 Last Resulted: 09/13/24 14:27        Lab Flowsheet        Order Details        View Encounter        Lab and Collection Details        Routing        Result History     View All Conversations on this Encounter        VC=Value has a corrected status   R=Reference range differs from displayed range        Result Care Coordination       Patient Communication      Add Comments   Add Notifications  Back to Top           POCT UA Automated manually resulted: Patient Communication      Add Comments   Add Notifications       Lab Component SmartPhrase Guide     POCT UA Automated manually resulted (Order #[id number]) on 9/13/24      Specimen Date Taken Specimen Time Taken Specimen Received Date Specimen Received Time Result Date Result Time   Sep 13, 2024  2:27 PM     Sep 13, 2024  2:27 PM         ASSESSMENT&PLAN:         IMPRESSIONS:    08/12/2024  Patient developed UTI symptoms and urinary retention, Isaac was inserted and on antibiotics     Exam: Isaac catheter in place for 10 days, urine clear yellow     Catheter was removed without difficulty     We discussed benign prostate hypertrophy urinary retention urinary tract infection  We discussed the voiding trial today  We discussed the surveillance cystoscopy  All the questions were answered, the patient expressed understanding and agreed to the plan.     Impression  Urinary retention  BPH  Urinary tract infection  Bladder cancer status post TURBT     Plan  Voiding trial today, call back 3 PM  Continue current antibiotics  Surveillance cystoscopy             Chief Complaint   Patient presents with    Benign Prostatic Hypertrophy       Patient is here today for 1 week follow up.  Pt states that he only took the ATB for 2.5 days the urine in the bag looked black, and after he stopped the antibiotic it cleared up.  He will also need a script for tamsulosin.         Physical Exam      TODAYS LAB RESULTS:            Lab Results   Component Value Date     PSA 0.25 09/13/2023     PSA 0.22 09/21/2022     PSA 0.25 04/14/2022         CT SCAN 5-  Narrative & Impression   STUDY:  CT Abdomen and Pelvis with IV Contrast; 5/13/2024 1:20 PM  INDICATION:  Leukocytosis.  COMPARISON:  CT urogram 12/5/2023, 12/14/2022.  ACCESSION NUMBER(S):  [id number]  ORDERING CLINICIAN:  YINKA GONZALEZ  TECHNIQUE:  CT of the abdomen and pelvis was performed.  Contiguous axial images  were obtained at 3 mm slice thickness through the abdomen and pelvis.   Coronal and sagittal reconstructions at 3 mm slice thickness were  performed.  Omnipaque 350--75 mL was administered intravenously.    FINDINGS:  LOWER CHEST:  No cardiomegaly.  No pericardial effusion.  Chronic emphysematous  changes are seen in both lungs.  There are infiltrates in the right  middle lobe.  Infiltrate and/or dependent changes noted in  the  posterior right lower lobe.  These findings were not seen on the prior  study of 12/5/2023.     ABDOMEN:     LIVER:  No hepatomegaly.  Smooth surface contour.  Normal attenuation.     BILE DUCTS:  No intrahepatic or extrahepatic biliary ductal dilatation.     GALLBLADDER:  The gallbladder is normal.  STOMACH:  Study is diffusely thickened however it is not well distended and this  is most likely normal..     PANCREAS:  No masses or ductal dilatation.     SPLEEN:  No splenomegaly or focal splenic lesion.     ADRENAL GLANDS:  There is thickening of the left adrenal gland, most likely  representing hypertrophy.     KIDNEYS AND URETERS:  There are multiple renal cysts as seen previously  No renal or  ureteral calculi.     PELVIS:     BLADDER:  Chronic calcifications noted in the dependent portion of the bladder.   Chronic thickening of the bladder base which may be the result of  encroachment by the prostate.  Prostate seed implants are also noted  in place.     REPRODUCTIVE ORGANS:  No abnormalities identified.     BOWEL:  There is diverticulosis of the sigmoid without evidence of  inflammation.  There is a normal appendix.     VESSELS:  No abnormalities identified.  Abdominal aorta is normal in caliber.      PERITONEUM/RETROPERITONEUM/LYMPH NODES:  No free fluid.  No pneumoperitoneum.  No lymphadenopathy.     ABDOMINAL WALL:  No abnormalities identified.  SOFT TISSUES:   No abnormalities identified.     BONES:  No acute fracture or aggressive osseous lesion.  Multilevel moderately  advanced degenerative disc disease noted.  IMPRESSION:  No focal acute pathology demonstrated in the abdomen and pelvis.  Chronic lateral calcifications, prostatic seed implants and thickening  at the base of the bladder all stable.  Acute appearing infiltrate in the right middle lobe and probable  dependent changes at the right lung base.  These findings are  superimposed on chronic emphysematous disease..  Signed by Rory Orr          ASSESSMENT&PLAN:        IMPRESSIONS:       08/02/2024 [facility] PCNA  Complaining gross hematuria with clots over the old month.  Off Eliquis for 1 month     A 18 [place] coudé catheter was inserted, light pink urine now, no clots     We discussed history of bladder cancer, gross hematuria  We discussed short-term Isaac catheter, off Eliquis  We discussed post form cystoscopy  All the questions were answered, the patient expressed understanding and agreed to the plan.     Impression  Hematuria with clots  Bladder cancer  BPH     Plan  Isaac catheter to bag  Increase fluid intake  Cystoscopy next week  Continue Flomax 0.4 mg daily     04/12/2024  History of bladder cancer, 1 month post surveillance cystoscopy.  Voiding okay on Flomax 0.4 mg     Patient has no nausea, no vomiting, no fever.     YUKO: Deferred     PSA: Normal to updated     We discussed bladder cancer, surveillance cystoscopy every 3 to 4 months  We discussed benign prostate hypertrophy continue Flomax 0.4 mg daily, may consider 0.8 mg as well  We discussed PSA screening  All the questions were answered, the patient expressed understanding and agreed to the plan.     Impression  Bladder cancer  BPH     Plan  Cystoscopy in 4-month  Continue Flomax 0.4 mg daily  May consider 0.8 mg daily                 04/12/2024  History of bladder cancer, 1 month post surveillance cystoscopy.  Voiding okay on Flomax 0.4 mg     Patient has no nausea, no vomiting, no fever.     YUKO: Deferred     PSA: Normal to updated     We discussed bladder cancer, surveillance cystoscopy every 3 to 4 months  We discussed benign prostate hypertrophy continue Flomax 0.4 mg daily, may consider 0.8 mg as well  We discussed PSA screening  All the questions were answered, the patient expressed understanding and agreed to the plan.     Impression  Bladder cancer  BPH     Plan  Cystoscopy in 4-month  Continue Flomax 0.4 mg daily  May consider 0.8 mg daily        I spent 25 minutes with  this patient. Greater than 50% of this time was spent in counseling and/or coordination of care             Chief Complaint   Patient presents with    Bladder Lesion       Patient here to establish with a urologist in [place]. He previously saw Dr. Marlow at [facility], but now wants to be seen here. Cystoscopy was performed 03/20/2024 per Dr. Marlow. Patient is here to discuss TURBT vs surveillance cystoscopy in 3 months. Patient has a hx of prostate cancer and bladder cancer.          Physical Exam      TODAYS LAB RESULTS:     PSA 09/13/2023  0.25     POC Glucose, Urine  NEGATIVE mg/dl NEGATIVE   POC Bilirubin, Urine  NEGATIVE NEGATIVE   POC Ketones, Urine  NEGATIVE mg/dl NEGATIVE   POC Specific Gravity, Urine  1.005 - 1.035 1.010   POC Blood, Urine  NEGATIVE NEGATIVE   POC PH, Urine  No Reference Range Established PH 6.5   POC Protein, Urine  NEGATIVE, 30 (1+) mg/dl NEGATIVE   POC Urobilinogen, Urine  0.2, 1.0 EU/DL 0.2   Poc Nitrite, Urine  NEGATIVE NEGATIVE   POC Leukocytes, Urine  NEGATIVE NEGATIVE      ASSESSMENT&PLAN:        IMPRESSIONS:        HPI  79 y.o. male who presents for cystoscopic evaluation for bladder lesion Pt has a CT urography, conducted on 12/5/2023, revealed :      Interval resolution of previously visualized areas of soft tissue filling defects of the right-greater-than-left posterior urinary bladder wall without evidence of new abnormal filling defects along the urinary bladder wall on delayed phase imaging. New coarse calcifications within the posterior aspect of the urinary bladder lumen are favored to be secondary to patient's history of intravesical mitomycin treatment (likely treated disease).  Additionally, asymmetric urinary bladder wall thickening along the  right-greater-than-left posterior urinary bladder wall is also  favored to be secondary to posttreatment changes, although underlying  neoplasm can not be excluded. Attention on follow-up is recommended.        Review of Systems      All systems were reviewed. Anything negative was noted in the HPI.     Objective   Physical Exam     General: Well developed, well nourished, alert and cooperative, appears in no acute distress   Eyes: Non-injected conjunctiva, sclera clear, no proptosis   Cardiac: Extremities are warm and well perfused. No edema, cyanosis or pallor   Lungs: Breathing is easy, non-labored. Speaking in clear and complete sentences. Normal diaphragmatic movement   MSK: Ambulatory with steady gait, unassisted   Neuro: Alert and oriented to person, place, and time   Psych: Demonstrates good judgment and reason, without hallucinations, abnormal affect or abnormal behaviors   Skin: No obvious lesions, no rashes       No CVA tenderness bilaterally   No suprapubic pain or discomfort         Medical History           Past Medical History:   Diagnosis Date    Other conditions influencing health status       Arthritis    Other conditions influencing health status       Bladder Cancer    Other conditions influencing health status       Prostate Cancer               Surgical History             Past Surgical History:   Procedure Laterality Date    HERNIA REPAIR   07/05/2013     Hernia Repair    HERNIA REPAIR   06/11/2013     Hernia Repair    OTHER SURGICAL HISTORY   05/23/2013     General Surgery    OTHER SURGICAL HISTORY   03/17/2017     Sialodochoplasty    OTHER SURGICAL HISTORY   06/11/2013     Tonsillectomy Over Age 13    OTHER SURGICAL HISTORY   06/11/2013     Cystoscopy With Resection Of Tumor         3/20/24 Dr. Marlow  Procedure:     The patient was prepped using a Betadine solution. Lidocaine jelly was instilled into the urethra. The flexible cystoscope was sterilely inserted into the urethra and formal cystoscopy performed in a systematic fashion. For detailed findings of the procedure, please see Dr. Marlow’s remarks below  Scope A used, Cipro 500 mg p.o. given           Assessment/Plan   Cystoscopic evaluation for Bladder lesion on  CT     79 y.o. male who presents for the above condition, We had a very long and extensive discussion with the patient regarding the pathophysiology, differential diagnosis, risk factor, management, natural history, incidence and diagnostic work-up of the condition.      Surgical intervention will be scheduled to remove scar tissue and potentially send tissue for pathological examination to rule out malignancy     Plan:  - TURBT Vs observation and fu in 4 months with another cysto  - Pt wants to follow up his care at [facility]      PSA  9/13/2023 0.25  9/21/2022 0.22  ......     Surgery  10/8/2024 TURBT, litholapaxy  7/19/2023 cystoscopy TURBT, mitomycin instillation dr Marlow  1/11/2023 TURBT and mitomycin intravesical instillation dr Marlow

## 2025-03-24 ENCOUNTER — OFFICE VISIT (OUTPATIENT)
Dept: CARDIOLOGY | Facility: HOSPITAL | Age: 80
End: 2025-03-24
Payer: MEDICARE

## 2025-03-24 VITALS
WEIGHT: 146 LBS | HEIGHT: 68 IN | SYSTOLIC BLOOD PRESSURE: 140 MMHG | DIASTOLIC BLOOD PRESSURE: 82 MMHG | BODY MASS INDEX: 22.13 KG/M2 | HEART RATE: 102 BPM

## 2025-03-24 DIAGNOSIS — I48.91 ATRIAL FIBRILLATION WITH RAPID VENTRICULAR RESPONSE (MULTI): Primary | ICD-10-CM

## 2025-03-24 DIAGNOSIS — I10 PRIMARY HYPERTENSION: ICD-10-CM

## 2025-03-24 PROCEDURE — 99214 OFFICE O/P EST MOD 30 MIN: CPT | Performed by: CLINICAL NURSE SPECIALIST

## 2025-03-24 PROCEDURE — 1036F TOBACCO NON-USER: CPT | Performed by: CLINICAL NURSE SPECIALIST

## 2025-03-24 PROCEDURE — 1123F ACP DISCUSS/DSCN MKR DOCD: CPT | Performed by: CLINICAL NURSE SPECIALIST

## 2025-03-24 PROCEDURE — 3079F DIAST BP 80-89 MM HG: CPT | Performed by: CLINICAL NURSE SPECIALIST

## 2025-03-24 PROCEDURE — 3077F SYST BP >= 140 MM HG: CPT | Performed by: CLINICAL NURSE SPECIALIST

## 2025-03-24 PROCEDURE — 1159F MED LIST DOCD IN RCRD: CPT | Performed by: CLINICAL NURSE SPECIALIST

## 2025-03-24 PROCEDURE — 1160F RVW MEDS BY RX/DR IN RCRD: CPT | Performed by: CLINICAL NURSE SPECIALIST

## 2025-03-24 PROCEDURE — G2211 COMPLEX E/M VISIT ADD ON: HCPCS | Performed by: CLINICAL NURSE SPECIALIST

## 2025-03-24 RX ORDER — CARVEDILOL 12.5 MG/1
12.5 TABLET ORAL
Qty: 180 TABLET | Refills: 1 | Status: SHIPPED | OUTPATIENT
Start: 2025-03-24 | End: 2025-09-20

## 2025-03-24 ASSESSMENT — ENCOUNTER SYMPTOMS
DYSPNEA ON EXERTION: 0
LIGHT-HEADEDNESS: 0
EYES NEGATIVE: 1
HEMATURIA: 0
NEAR-SYNCOPE: 0
DIZZINESS: 0
SHORTNESS OF BREATH: 0
GASTROINTESTINAL NEGATIVE: 1
PALPITATIONS: 0
BRUISES/BLEEDS EASILY: 1
SYNCOPE: 0

## 2025-03-24 NOTE — PATIENT INSTRUCTIONS
Stop Diltiazem when you  your new prescription for carvedilol.   Start Carvedilol 12. 5 mg twice daily with meals. This is to help decrease your blood pressure and heart rates.   Take your blood pressure and HR at home, please write down readings and bring them with you to your next office visit.   Call our office with any new cardiac concerns  Continue current medications  Continue heart-healthy diet. A diet low in sodium, low in cholesterol, limiting red meats and eating whole foods.   Follow up with me, Radhika Conley, in 3 months.

## 2025-03-24 NOTE — PROGRESS NOTES
"Primary Cardiologist: Dr. Good    Chief Complaint:   No chief complaint on file.     History Of Present Illness:    Cedric Mohan is a 80 y.o. male with past medical history of paroxysmal atrial fibrillation, HTN, tobacco use disorder (no dx COPD, no PFTs), BPH with LUTS, bladder cancer s/p TURBT and mitomycin intravesical instillation (01/2023) who presents today for follow up.     Today, Cedric Mohan is feeling well, no cardiac concerns, no recent hospitalizations.   Denies chest pain/pressure, no dizziness or lightheadedness, no shortness of breath, and no palpitations. He denies lower extremity edema, orthopnea or PND.       Last Recorded Vitals:  Visit Vitals  /82 (BP Location: Left arm, Patient Position: Sitting, BP Cuff Size: Adult)   Pulse 102   Ht 1.727 m (5' 8\")   Wt 66.2 kg (146 lb)   BMI 22.20 kg/m²   Smoking Status Former   BSA 1.78 m²        Past Medical History:  He has a past medical history of Other conditions influencing health status, Other conditions influencing health status, and Other conditions influencing health status.    Past Surgical History:  He has a past surgical history that includes Other surgical history (05/23/2013); Hernia repair (07/05/2013); Other surgical history (03/17/2017); Hernia repair (06/11/2013); Other surgical history (06/11/2013); and Other surgical history (06/11/2013).      Social History:  He reports that he has quit smoking. His smoking use included cigarettes. He has been exposed to tobacco smoke. He has never used smokeless tobacco. He reports that he does not drink alcohol and does not use drugs.    Family History:  Family History   Problem Relation Name Age of Onset    Breast cancer Mother      Other (stroke syndrome) Mother      Prostate cancer Brother          Allergies:  Ciprofloxacin, Droxy, Alfuzosin, Azithromycin, and Tramadol    Outpatient Medications:  Current Outpatient Medications   Medication Instructions    albuterol 90 mcg/actuation " inhaler 2 puffs, inhalation, Every 6 hours PRN    aspirin 81 mg, Daily    cyanocobalamin, vitamin B-12, (VITAMIN B-12 ORAL) 1 tablet, 2 times weekly    dilTIAZem CD (CARDIZEM CD) 300 mg, oral, Daily    esomeprazole (NEXIUM) 40 mg, oral, Daily before breakfast, Do not open capsule.    fluticasone (Flonase) 50 mcg/actuation nasal spray 1 spray, Each Nostril, Daily, Shake gently. Before first use, prime pump. After use, clean tip and replace cap.    guaiFENesin (MUCINEX) 1,200 mg, As needed    HYDROcodone-acetaminophen (Norco) 5-325 mg tablet 1 tablet, oral, Every 6 hours PRN    mometasone (Nasonex) 50 mcg/actuation nasal spray 2 sprays, Each Nostril, 2 times daily    multivitamin tablet 1 tablet, Daily    tamsulosin (FLOMAX) 0.4 mg, oral, Daily, Daily before bed         Review of Systems   Constitutional: Negative for malaise/fatigue.   HENT: Negative.     Eyes: Negative.    Cardiovascular:  Negative for chest pain, dyspnea on exertion, leg swelling, near-syncope, palpitations and syncope.   Respiratory:  Negative for shortness of breath.    Hematologic/Lymphatic: Bruises/bleeds easily.   Skin: Negative.    Gastrointestinal: Negative.    Genitourinary:  Negative for hematuria.   Neurological:  Negative for dizziness and light-headedness.        Physical Exam  Vitals reviewed.   Constitutional:       Appearance: Normal appearance.   HENT:      Head: Normocephalic.      Mouth/Throat:      Mouth: Mucous membranes are moist.   Cardiovascular:      Rate and Rhythm: Normal rate and regular rhythm.      Pulses: Normal pulses.      Heart sounds: Normal heart sounds.   Pulmonary:      Effort: Pulmonary effort is normal.      Breath sounds: Normal breath sounds. No wheezing, rhonchi or rales.   Abdominal:      General: Bowel sounds are normal. There is no distension.      Palpations: Abdomen is soft.      Tenderness: There is no abdominal tenderness.   Musculoskeletal:      Cervical back: Normal range of motion.      Right  "lower leg: No edema.      Left lower leg: No edema.   Skin:     General: Skin is warm and dry.   Neurological:      General: No focal deficit present.      Mental Status: He is alert and oriented to person, place, and time.   Psychiatric:         Mood and Affect: Mood normal.         Behavior: Behavior normal.           Last Labs:  CBC -  Lab Results   Component Value Date    WBC 15.7 (H) 06/17/2024    HGB 11.5 (L) 06/17/2024    HCT 36.3 (L) 06/17/2024     (H) 06/17/2024     06/17/2024       CMP -  Lab Results   Component Value Date    CALCIUM 8.7 06/06/2024    PROT 6.8 06/06/2024    ALBUMIN 3.3 (L) 06/06/2024    AST 11 06/06/2024    ALT 11 06/06/2024    ALKPHOS 100 06/06/2024    BILITOT 0.3 06/06/2024       LIPID PANEL -   Lab Results   Component Value Date    CHOL 220 (H) 10/03/2023    TRIG 162 (H) 10/03/2023    HDL 52.0 10/03/2023    CHHDL 4.2 10/03/2023    LDLF 116 (H) 10/10/2022    VLDL 32 10/03/2023    NHDL 168 (H) 10/03/2023       RENAL FUNCTION PANEL -   Lab Results   Component Value Date    GLUCOSE 112 (H) 06/06/2024     (L) 06/06/2024    K 4.4 06/06/2024     06/06/2024    CO2 24 06/06/2024    ANIONGAP 12 06/06/2024    BUN 16 06/06/2024    CREATININE 0.91 06/06/2024    GFRMALE 75 09/13/2023    CALCIUM 8.7 06/06/2024    ALBUMIN 3.3 (L) 06/06/2024        Lab Results   Component Value Date    BNP 25 05/13/2024       Last Cardiology Tests:  ECG:    Echo:  TTE 5/14/24:   Left ventricular systolic function is normal with a 55-60% estimated ejection fraction.   2. Spectral Doppler shows an impaired relaxation pattern of left ventricular diastolic filling.   3. There is low normal right ventricular systolic function.  Ejection Fractions:  No results found for: \"EF\"    Cath:  No results found for this or any previous visit from the past 1095 days.      Stress Test:      Cardiac Imaging:  Holter monitor: Patient was monitored for 13 days and 11 hours starting on 6/19/2024 and ending on " 7/3/2024: Patient had a minimum heart rate of 49 bpm, maximum rate 211 bpm and average heart rate of 76 bpm.  Predominant underlying rhythm was sinus rhythm.  1 run of ventricular tachycardia occurred lasting 5 beats with a max rate of 211 bpm.  Atrial fibrillation occurred (less than 1% burden), ranging from 70 to 163 bpm with an average heart rate of 114 bpm, the longest lasting 1 hour and 10 minutes with an average rate of 123 bpm.  Isolated SVE's were occasional at 3.1% and SVE couplets were rare SVE triplets were rare.  Isolated VE's were rare, VE couplets were rare and no VE triplets were present.      Lab review: I have personally reviewed the laboratory result(s)     Assessment/Plan   Cedric Mohan is a 79 y.o. male with past medical history of HTN, tobacco use disorder (no dx COPD, no PFTs), BPH with LUTS, bladder cancer s/p TURBT and mitomycin intravesical instillation (01/2023) who presents for follow up.    pAF  Recent new diagnosis in setting of sepsis from PNA, hypomagnesemia  Converted to SR spontaneously but had brief recurrent episodes during hospitalization.  Holter monitor as OP with <1% burden.  TTE 5/14/24:  Left ventricular systolic function is normal with a 55-60% estimated ejection fraction. Spectral Doppler shows an impaired relaxation pattern of left ventricular diastolic filling.  Today, denies palpitations, no shortness of breath, weakness.    [id number] score of 3 and patient was started on Apixaban 5 mg BID with initial afib episode. He is no longer taking Apixaban because history of hematuria, patient has verbalized risk of stroke while not on anticoagulation and he continues to decline today.   Switching to Carvedilol today to help with BP control as well as HR control  Patient is going to monitor BP/HR at home.       Hypertension  Today's /74  Elevated today and at several office appts.   Will switch diltiazem to Carvedilol 12.5 mg BID today to try and reduce blood  pressure/heart rate.   Patient agreeable to buy home BP/HR machine to monitor closer at home and to bring readings with him to next office visit.     Tobacco Use  Patient smoking 1 ppd  Discussed smoking cessation today and patient declines referral to smoking cessation clinic at this time.       Radhika Conley, APRN-CNP

## 2025-03-26 ENCOUNTER — APPOINTMENT (OUTPATIENT)
Dept: PRIMARY CARE | Facility: CLINIC | Age: 80
End: 2025-03-26
Payer: MEDICARE

## 2025-03-31 ENCOUNTER — APPOINTMENT (OUTPATIENT)
Dept: PRIMARY CARE | Facility: CLINIC | Age: 80
End: 2025-03-31
Payer: MEDICARE

## 2025-03-31 VITALS
HEART RATE: 72 BPM | HEIGHT: 68 IN | DIASTOLIC BLOOD PRESSURE: 72 MMHG | SYSTOLIC BLOOD PRESSURE: 130 MMHG | BODY MASS INDEX: 23.22 KG/M2 | OXYGEN SATURATION: 94 % | TEMPERATURE: 98.3 F | WEIGHT: 153.2 LBS | RESPIRATION RATE: 18 BRPM

## 2025-03-31 DIAGNOSIS — K11.5 SALIVARY STONES: ICD-10-CM

## 2025-03-31 DIAGNOSIS — I10 HYPERTENSION, UNSPECIFIED TYPE: ICD-10-CM

## 2025-03-31 DIAGNOSIS — Z72.0 TOBACCO ABUSE: ICD-10-CM

## 2025-03-31 DIAGNOSIS — R79.9 ABNORMAL FINDING OF BLOOD CHEMISTRY, UNSPECIFIED: ICD-10-CM

## 2025-03-31 DIAGNOSIS — C67.9 MALIGNANT NEOPLASM OF URINARY BLADDER, UNSPECIFIED SITE (MULTI): ICD-10-CM

## 2025-03-31 DIAGNOSIS — J44.9 CHRONIC OBSTRUCTIVE PULMONARY DISEASE, UNSPECIFIED COPD TYPE (MULTI): ICD-10-CM

## 2025-03-31 DIAGNOSIS — N21.0 BLADDER STONE: ICD-10-CM

## 2025-03-31 DIAGNOSIS — Z00.00 WELLNESS EXAMINATION: ICD-10-CM

## 2025-03-31 DIAGNOSIS — C61 ADENOCARCINOMA OF PROSTATE (MULTI): Primary | Chronic | ICD-10-CM

## 2025-03-31 DIAGNOSIS — M50.90 CERVICAL DISC DISORDER: ICD-10-CM

## 2025-03-31 DIAGNOSIS — I48.91 ATRIAL FIBRILLATION, UNSPECIFIED TYPE (MULTI): ICD-10-CM

## 2025-03-31 DIAGNOSIS — J30.1 SEASONAL ALLERGIC RHINITIS DUE TO POLLEN: ICD-10-CM

## 2025-03-31 PROBLEM — J96.01 ACUTE HYPOXIC RESPIRATORY FAILURE: Status: RESOLVED | Noted: 2024-05-13 | Resolved: 2025-03-31

## 2025-03-31 PROCEDURE — 1123F ACP DISCUSS/DSCN MKR DOCD: CPT | Performed by: INTERNAL MEDICINE

## 2025-03-31 PROCEDURE — G2211 COMPLEX E/M VISIT ADD ON: HCPCS | Performed by: INTERNAL MEDICINE

## 2025-03-31 PROCEDURE — 3075F SYST BP GE 130 - 139MM HG: CPT | Performed by: INTERNAL MEDICINE

## 2025-03-31 PROCEDURE — 1159F MED LIST DOCD IN RCRD: CPT | Performed by: INTERNAL MEDICINE

## 2025-03-31 PROCEDURE — 99213 OFFICE O/P EST LOW 20 MIN: CPT | Performed by: INTERNAL MEDICINE

## 2025-03-31 PROCEDURE — 3078F DIAST BP <80 MM HG: CPT | Performed by: INTERNAL MEDICINE

## 2025-03-31 RX ORDER — ESOMEPRAZOLE MAGNESIUM 40 MG/1
40 CAPSULE, DELAYED RELEASE ORAL
Qty: 90 CAPSULE | Refills: 1 | Status: SHIPPED | OUTPATIENT
Start: 2025-03-31

## 2025-03-31 RX ORDER — MOMETASONE FUROATE MONOHYDRATE 50 UG/1
2 SPRAY, METERED NASAL 2 TIMES DAILY
Qty: 17 G | Refills: 11 | Status: SHIPPED | OUTPATIENT
Start: 2025-03-31 | End: 2026-03-31

## 2025-03-31 NOTE — PROGRESS NOTES
"Subjective   Patient ID: Cedric Mohan is a 80 y.o. male who presents for Follow-up (6 month).    HPI bladder calcium No cancer  Heart is good   no afib  Lungs ok   Tennis elbow     Review of Systems    Objective   /72 (BP Location: Left arm, Patient Position: Sitting, BP Cuff Size: Adult)   Pulse 72   Temp 36.8 °C (98.3 °F) (Temporal)   Resp 18   Ht 1.727 m (5' 8\")   Wt 69.5 kg (153 lb 3.2 oz)   SpO2 94%   BMI 23.29 kg/m²     Physical Exam    Assessment/Plan   Diagnoses and all orders for this visit:  Adenocarcinoma of prostate (Multi)  Comments:  stable  Chronic obstructive pulmonary disease, unspecified COPD type (Multi)  Comments:  active smoker  Atrial fibrillation, unspecified type (Multi)  Comments:  good no anti coag he is aware of risk  Hypertension, unspecified type  Comments:  good  Orders:  -     esomeprazole (NexIUM) 40 mg DR capsule; Take 1 capsule (40 mg) by mouth once daily in the morning. Take before meals. Do not open capsule.  Tobacco abuse  Comments:  ongoing  Orders:  -     esomeprazole (NexIUM) 40 mg DR capsule; Take 1 capsule (40 mg) by mouth once daily in the morning. Take before meals. Do not open capsule.  Bladder stone  Comments:  sees uro  Salivary stones  Comments:  good  Malignant neoplasm of urinary bladder, unspecified site (Multi)  Comments:  FOLLOWS WITH URO  Orders:  -     esomeprazole (NexIUM) 40 mg DR capsule; Take 1 capsule (40 mg) by mouth once daily in the morning. Take before meals. Do not open capsule.  Cervical disc disorder  Comments:  ONGOING RIGHT SIDE  Orders:  -     esomeprazole (NexIUM) 40 mg DR capsule; Take 1 capsule (40 mg) by mouth once daily in the morning. Take before meals. Do not open capsule.  Tobacco abuse  Comments:  ONGOING  Orders:  -     esomeprazole (NexIUM) 40 mg DR capsule; Take 1 capsule (40 mg) by mouth once daily in the morning. Take before meals. Do not open capsule.  Hypertension, unspecified type  -     esomeprazole (NexIUM) 40 " mg DR capsule; Take 1 capsule (40 mg) by mouth once daily in the morning. Take before meals. Do not open capsule.  Seasonal allergic rhinitis due to pollen  -     mometasone (Nasonex) 50 mcg/actuation nasal spray; Administer 2 sprays into each nostril 2 times a day.

## 2025-04-02 RX ORDER — LIDOCAINE HYDROCHLORIDE 20 MG/ML
JELLY TOPICAL ONCE
Status: COMPLETED | OUTPATIENT
Start: 2025-04-02 | End: 2025-04-18

## 2025-04-02 RX ORDER — CEPHALEXIN 500 MG/1
500 CAPSULE ORAL ONCE
Status: COMPLETED | OUTPATIENT
Start: 2025-04-18 | End: 2025-04-18

## 2025-04-08 LAB
ALBUMIN SERPL-MCNC: 4 G/DL (ref 3.6–5.1)
ALP SERPL-CCNC: 95 U/L (ref 35–144)
ALT SERPL-CCNC: 5 U/L (ref 9–46)
ANION GAP SERPL CALCULATED.4IONS-SCNC: 6 MMOL/L (CALC) (ref 7–17)
AST SERPL-CCNC: 12 U/L (ref 10–35)
BILIRUB SERPL-MCNC: 0.7 MG/DL (ref 0.2–1.2)
BUN SERPL-MCNC: 13 MG/DL (ref 7–25)
CALCIUM SERPL-MCNC: 9.2 MG/DL (ref 8.6–10.3)
CHLORIDE SERPL-SCNC: 104 MMOL/L (ref 98–110)
CHOLEST SERPL-MCNC: 235 MG/DL
CHOLEST/HDLC SERPL: 5.6 (CALC)
CO2 SERPL-SCNC: 26 MMOL/L (ref 20–32)
CREAT SERPL-MCNC: 0.86 MG/DL (ref 0.7–1.28)
EGFRCR SERPLBLD CKD-EPI 2021: 88 ML/MIN/1.73M2
ERYTHROCYTE [DISTWIDTH] IN BLOOD BY AUTOMATED COUNT: 12.6 % (ref 11–15)
GLUCOSE SERPL-MCNC: 85 MG/DL (ref 65–99)
HCT VFR BLD AUTO: 48.5 % (ref 38.5–50)
HDLC SERPL-MCNC: 42 MG/DL
HGB BLD-MCNC: 16.2 G/DL (ref 13.2–17.1)
LDLC SERPL CALC-MCNC: 162 MG/DL (CALC)
MCH RBC QN AUTO: 32.7 PG (ref 27–33)
MCHC RBC AUTO-ENTMCNC: 33.4 G/DL (ref 32–36)
MCV RBC AUTO: 98 FL (ref 80–100)
NONHDLC SERPL-MCNC: 193 MG/DL (CALC)
PLATELET # BLD AUTO: 327 THOUSAND/UL (ref 140–400)
PMV BLD REES-ECKER: 9.2 FL (ref 7.5–12.5)
POTASSIUM SERPL-SCNC: 4.5 MMOL/L (ref 3.5–5.3)
PROT SERPL-MCNC: 6.9 G/DL (ref 6.1–8.1)
RBC # BLD AUTO: 4.95 MILLION/UL (ref 4.2–5.8)
SODIUM SERPL-SCNC: 136 MMOL/L (ref 135–146)
TRIGL SERPL-MCNC: 161 MG/DL
TSH SERPL-ACNC: 1.68 MIU/L (ref 0.4–4.5)
WBC # BLD AUTO: 14.6 THOUSAND/UL (ref 3.8–10.8)

## 2025-04-11 ENCOUNTER — APPOINTMENT (OUTPATIENT)
Dept: UROLOGY | Facility: CLINIC | Age: 80
End: 2025-04-11
Payer: MEDICARE

## 2025-04-18 ENCOUNTER — APPOINTMENT (OUTPATIENT)
Dept: UROLOGY | Facility: CLINIC | Age: 80
End: 2025-04-18
Payer: MEDICARE

## 2025-04-18 VITALS
DIASTOLIC BLOOD PRESSURE: 91 MMHG | HEIGHT: 68 IN | SYSTOLIC BLOOD PRESSURE: 172 MMHG | WEIGHT: 150 LBS | HEART RATE: 79 BPM | BODY MASS INDEX: 22.73 KG/M2

## 2025-04-18 DIAGNOSIS — R35.0 BENIGN PROSTATIC HYPERPLASIA WITH URINARY FREQUENCY: ICD-10-CM

## 2025-04-18 DIAGNOSIS — D49.4 BLADDER TUMOR: ICD-10-CM

## 2025-04-18 DIAGNOSIS — C67.9 MALIGNANT NEOPLASM OF URINARY BLADDER, UNSPECIFIED SITE (MULTI): Primary | ICD-10-CM

## 2025-04-18 DIAGNOSIS — N40.1 BENIGN PROSTATIC HYPERPLASIA WITH URINARY FREQUENCY: ICD-10-CM

## 2025-04-18 LAB
POC BILIRUBIN, URINE: NEGATIVE
POC BLOOD, URINE: NEGATIVE
POC GLUCOSE, URINE: NEGATIVE MG/DL
POC KETONES, URINE: NEGATIVE MG/DL
POC LEUKOCYTES, URINE: NEGATIVE
POC NITRITE,URINE: NEGATIVE
POC PH, URINE: 7 PH
POC PROTEIN, URINE: NEGATIVE MG/DL
POC SPECIFIC GRAVITY, URINE: 1.01
POC UROBILINOGEN, URINE: 0.2 EU/DL

## 2025-04-18 PROCEDURE — 52000 CYSTOURETHROSCOPY: CPT | Performed by: UROLOGY

## 2025-04-18 PROCEDURE — 81003 URINALYSIS AUTO W/O SCOPE: CPT | Performed by: UROLOGY

## 2025-04-18 RX ADMIN — CEPHALEXIN 500 MG: 500 CAPSULE ORAL at 13:41

## 2025-04-18 RX ADMIN — LIDOCAINE HYDROCHLORIDE: 20 JELLY TOPICAL at 13:42

## 2025-04-18 NOTE — PROGRESS NOTES
04/18/2025  Cystoscopy     Gross hematuria x 1 over 3-month     A cystoscopy was performed under local without difficulties     Findings: Mild bulbar urethral stricture, moderate prostate urethral stricture, moderate enlarged prostate; some erythematous bladder mucosa posterior wall, small calcification posterior wall x 2, no obvious tumor in the bladder     Pain evaluation: 0/10 before, 2/10 after.     We discussed cystoscopy finding: Urethral stricture, erythematous bladder mucosa, no obvious tumor  We discussed continued surveillance cystoscopy in 3 months  All the questions were answered, the patient expressed understanding and agreed to the plan.     Impression  Urethral stricture  Bladder stone  Bladder tumor recurrence  Urinary retention  BPH  Urinary tract infection  Bladder cancer status post TURBT     Plan  Cystoscopy in 6 months    Chief Complaint   Patient presents with    Bladder Cancer     Patient is here today for cystoscopy for bladder cancer surveillance.  He denies any voiding issues at this time.  He is requesting refill on his flomax        Physical Exam     TODAYS LAB RESULTS:  Urine  NEGATIVE mg/dl NEGATIVE NEGATIVE NEGATIVE VC, R NEGATIVE NEGATIVE NEGATIVE   POC Bilirubin, Urine  NEGATIVE NEGATIVE NEGATIVE NEGATIVE VC, R NEGATIVE NEGATIVE NEGATIVE   POC Ketones, Urine  NEGATIVE mg/dl NEGATIVE NEGATIVE NEGATIVE VC, R NEGATIVE 15 (1+) Abnormal  TRACE Abnormal    POC Specific Gravity, Urine  1.005 - 1.035 1.015 <=1.005 1.010 VC, R 1.010 1.020 1.020   POC Blood, Urine  NEGATIVE NEGATIVE LARGE (3+) Abnormal  LARGE (3+) Abnormal  VC, R NEGATIVE NEGATIVE TRACE-Lysed Abnormal    POC PH, Urine  No Reference Range Established PH 7.0 6.0 6.0 VC, R 6.5 6.5 6.0   POC Protein, Urine  NEGATIVE mg/dl NEGATIVE NEGATIVE TRACE Abnormal  R VC, R NEGATIVE R NEGATIVE R 30 (1+) R   POC Urobilinogen, Urine  0.2, 1.0 EU/DL 0.2 0.2 0.2 VC, R 0.2 0.2 0.2   Poc Nitrite, Urine  NEGATIVE NEGATIVE NEGATIVE NEGATIVE VC, R  NEGATIVE NEGATIVE NEGATIVE   POC Leukocytes, Urine  NEGATIVE NEGATIVE NEGATIVE SMALL (1+) Abnormal  VC, R NEGATIVE NEGATIVE MODERATE (2+) Abnormal        Lab Results   Component Value Date    PSA 0.25 09/13/2023    PSA 0.22 09/21/2022    PSA 0.25 04/14/2022        ASSESSMENT&PLAN:      IMPRESSIONS:          01/10/2025  Cystoscopy     Gross hematuria intermittently     A cystoscopy was performed under local without difficulties     Findings: Mild bulbar urethral stricture, moderate prostate urethral stricture, moderate enlarged prostate; some erythematous bladder mucosa posterior wall, small calcification posterior wall x 2, no obvious tumor in the bladder     Pain evaluation: 0/10 before, 2/10 after.     We discussed cystoscopy finding: Urethral stricture, erythematous bladder mucosa, no obvious tumor  We discussed continued surveillance cystoscopy in 3 months  All the questions were answered, the patient expressed understanding and agreed to the plan.     Impression  Urethral stricture  Bladder stone  Bladder tumor recurrence  Urinary retention  BPH  Urinary tract infection  Bladder cancer status post TURBT     Plan  Cystoscopy in 3 months             Chief Complaint   Patient presents with    Bladder Cancer       Patient is here today for cysto for bladder cancer history.  Pt states he had a uti and he had blood in his urine          Physical Exam      TODAYS LAB RESULTS:  ontains abnormal data POCT UA Automated manually resulted  Order: [id number]   Status: Final result       Visible to patient: Yes (not seen)       Next appt: 03/24/2025 at 11:30 AM in Cardiology (Radhika Conley, APRN-CNP)       Dx: Malignant neoplasm of urinary bladder...    0 Result Notes                  Component  Ref Range & Units 13:46  (1/10/25) 3 mo ago  (9/13/24) 5 mo ago  (8/2/24) 9 mo ago  (4/12/24) 9 mo ago  (3/20/24) 1 yr ago  (10/12/23)   POC Color, Urine  Straw, Yellow, Light-Yellow Yellow Yellow VC, R   Yellow Yellow   POC  Appearance, Urine  Clear Clear Clear VC, R   Clear Clear   POC Glucose, Urine  NEGATIVE mg/dl NEGATIVE NEGATIVE VC, R NEGATIVE NEGATIVE NEGATIVE   POC Bilirubin, Urine  NEGATIVE NEGATIVE NEGATIVE VC, R NEGATIVE NEGATIVE NEGATIVE   POC Ketones, Urine  NEGATIVE mg/dl NEGATIVE NEGATIVE VC, R NEGATIVE 15 (1+) Abnormal  TRACE Abnormal    POC Specific Gravity, Urine  1.005 - 1.035 <=1.005 1.010 VC, R 1.010 1.020 1.020   POC Blood, Urine  NEGATIVE LARGE (3+) Abnormal  LARGE (3+) Abnormal  VC, R NEGATIVE NEGATIVE TRACE-Lysed Abnormal    POC PH, Urine  No Reference Range Established PH 6.0 6.0 VC, R 6.5 6.5 6.0   POC Protein, Urine  NEGATIVE mg/dl NEGATIVE TRACE Abnormal  R VC, R NEGATIVE R NEGATIVE R 30 (1+) R   POC Urobilinogen, Urine  0.2, 1.0 EU/DL 0.2 0.2 VC, R 0.2 0.2 0.2   Poc Nitrite, Urine  NEGATIVE NEGATIVE NEGATIVE VC, R NEGATIVE NEGATIVE NEGATIVE   POC Leukocytes, Urine  NEGATIVE NEGATIVE SMALL (1+) Abnormal  VC, R NEGATIVE NEGATIVE MODERATE (2+) Abnormal             ASSESSMENT&PLAN:        IMPRESSIONS:           09/13/2024   cystoscopy     Voiding well and no blood in urine     A cystoscopy was performed under local without difficulty     Findings: Normal anterior urethra, mild enlarged prostate, 1 x 2 cm bladder stone attached to the mucosa, mucosal irregularity posterior right lateral wall     Pain evaluation: 0/10 before, 2/10 after.     Antibiotics Keflex 500 mg given     We discussed benign prostate hypertrophy urinary retention urinary tract infection  We discussed the voiding trial today  We discussed the surveillance cystoscopy  All the questions were answered, the patient expressed understanding and agreed to the plan.     Impression  Bladder stone  Bladder tumor recurrence  Urinary retention  BPH  Urinary tract infection  Bladder cancer status post TURBT     Plan  OR cystoscopy bladder stone lithotripsy, possible TURBT, bladder biopsy           Chief Complaint   Patient presents with    Bladder  Prolapse         Physical Exam      TODAYS LAB RESULTS:  ontains abnormal data POCT UA Automated manually resulted  Order: [id number]   Status: Final result       Visible to patient: Yes (not seen)       Next appt: 09/24/2024 at 01:00 PM in Cardiology (Lulu Good MD)       Dx: Urinary frequency    0 Result Notes                Component  Ref Range & Units 14:27  (9/13/24) 1 mo ago  (8/2/24) 5 mo ago  (4/12/24) 5 mo ago  (3/20/24) 11 mo ago  (10/12/23)   POC Color, Urine  Straw, Yellow, Light-Yellow Yellow VC, R   Yellow Yellow   POC Appearance, Urine  Clear Clear VC, R   Clear Clear   POC Glucose, Urine  NEGATIVE mg/dl NEGATIVE VC, R NEGATIVE NEGATIVE NEGATIVE   POC Bilirubin, Urine  NEGATIVE NEGATIVE VC, R NEGATIVE NEGATIVE NEGATIVE   POC Ketones, Urine  NEGATIVE mg/dl NEGATIVE VC, R NEGATIVE 15 (1+) Abnormal  TRACE Abnormal    POC Specific Gravity, Urine  1.005 - 1.035 1.010 VC, R 1.010 1.020 1.020   POC Blood, Urine  NEGATIVE LARGE (3+) Abnormal  VC, R NEGATIVE NEGATIVE TRACE-Lysed Abnormal    POC PH, Urine  No Reference Range Established PH 6.0 VC, R 6.5 6.5 6.0   POC Protein, Urine  NEGATIVE, 30 (1+) mg/dl TRACE Abnormal  VC, R NEGATIVE NEGATIVE 30 (1+)   POC Urobilinogen, Urine  0.2, 1.0 EU/DL 0.2 VC, R 0.2 0.2 0.2   Poc Nitrite, Urine  NEGATIVE NEGATIVE VC, R NEGATIVE NEGATIVE NEGATIVE   POC Leukocytes, Urine  NEGATIVE SMALL (1+) Abnormal  VC, R NEGATIVE NEGATIVE MODERATE (2+) Abnormal                 Specimen Collected: 09/13/24 14:27 Last Resulted: 09/13/24 14:27        Lab Flowsheet        Order Details        View Encounter        Lab and Collection Details        Routing        Result History     View All Conversations on this Encounter        VC=Value has a corrected status   R=Reference range differs from displayed range        Result Care Coordination       Patient Communication      Add Comments   Add Notifications  Back to Top           POCT UA Automated manually resulted: Patient  Communication      Add Comments   Add Notifications       Lab Component SmartPhrase Guide     POCT UA Automated manually resulted (Order #[id number]) on 9/13/24      Specimen Date Taken Specimen Time Taken Specimen Received Date Specimen Received Time Result Date Result Time   Sep 13, 2024  2:27 PM     Sep 13, 2024  2:27 PM         ASSESSMENT&PLAN:        IMPRESSIONS:    08/12/2024  Patient developed UTI symptoms and urinary retention, Isaac was inserted and on antibiotics     Exam: Isaac catheter in place for 10 days, urine clear yellow     Catheter was removed without difficulty     We discussed benign prostate hypertrophy urinary retention urinary tract infection  We discussed the voiding trial today  We discussed the surveillance cystoscopy  All the questions were answered, the patient expressed understanding and agreed to the plan.     Impression  Urinary retention  BPH  Urinary tract infection  Bladder cancer status post TURBT     Plan  Voiding trial today, call back 3 PM  Continue current antibiotics  Surveillance cystoscopy             Chief Complaint   Patient presents with    Benign Prostatic Hypertrophy       Patient is here today for 1 week follow up.  Pt states that he only took the ATB for 2.5 days the urine in the bag looked black, and after he stopped the antibiotic it cleared up.  He will also need a script for tamsulosin.         Physical Exam      TODAYS LAB RESULTS:            Lab Results   Component Value Date     PSA 0.25 09/13/2023     PSA 0.22 09/21/2022     PSA 0.25 04/14/2022         CT SCAN 5-  Narrative & Impression   STUDY:  CT Abdomen and Pelvis with IV Contrast; 5/13/2024 1:20 PM  INDICATION:  Leukocytosis.  COMPARISON:  CT urogram 12/5/2023, 12/14/2022.  ACCESSION NUMBER(S):  [id number]  ORDERING CLINICIAN:  YINKA GONZALEZ  TECHNIQUE:  CT of the abdomen and pelvis was performed.  Contiguous axial images  were obtained at 3 mm slice thickness through the abdomen and pelvis.    Coronal and sagittal reconstructions at 3 mm slice thickness were  performed.  Omnipaque 350--75 mL was administered intravenously.    FINDINGS:  LOWER CHEST:  No cardiomegaly.  No pericardial effusion.  Chronic emphysematous  changes are seen in both lungs.  There are infiltrates in the right  middle lobe.  Infiltrate and/or dependent changes noted in the  posterior right lower lobe.  These findings were not seen on the prior  study of 12/5/2023.     ABDOMEN:     LIVER:  No hepatomegaly.  Smooth surface contour.  Normal attenuation.     BILE DUCTS:  No intrahepatic or extrahepatic biliary ductal dilatation.     GALLBLADDER:  The gallbladder is normal.  STOMACH:  Study is diffusely thickened however it is not well distended and this  is most likely normal..     PANCREAS:  No masses or ductal dilatation.     SPLEEN:  No splenomegaly or focal splenic lesion.     ADRENAL GLANDS:  There is thickening of the left adrenal gland, most likely  representing hypertrophy.     KIDNEYS AND URETERS:  There are multiple renal cysts as seen previously  No renal or  ureteral calculi.     PELVIS:     BLADDER:  Chronic calcifications noted in the dependent portion of the bladder.   Chronic thickening of the bladder base which may be the result of  encroachment by the prostate.  Prostate seed implants are also noted  in place.     REPRODUCTIVE ORGANS:  No abnormalities identified.     BOWEL:  There is diverticulosis of the sigmoid without evidence of  inflammation.  There is a normal appendix.     VESSELS:  No abnormalities identified.  Abdominal aorta is normal in caliber.      PERITONEUM/RETROPERITONEUM/LYMPH NODES:  No free fluid.  No pneumoperitoneum.  No lymphadenopathy.     ABDOMINAL WALL:  No abnormalities identified.  SOFT TISSUES:   No abnormalities identified.     BONES:  No acute fracture or aggressive osseous lesion.  Multilevel moderately  advanced degenerative disc disease noted.  IMPRESSION:  No focal acute pathology  demonstrated in the abdomen and pelvis.  Chronic lateral calcifications, prostatic seed implants and thickening  at the base of the bladder all stable.  Acute appearing infiltrate in the right middle lobe and probable  dependent changes at the right lung base.  These findings are  superimposed on chronic emphysematous disease..  Signed by Rory Orr         ASSESSMENT&PLAN:        IMPRESSIONS:       08/02/2024 Biggrachel Russell PCNA  Complaining gross hematuria with clots over the old month.  Off Eliquis for 1 month     A 18 [place] coudé catheter was inserted, light pink urine now, no clots     We discussed history of bladder cancer, gross hematuria  We discussed short-term Isaac catheter, off Eliquis  We discussed post form cystoscopy  All the questions were answered, the patient expressed understanding and agreed to the plan.     Impression  Hematuria with clots  Bladder cancer  BPH     Plan  Isaac catheter to bag  Increase fluid intake  Cystoscopy next week  Continue Flomax 0.4 mg daily     04/12/2024  History of bladder cancer, 1 month post surveillance cystoscopy.  Voiding okay on Flomax 0.4 mg     Patient has no nausea, no vomiting, no fever.     YUKO: Deferred     PSA: Normal to updated     We discussed bladder cancer, surveillance cystoscopy every 3 to 4 months  We discussed benign prostate hypertrophy continue Flomax 0.4 mg daily, may consider 0.8 mg as well  We discussed PSA screening  All the questions were answered, the patient expressed understanding and agreed to the plan.     Impression  Bladder cancer  BPH     Plan  Cystoscopy in 4-month  Continue Flomax 0.4 mg daily  May consider 0.8 mg daily                 04/12/2024  History of bladder cancer, 1 month post surveillance cystoscopy.  Voiding okay on Flomax 0.4 mg     Patient has no nausea, no vomiting, no fever.     YUKO: Deferred     PSA: Normal to updated     We discussed bladder cancer, surveillance cystoscopy every 3 to 4 months  We discussed benign  prostate hypertrophy continue Flomax 0.4 mg daily, may consider 0.8 mg as well  We discussed PSA screening  All the questions were answered, the patient expressed understanding and agreed to the plan.     Impression  Bladder cancer  BPH     Plan  Cystoscopy in 4-month  Continue Flomax 0.4 mg daily  May consider 0.8 mg daily        I spent 25 minutes with this patient. Greater than 50% of this time was spent in counseling and/or coordination of care             Chief Complaint   Patient presents with    Bladder Lesion       Patient here to establish with a urologist in [place]. He previously saw Dr. Marlow at [facility], but now wants to be seen here. Cystoscopy was performed 03/20/2024 per Dr. Marlow. Patient is here to discuss TURBT vs surveillance cystoscopy in 3 months. Patient has a hx of prostate cancer and bladder cancer.          Physical Exam      TODAYS LAB RESULTS:     PSA 09/13/2023  0.25     POC Glucose, Urine  NEGATIVE mg/dl NEGATIVE   POC Bilirubin, Urine  NEGATIVE NEGATIVE   POC Ketones, Urine  NEGATIVE mg/dl NEGATIVE   POC Specific Gravity, Urine  1.005 - 1.035 1.010   POC Blood, Urine  NEGATIVE NEGATIVE   POC PH, Urine  No Reference Range Established PH 6.5   POC Protein, Urine  NEGATIVE, 30 (1+) mg/dl NEGATIVE   POC Urobilinogen, Urine  0.2, 1.0 EU/DL 0.2   Poc Nitrite, Urine  NEGATIVE NEGATIVE   POC Leukocytes, Urine  NEGATIVE NEGATIVE      ASSESSMENT&PLAN:        IMPRESSIONS:        HPI  79 y.o. male who presents for cystoscopic evaluation for bladder lesion Pt has a CT urography, conducted on 12/5/2023, revealed :      Interval resolution of previously visualized areas of soft tissue filling defects of the right-greater-than-left posterior urinary bladder wall without evidence of new abnormal filling defects along the urinary bladder wall on delayed phase imaging. New coarse calcifications within the posterior aspect of the urinary bladder lumen are favored to be secondary to patient's history of  intravesical mitomycin treatment (likely treated disease).  Additionally, asymmetric urinary bladder wall thickening along the  right-greater-than-left posterior urinary bladder wall is also  favored to be secondary to posttreatment changes, although underlying  neoplasm can not be excluded. Attention on follow-up is recommended.        Review of Systems     All systems were reviewed. Anything negative was noted in the HPI.     Objective   Physical Exam     General: Well developed, well nourished, alert and cooperative, appears in no acute distress   Eyes: Non-injected conjunctiva, sclera clear, no proptosis   Cardiac: Extremities are warm and well perfused. No edema, cyanosis or pallor   Lungs: Breathing is easy, non-labored. Speaking in clear and complete sentences. Normal diaphragmatic movement   MSK: Ambulatory with steady gait, unassisted   Neuro: Alert and oriented to person, place, and time   Psych: Demonstrates good judgment and reason, without hallucinations, abnormal affect or abnormal behaviors   Skin: No obvious lesions, no rashes       No CVA tenderness bilaterally   No suprapubic pain or discomfort         Medical History           Past Medical History:   Diagnosis Date    Other conditions influencing health status       Arthritis    Other conditions influencing health status       Bladder Cancer    Other conditions influencing health status       Prostate Cancer               Surgical History             Past Surgical History:   Procedure Laterality Date    HERNIA REPAIR   07/05/2013     Hernia Repair    HERNIA REPAIR   06/11/2013     Hernia Repair    OTHER SURGICAL HISTORY   05/23/2013     General Surgery    OTHER SURGICAL HISTORY   03/17/2017     Sialodochoplasty    OTHER SURGICAL HISTORY   06/11/2013     Tonsillectomy Over Age 13    OTHER SURGICAL HISTORY   06/11/2013     Cystoscopy With Resection Of Tumor         3/20/24 Dr. Marlow  Procedure:     The patient was prepped using a Betadine solution.  Lidocaine jelly was instilled into the urethra. The flexible cystoscope was sterilely inserted into the urethra and formal cystoscopy performed in a systematic fashion. For detailed findings of the procedure, please see Dr. Marlow’s remarks below  Scope A used, Cipro 500 mg p.o. given           Assessment/Plan   Cystoscopic evaluation for Bladder lesion on CT     79 y.o. male who presents for the above condition, We had a very long and extensive discussion with the patient regarding the pathophysiology, differential diagnosis, risk factor, management, natural history, incidence and diagnostic work-up of the condition.      Surgical intervention will be scheduled to remove scar tissue and potentially send tissue for pathological examination to rule out malignancy     Plan:  - TURBT Vs observation and fu in 4 months with another cysto  - Pt wants to follow up his care at [facility]      PSA  9/13/2023 0.25  9/21/2022 0.22  ......     Surgery  10/8/2024 TURBT, litholapaxy  7/19/2023 cystoscopy TURBT, mitomycin instillation dr Marlow  1/11/2023 TURBT and mitomycin intravesical instillation dr Marlow

## 2025-04-18 NOTE — PATIENT INSTRUCTIONS
Impression  Urethral stricture  Bladder stone  Bladder tumor recurrence  Urinary retention  BPH  Urinary tract infection  Bladder cancer status post TURBT     Plan  Cystoscopy in 6 months

## 2025-06-17 ASSESSMENT — ENCOUNTER SYMPTOMS
DYSPNEA ON EXERTION: 0
GASTROINTESTINAL NEGATIVE: 1
BRUISES/BLEEDS EASILY: 1
SYNCOPE: 0
DIZZINESS: 0
HEMATURIA: 0
EYES NEGATIVE: 1
LIGHT-HEADEDNESS: 0
SHORTNESS OF BREATH: 0
NEAR-SYNCOPE: 0
PALPITATIONS: 0

## 2025-06-17 NOTE — PROGRESS NOTES
"Primary Cardiologist: Dr. Good    Chief Complaint:   Follow-up     History Of Present Illness:    Cedric Mohan is a 80 y.o. male with past medical history of paroxysmal atrial fibrillation not on anticoagulation d/t patient decline, HTN, tobacco use disorder (no dx COPD, no PFTs), BPH with LUTS, bladder cancer s/p TURBT and mitomycin intravesical instillation (01/2023) who presents today for 3 month follow up.   At last office visit, patient was taken off of Diltiazem and started on Carvedilol to improve blood pressure.     Today, Cedric Mohan is feeling well, he has no cardiac concerns today. He denies chest pain or pressure, no shortness of breath, he denies dizziness or lightheadedness. Patient has been monitoring his blood pressure at home 4-5 times per month and brought in his phone which records the readings. Noted to have avg HR in the 70s,  mmHg.       Last Recorded Vitals:  Visit Vitals  /82 (BP Location: Left arm, Patient Position: Sitting, BP Cuff Size: Adult)   Pulse 86   Ht 1.727 m (5' 8\")   Wt 66.2 kg (146 lb)   SpO2 96%   BMI 22.20 kg/m²   Smoking Status Every Day   BSA 1.78 m²        Past Medical History:  He has a past medical history of Other conditions influencing health status, Other conditions influencing health status, and Other conditions influencing health status.    Past Surgical History:  He has a past surgical history that includes Other surgical history (05/23/2013); Hernia repair (07/05/2013); Other surgical history (03/17/2017); Hernia repair (06/11/2013); Other surgical history (06/11/2013); and Other surgical history (06/11/2013).      Social History:  He reports that he has been smoking cigarettes. He has been exposed to tobacco smoke. He has never used smokeless tobacco. He reports that he does not drink alcohol and does not use drugs.    Family History:  Family History   Problem Relation Name Age of Onset    Breast cancer Mother      Other (stroke syndrome) " Mother      Prostate cancer Brother          Allergies:  Ciprofloxacin, Droxy, Alfuzosin, Azithromycin, and Tramadol    Outpatient Medications:  Current Outpatient Medications   Medication Instructions    albuterol 90 mcg/actuation inhaler 2 puffs, inhalation, Every 6 hours PRN    aspirin 81 mg, Daily    carvedilol (COREG) 12.5 mg, oral, 2 times daily (morning and late afternoon)    cyanocobalamin, vitamin B-12, (VITAMIN B-12 ORAL) 1 tablet, 2 times weekly    esomeprazole (NEXIUM) 40 mg, oral, Daily before breakfast, Do not open capsule.    fluticasone (Flonase) 50 mcg/actuation nasal spray 1 spray, Each Nostril, Daily, Shake gently. Before first use, prime pump. After use, clean tip and replace cap.    guaiFENesin (MUCINEX) 1,200 mg, As needed    HYDROcodone-acetaminophen (Norco) 5-325 mg tablet 1 tablet, oral, Every 6 hours PRN    mometasone (Nasonex) 50 mcg/actuation nasal spray 2 sprays, Each Nostril, 2 times daily    multivitamin tablet 1 tablet, Daily    tamsulosin (Flomax) 0.4 mg 24 hr capsule TAKE 1 CAPSULE (0.4 MG) BY MOUTH ONCE DAILY BEFORE BED.         Review of Systems   Constitutional: Negative for malaise/fatigue.   HENT: Negative.     Eyes: Negative.    Cardiovascular:  Negative for chest pain, dyspnea on exertion, leg swelling, near-syncope, palpitations and syncope.   Respiratory:  Negative for shortness of breath.    Hematologic/Lymphatic: Bruises/bleeds easily.   Skin: Negative.    Gastrointestinal: Negative.    Genitourinary:  Negative for hematuria.   Neurological:  Negative for dizziness and light-headedness.        Physical Exam  Vitals reviewed.   Constitutional:       Appearance: Normal appearance.   HENT:      Head: Normocephalic.      Mouth/Throat:      Mouth: Mucous membranes are moist.   Cardiovascular:      Rate and Rhythm: Normal rate and regular rhythm.      Pulses: Normal pulses.      Heart sounds: Normal heart sounds.   Pulmonary:      Effort: Pulmonary effort is normal.      Breath  sounds: Normal breath sounds. No wheezing, rhonchi or rales.   Abdominal:      General: Bowel sounds are normal. There is no distension.      Palpations: Abdomen is soft.      Tenderness: There is no abdominal tenderness.   Musculoskeletal:      Cervical back: Normal range of motion.      Right lower leg: No edema.      Left lower leg: No edema.   Skin:     General: Skin is warm and dry.   Neurological:      General: No focal deficit present.      Mental Status: He is alert and oriented to person, place, and time.   Psychiatric:         Mood and Affect: Mood normal.         Behavior: Behavior normal.         Last Labs:  CBC -  Lab Results   Component Value Date    WBC 14.6 (H) 04/08/2025    HGB 16.2 04/08/2025    HCT 48.5 04/08/2025    MCV 98.0 04/08/2025     04/08/2025       CMP -  Lab Results   Component Value Date    CALCIUM 9.2 04/08/2025    PROT 6.9 04/08/2025    ALBUMIN 4.0 04/08/2025    AST 12 04/08/2025    ALT 5 (L) 04/08/2025    ALKPHOS 95 04/08/2025    BILITOT 0.7 04/08/2025       LIPID PANEL -   Lab Results   Component Value Date    CHOL 235 (H) 04/08/2025    TRIG 161 (H) 04/08/2025    HDL 42 04/08/2025    CHHDL 5.6 (H) 04/08/2025    LDLF 116 (H) 10/10/2022    VLDL 32 10/03/2023    NHDL 193 (H) 04/08/2025       RENAL FUNCTION PANEL -   Lab Results   Component Value Date    GLUCOSE 85 04/08/2025     04/08/2025    K 4.5 04/08/2025     04/08/2025    CO2 26 04/08/2025    ANIONGAP 6 (L) 04/08/2025    BUN 13 04/08/2025    CREATININE 0.86 04/08/2025    GFRMALE 75 09/13/2023    CALCIUM 9.2 04/08/2025    ALBUMIN 4.0 04/08/2025        Lab Results   Component Value Date    BNP 25 05/13/2024       Last Cardiology Tests:  ECG:    Echo:  TTE 5/14/24:   Left ventricular systolic function is normal with a 55-60% estimated ejection fraction.   2. Spectral Doppler shows an impaired relaxation pattern of left ventricular diastolic filling.   3. There is low normal right ventricular systolic  "function.  Ejection Fractions:  No results found for: \"EF\"    Cath:  No results found for this or any previous visit from the past 1095 days.      Stress Test:      Cardiac Imaging:  Holter monitor: Patient was monitored for 13 days and 11 hours starting on 6/19/2024 and ending on 7/3/2024: Patient had a minimum heart rate of 49 bpm, maximum rate 211 bpm and average heart rate of 76 bpm.  Predominant underlying rhythm was sinus rhythm.  1 run of ventricular tachycardia occurred lasting 5 beats with a max rate of 211 bpm.  Atrial fibrillation occurred (less than 1% burden), ranging from 70 to 163 bpm with an average heart rate of 114 bpm, the longest lasting 1 hour and 10 minutes with an average rate of 123 bpm.  Isolated SVE's were occasional at 3.1% and SVE couplets were rare SVE triplets were rare.  Isolated VE's were rare, VE couplets were rare and no VE triplets were present.      Lab review: I have personally reviewed the laboratory result(s)     Assessment/Plan   Cedric Mohan is a 80 y.o. male with past medical history of paroxysmal atrial fibrillation not on anticoagulation d/t patient decline, HTN, tobacco use disorder (no dx COPD, no PFTs), BPH with LUTS, bladder cancer s/p TURBT and mitomycin intravesical instillation (01/2023) who presents today for 3 month follow up.   At last office visit, patient was taken off of Diltiazem and started on Carvedilol to improve blood pressure.       pAF  In setting of sepsis from PNA, hypomagnesemia  Converted to SR spontaneously but had brief recurrent episodes during hospitalization.  Holter monitor as OP with <1% burden.  TTE 5/14/24: LVEF  55-60%, impaired relaxation pattern of left ventricular diastolic filling.  Today, denies any palpitations  [id number] score of 3 and patient was started on Apixaban 5 mg BID with initial afib episode. He stopped taking Apixaban because history of hematuria and will not resume.   Patient remains unwilling to resume " anticoagulation, he understands his increased risk of stroke.   Recently switched patient from Diltiazem to Carvedilol for better blood pressure control.   Patient remains hypertensive and no bradycardia noted on home monitor, will increase carvedilol today.     Hypertension  Today's /82  Home readings: Noted to have avg HR in the 70s,  mmHg.   Will increase patient's carvedilol to 25 mg twice daily today  Patient will continue to monitor his HR/BP and call office if experiences any dizziness/lightheadedness or bradycardia.     Tobacco Use  Patient smoking 1 ppd  Patient declines offer to tobacco cessation clinic, he does not have a desire to quit.     Radhika Conley, APRN-CNP

## 2025-06-18 ENCOUNTER — OFFICE VISIT (OUTPATIENT)
Dept: CARDIOLOGY | Facility: HOSPITAL | Age: 80
End: 2025-06-18
Payer: MEDICARE

## 2025-06-18 VITALS
WEIGHT: 146 LBS | SYSTOLIC BLOOD PRESSURE: 142 MMHG | OXYGEN SATURATION: 96 % | BODY MASS INDEX: 22.13 KG/M2 | HEIGHT: 68 IN | HEART RATE: 86 BPM | DIASTOLIC BLOOD PRESSURE: 82 MMHG

## 2025-06-18 DIAGNOSIS — I10 PRIMARY HYPERTENSION: ICD-10-CM

## 2025-06-18 DIAGNOSIS — I48.91 ATRIAL FIBRILLATION WITH RAPID VENTRICULAR RESPONSE (MULTI): ICD-10-CM

## 2025-06-18 DIAGNOSIS — I48.0 PAROXYSMAL ATRIAL FIBRILLATION (MULTI): Primary | ICD-10-CM

## 2025-06-18 PROCEDURE — 1160F RVW MEDS BY RX/DR IN RCRD: CPT | Performed by: CLINICAL NURSE SPECIALIST

## 2025-06-18 PROCEDURE — 1159F MED LIST DOCD IN RCRD: CPT | Performed by: CLINICAL NURSE SPECIALIST

## 2025-06-18 PROCEDURE — 99213 OFFICE O/P EST LOW 20 MIN: CPT | Performed by: CLINICAL NURSE SPECIALIST

## 2025-06-18 PROCEDURE — 3077F SYST BP >= 140 MM HG: CPT | Performed by: CLINICAL NURSE SPECIALIST

## 2025-06-18 PROCEDURE — 99212 OFFICE O/P EST SF 10 MIN: CPT | Performed by: CLINICAL NURSE SPECIALIST

## 2025-06-18 PROCEDURE — 3079F DIAST BP 80-89 MM HG: CPT | Performed by: CLINICAL NURSE SPECIALIST

## 2025-06-18 RX ORDER — CARVEDILOL 25 MG/1
25 TABLET ORAL
Qty: 180 TABLET | Refills: 3 | Status: SHIPPED | OUTPATIENT
Start: 2025-06-18 | End: 2026-06-18

## 2025-06-18 NOTE — PATIENT INSTRUCTIONS
Increase your carvedilol to 25 mg twice a day. You can take 2 of your 12.5 mg tablets twice a day until you  your new prescription.   Please call our office if you start to experience any dizziness or lightheadedness.   Please call our office if you notice your heart rates are dipping down into the 50s on your home monitor.   Call our office with any new cardiac concerns  Continue current medications  Continue heart-healthy diet. A diet low in sodium, low in cholesterol, limiting red meats and eating whole foods.   Follow up with Dr. Good in 6 months.

## 2025-10-06 ENCOUNTER — APPOINTMENT (OUTPATIENT)
Dept: PRIMARY CARE | Facility: CLINIC | Age: 80
End: 2025-10-06
Payer: MEDICARE

## 2025-10-17 ENCOUNTER — APPOINTMENT (OUTPATIENT)
Dept: UROLOGY | Facility: CLINIC | Age: 80
End: 2025-10-17
Payer: MEDICARE

## (undated) DEVICE — LUBRICANT, WATER SOLUBLE, BACTERIOSTATIC, 2 OZ, STERILE

## (undated) DEVICE — CATHETER, URETERAL, CONE TIP, 5 FR, WOVEN, RT & LFT, 70 CM, STERILE

## (undated) DEVICE — Device

## (undated) DEVICE — SWABSTICK, PREP, IODOPHOR, PVP, 8 IN

## (undated) DEVICE — PAD, GROUNDING, ELECTROSURGICAL, W/9 FT CABLE, POLYHESIVE II, ADULT, LF

## (undated) DEVICE — GOWN, SURGICAL, UROLOGY, IMPERVIOUS, XLONG, XLARGE, DISPOSABLE

## (undated) DEVICE — SOLUTION, IRRIGATION, STERILE WATER, 1000 ML, POUR BOTTLE

## (undated) DEVICE — CABLE, HF MONOPOLAR, 4M SINGLE USE

## (undated) DEVICE — GLOVE, PROTEXIS PI CLASSIC, SZ-7.0, PF, LF

## (undated) DEVICE — IRRIGATION SET, CYSTOSCOPY, REGULATING CLAMP, STRAIGHT, 81 IN

## (undated) DEVICE — TOWEL PACK, STERILE, 4/PACK, BLUE

## (undated) DEVICE — DRESSING, GAUZE, SPONGE, VERSALON, ALL PURPOSE, 4 X 4 IN, SOFT

## (undated) DEVICE — SOLUTION, IRRIGATION, USP, STERILE WATER, UROLOGICAL, 3000 ML, BAG

## (undated) DEVICE — BRUSH, CLEANING, INSTRUMENT, NYLON BRISTLES

## (undated) DEVICE — BAG, PRESSURE INFUSER, 3000 CC, LF

## (undated) DEVICE — IRRIGATION KIT, TURP, Y, LARGE BORE, 81 IN

## (undated) DEVICE — SOLUTION, IRRIGATION, USP, STERILE WATER, 1000 ML, BAG

## (undated) DEVICE — SOLUTION, IRRIGATION, USP, SODIUM CHLORIDE 0.9%, 3000 ML

## (undated) DEVICE — ELECTRODE, ELECTROSURGICAL, LOOP, CUTTING, 24 FR

## (undated) DEVICE — SYRINGE, 50 CC, IRRIGATION, CATHETER TIP, DISPOSABLE, STERILE, LF

## (undated) DEVICE — BAG, DRAINAGE, ANTI-REFLUX CHAMBER, 2000ML

## (undated) DEVICE — SYRINGE, 10 CC, LUER LOCK